# Patient Record
Sex: MALE | Race: WHITE | Employment: PART TIME | URBAN - METROPOLITAN AREA
[De-identification: names, ages, dates, MRNs, and addresses within clinical notes are randomized per-mention and may not be internally consistent; named-entity substitution may affect disease eponyms.]

---

## 2017-02-08 RX ORDER — QUINAPRIL 40 MG/1
TABLET ORAL
Qty: 30 TABLET | Refills: 5 | Status: SHIPPED | OUTPATIENT
Start: 2017-02-08 | End: 2017-08-08

## 2017-02-08 RX ORDER — HYDROCHLOROTHIAZIDE 25 MG/1
TABLET ORAL
Qty: 30 TABLET | Refills: 5 | Status: SHIPPED | OUTPATIENT
Start: 2017-02-08 | End: 2017-10-13

## 2017-02-08 RX ORDER — METOPROLOL TARTRATE 100 MG/1
TABLET ORAL
Qty: 60 TABLET | Refills: 5 | Status: SHIPPED | OUTPATIENT
Start: 2017-02-08 | End: 2017-08-28

## 2017-03-04 ENCOUNTER — APPOINTMENT (OUTPATIENT)
Dept: CT IMAGING | Facility: HOSPITAL | Age: 67
End: 2017-03-04
Attending: EMERGENCY MEDICINE
Payer: COMMERCIAL

## 2017-03-04 ENCOUNTER — TELEPHONE (OUTPATIENT)
Dept: INTERNAL MEDICINE CLINIC | Facility: CLINIC | Age: 67
End: 2017-03-04

## 2017-03-04 ENCOUNTER — HOSPITAL ENCOUNTER (EMERGENCY)
Facility: HOSPITAL | Age: 67
Discharge: HOME | End: 2017-03-04
Attending: EMERGENCY MEDICINE
Payer: COMMERCIAL

## 2017-03-04 VITALS
HEIGHT: 70 IN | TEMPERATURE: 97 F | HEART RATE: 66 BPM | RESPIRATION RATE: 18 BRPM | DIASTOLIC BLOOD PRESSURE: 79 MMHG | WEIGHT: 200 LBS | BODY MASS INDEX: 28.63 KG/M2 | OXYGEN SATURATION: 97 % | SYSTOLIC BLOOD PRESSURE: 129 MMHG

## 2017-03-04 DIAGNOSIS — S09.90XA HEAD INJURY, INITIAL ENCOUNTER: Primary | ICD-10-CM

## 2017-03-04 PROCEDURE — 99284 EMERGENCY DEPT VISIT MOD MDM: CPT

## 2017-03-04 PROCEDURE — 93010 ELECTROCARDIOGRAM REPORT: CPT | Performed by: EMERGENCY MEDICINE

## 2017-03-04 PROCEDURE — 70450 CT HEAD/BRAIN W/O DYE: CPT

## 2017-03-04 PROCEDURE — 93005 ELECTROCARDIOGRAM TRACING: CPT

## 2017-03-04 NOTE — TELEPHONE ENCOUNTER
Reason for Call/Chief Complaint: fall in apartment 2/25/17 tripped over bag hit head on wall now has H/A   Onset: 1 week ago  Nursing Assessment/Associated Symptoms: pt tripped over bag on floor, lost balance and hit head on wall while falling.   Denies LOC

## 2017-03-04 NOTE — TELEPHONE ENCOUNTER
Pt stts that he fell last Saturday 02/25 and hit his head. He is calling to schedule an appt with Dr. Marielle Ng due to having a headache.  Call transferred to RN

## 2017-03-05 NOTE — ED INITIAL ASSESSMENT (HPI)
Mechanical trip and fall one week ago; -LOC at time of injury. C/o increased frontal headache worse today. Hx of concussion 6 months ago.

## 2017-03-05 NOTE — ED NOTES
Pt states he fell last Saturday. Pt reports tripping over a gymbag and hitting his right forehead and the back of his head after trying to stop his fall by grabbing a chair. Denies LOC. Takes daily aspirin but has been off aspirin this past week.  Pt presxiao

## 2017-03-05 NOTE — ED PROVIDER NOTES
Patient Seen in: [facility] AND [facility] Emergency Department    History   Patient presents with:  Headache (neurologic)  Head Neck Injury (neurologic, musculoskeletal): pt hit head last saturday    Stated Complaint:     HPI    73-year-old male presents for co TABLET BY MOUTH TWO TIMES A DAY   QUINAPRIL HCL 40 MG Oral Tab,  TAKE ONE TABLET BY MOUTH EVERY DAY   HYDROCHLOROTHIAZIDE 25 MG Oral Tab,  TAKE ONE TABLET BY MOUTH EVERY DAY   AmLODIPine Besylate 10 MG Oral Tab,  Take 1 tablet (10 mg total) by mouth once d (36.2 °C) (Oral)  Resp 18  Ht 177.8 cm (5' 10\")  Wt 90.719 kg  BMI 28.70 kg/m2  SpO2 97%        Physical Exam   Constitutional: He is oriented to person, place, and time. He appears well-developed and well-nourished.    HENT:   Head: Normocephalic and atra foot: Normal.        Left foot: Normal.   Neurological: He is alert and oriented to person, place, and time. He has normal strength. No cranial nerve deficit or sensory deficit. Coordination and gait normal. GCS eye subscore is 4. GCS verbal subscore is 5. sinus. ORBITS: Limited views are unremarkable. OTHER: Negative. Dictated by (CST): Danna Garrido MD on 3/04/2017 at 19:50     Approved by (CST): Danna Arteaga MD on 3/04/2017 at 19:53          3/4/2017  CONCLUSION:  1.  No evidence o

## 2017-03-06 NOTE — TELEPHONE ENCOUNTER
Pt seen in ED 3/4/17, Dx with mild concussion. Treatment plan discussed with ED discharge physician.   Pt unable to schedule F/U appt at this time as leaving for out of town 3/7/17

## 2017-04-17 ENCOUNTER — OFFICE VISIT (OUTPATIENT)
Dept: INTERNAL MEDICINE CLINIC | Facility: CLINIC | Age: 67
End: 2017-04-17

## 2017-04-17 VITALS
HEIGHT: 68.5 IN | HEART RATE: 54 BPM | RESPIRATION RATE: 20 BRPM | SYSTOLIC BLOOD PRESSURE: 124 MMHG | WEIGHT: 198 LBS | TEMPERATURE: 98 F | BODY MASS INDEX: 29.66 KG/M2 | DIASTOLIC BLOOD PRESSURE: 66 MMHG

## 2017-04-17 DIAGNOSIS — N52.9 ERECTILE DYSFUNCTION, UNSPECIFIED ERECTILE DYSFUNCTION TYPE: ICD-10-CM

## 2017-04-17 DIAGNOSIS — Z00.00 ENCOUNTER FOR ANNUAL HEALTH EXAMINATION: Primary | ICD-10-CM

## 2017-04-17 DIAGNOSIS — I10 ESSENTIAL HYPERTENSION WITH GOAL BLOOD PRESSURE LESS THAN 140/90: ICD-10-CM

## 2017-04-17 DIAGNOSIS — C61 PROSTATE CANCER (HCC): ICD-10-CM

## 2017-04-17 DIAGNOSIS — I25.10 ATHEROSCLEROSIS OF NATIVE CORONARY ARTERY OF NATIVE HEART WITHOUT ANGINA PECTORIS: ICD-10-CM

## 2017-04-17 DIAGNOSIS — Z23 NEED FOR VACCINATION: ICD-10-CM

## 2017-04-17 DIAGNOSIS — E78.5 HYPERLIPIDEMIA, UNSPECIFIED HYPERLIPIDEMIA TYPE: ICD-10-CM

## 2017-04-17 DIAGNOSIS — M10.9 GOUT, UNSPECIFIED CAUSE, UNSPECIFIED CHRONICITY, UNSPECIFIED SITE: ICD-10-CM

## 2017-04-17 DIAGNOSIS — R73.09 ELEVATED GLUCOSE: ICD-10-CM

## 2017-04-17 PROCEDURE — G0438 PPPS, INITIAL VISIT: HCPCS | Performed by: INTERNAL MEDICINE

## 2017-04-17 PROCEDURE — 90736 HZV VACCINE LIVE SUBQ: CPT | Performed by: INTERNAL MEDICINE

## 2017-04-17 PROCEDURE — 90471 IMMUNIZATION ADMIN: CPT | Performed by: INTERNAL MEDICINE

## 2017-04-17 PROCEDURE — 90670 PCV13 VACCINE IM: CPT | Performed by: INTERNAL MEDICINE

## 2017-04-17 RX ORDER — AMLODIPINE BESYLATE 10 MG/1
10 TABLET ORAL
Qty: 90 TABLET | Refills: 3 | Status: SHIPPED | OUTPATIENT
Start: 2017-04-17 | End: 2018-04-19

## 2017-04-17 NOTE — PROGRESS NOTES
HPI:   Lexis Garcia is a 74year old male who presents for a Medicare Initial Annual Wellness visit (Once after 12 month Medicare anniversery) . Patient here for general physical exam.  He has Medicare as secondary insurance.   This will be done 2015     Treated with hormonal injection Trelstar x 1 in May 2015.  Then external beam radiation in Sept 2015; and brachytherapy in Dec 2015          Past Surgical History    KNEE SURGERY  2008    Comment plate and screws    ANGIOPLASTY (CORONARY)  2004 MOUTH ONCE DAILY   Pantoprazole Sodium 40 MG Oral Tab EC Take 40 mg by mouth every morning before breakfast.     tamsulosin HCl 0.4 MG Oral Cap    aspirin 81 MG Oral Tab Take 81 mg by mouth daily.    COMBIGAN 0.2-0.5 % Ophthalmic Solution 1 drop by Each eye (Required for AWV/SWV)    Hearing Screening    Screening Method:  Questionnaire      I have a problem hearing over the telephone:  No I have trouble following   the conversations when two or more people are talking at the same time:    No   I have trouble trachea midline, no adenopathy, thyroid: not enlarged, symmetric, no tenderness/mass/nodules, no carotid bruit or JVD   Back:   Symmetric, no curvature, ROM normal, no CVA tenderness   Lungs:   Clear to auscultation bilaterally, respirations unlabored   Ch ASSAY,         THYROID STIM HORMONE, VITAMIN B12        Blood pressure currently well controlled.   Continue current medications.    (I25.10) Atherosclerosis of native coronary artery of native heart without angina pectoris  Plan: No current cardiac symptom maintain a good energy level?: Appropriate Exercise    How would you describe your daily physical activity?: Moderate    How would you describe your current health state?: Good    How do you maintain positive mental well-being?: Social Interaction    If yo in Charting, test patient and document. Then, refresh your progress note to see your input here.   Cognitive Assessment     What day of the week is this?: Correct    What month is it?: Correct    What year is it?: Correct    Recall \"Ball\": Correct    R SPECIFIC DISEASE MONITORING Internal Lab or Procedure External Lab or Procedure   Annual Monitoring of Persistent     Medications (ACE/ARB, digoxin diuretics, anticonvulsants.)    Potassium  Annually POTASSIUM (P) (mmol/L)   Date Value   03/03/2016 4. 1

## 2017-05-10 ENCOUNTER — LAB ENCOUNTER (OUTPATIENT)
Dept: LAB | Age: 67
End: 2017-05-10
Attending: INTERNAL MEDICINE
Payer: COMMERCIAL

## 2017-05-10 DIAGNOSIS — Z00.00 ENCOUNTER FOR ANNUAL HEALTH EXAMINATION: ICD-10-CM

## 2017-05-10 DIAGNOSIS — I10 ESSENTIAL HYPERTENSION WITH GOAL BLOOD PRESSURE LESS THAN 140/90: ICD-10-CM

## 2017-05-10 DIAGNOSIS — C61 PROSTATE CANCER (HCC): ICD-10-CM

## 2017-05-10 DIAGNOSIS — R73.09 ELEVATED GLUCOSE: ICD-10-CM

## 2017-05-10 DIAGNOSIS — M10.9 GOUT, UNSPECIFIED CAUSE, UNSPECIFIED CHRONICITY, UNSPECIFIED SITE: ICD-10-CM

## 2017-05-10 PROCEDURE — 80053 COMPREHEN METABOLIC PANEL: CPT

## 2017-05-10 PROCEDURE — 82607 VITAMIN B-12: CPT

## 2017-05-10 PROCEDURE — 84153 ASSAY OF PSA TOTAL: CPT

## 2017-05-10 PROCEDURE — 85025 COMPLETE CBC W/AUTO DIFF WBC: CPT

## 2017-05-10 PROCEDURE — 84550 ASSAY OF BLOOD/URIC ACID: CPT

## 2017-05-10 PROCEDURE — 84443 ASSAY THYROID STIM HORMONE: CPT

## 2017-05-10 PROCEDURE — 80061 LIPID PANEL: CPT

## 2017-05-10 PROCEDURE — 36415 COLL VENOUS BLD VENIPUNCTURE: CPT

## 2017-05-10 PROCEDURE — 83036 HEMOGLOBIN GLYCOSYLATED A1C: CPT

## 2017-07-14 ENCOUNTER — APPOINTMENT (OUTPATIENT)
Dept: OTHER | Facility: HOSPITAL | Age: 67
End: 2017-07-14
Attending: ORTHOPAEDIC SURGERY

## 2017-08-11 RX ORDER — QUINAPRIL 40 MG/1
TABLET ORAL
Qty: 30 TABLET | Refills: 2 | Status: SHIPPED | OUTPATIENT
Start: 2017-08-11 | End: 2017-11-02

## 2017-08-11 NOTE — TELEPHONE ENCOUNTER
Hypertensive Medications  Protocol Criteria:  · Appointment scheduled in the past 6 months or in the next 3 months  · BMP or CMP in the past 12 months  · Creatinine result < 2  Recent Outpatient Visits            3 months ago Encounter for annual health ex

## 2017-08-30 RX ORDER — METOPROLOL TARTRATE 100 MG/1
TABLET ORAL
Qty: 60 TABLET | Refills: 2 | Status: SHIPPED | OUTPATIENT
Start: 2017-08-30 | End: 2017-12-16

## 2017-08-30 NOTE — TELEPHONE ENCOUNTER
Hypertensive Medications:Refilled per protocol    Protocol Criteria:  · Appointment scheduled in the past 6 months or in the next 3 months  · BMP or CMP in the past 12 months  · Creatinine result < 2  Recent Outpatient Visits            4 months ago [employer]

## 2017-10-02 ENCOUNTER — TELEPHONE (OUTPATIENT)
Dept: INTERNAL MEDICINE CLINIC | Facility: CLINIC | Age: 67
End: 2017-10-02

## 2017-10-02 RX ORDER — ATORVASTATIN CALCIUM 40 MG/1
TABLET, FILM COATED ORAL
Qty: 30 TABLET | Refills: 0 | Status: SHIPPED | OUTPATIENT
Start: 2017-10-02 | End: 2017-11-02

## 2017-10-02 NOTE — TELEPHONE ENCOUNTER
Per pt, he needs refill on his ATORVASTATIN CALCIUM .       Current Outpatient Prescriptions:                          ATORVASTATIN CALCIUM 40 MG Oral Tab TAKE ONE TABLET BY MOUTH ONCE DAILY Disp: 30 tablet Rfl: 4

## 2017-10-04 RX ORDER — ATORVASTATIN CALCIUM 40 MG/1
TABLET, FILM COATED ORAL
Qty: 30 TABLET | Refills: 2 | OUTPATIENT
Start: 2017-10-04

## 2017-10-13 ENCOUNTER — PATIENT OUTREACH (OUTPATIENT)
Dept: INTERNAL MEDICINE CLINIC | Facility: CLINIC | Age: 67
End: 2017-10-13

## 2017-10-16 RX ORDER — HYDROCHLOROTHIAZIDE 25 MG/1
TABLET ORAL
Qty: 90 TABLET | Refills: 0 | Status: SHIPPED | OUTPATIENT
Start: 2017-10-16 | End: 2018-03-03

## 2017-10-16 NOTE — TELEPHONE ENCOUNTER
Hypertensive Medications; REFILLED PER PROTOCOL.       Protocol Criteria:  · Appointment scheduled in the past 6 months or in the next 3 months  · BMP or CMP in the past 12 months  · Creatinine result < 2  Recent Outpatient Visits            6 months ago En

## 2017-10-17 ENCOUNTER — PATIENT MESSAGE (OUTPATIENT)
Dept: INTERNAL MEDICINE CLINIC | Facility: CLINIC | Age: 67
End: 2017-10-17

## 2017-10-18 NOTE — TELEPHONE ENCOUNTER
From: Lexis Garcia  To: Ishmael Gunn MD  Sent: 10/17/2017 2:30 PM CDT  Subject: Visit Follow-up Question    I had to cancel my October appointment due to my revised work schedule.  I have rescheduled for December 14th but if a time opens up on a T

## 2017-11-02 NOTE — TELEPHONE ENCOUNTER
Please advise on refill request.    Hypertensive Medications  Protocol Criteria:  · Appointment scheduled in the past 6 months or in the next 3 months  · BMP or CMP in the past 12 months  · Creatinine result < 2  Recent Outpatient Visits            6 month Daniel Obrien Sofia Epps, MD    Office Visit    1 year ago Concussion, without loss of consciousness, initial encounter    Daniel Obrien Sofia Epps, MD    Office Visit    1 year ago Concussion, withou

## 2017-11-03 RX ORDER — ATORVASTATIN CALCIUM 40 MG/1
TABLET, FILM COATED ORAL
Qty: 90 TABLET | Refills: 1 | Status: SHIPPED | OUTPATIENT
Start: 2017-11-03 | End: 2018-06-19

## 2017-11-03 RX ORDER — QUINAPRIL 40 MG/1
TABLET ORAL
Qty: 90 TABLET | Refills: 1 | Status: SHIPPED | OUTPATIENT
Start: 2017-11-03 | End: 2018-05-03

## 2017-12-14 ENCOUNTER — OFFICE VISIT (OUTPATIENT)
Dept: INTERNAL MEDICINE CLINIC | Facility: CLINIC | Age: 67
End: 2017-12-14

## 2017-12-14 VITALS
HEART RATE: 61 BPM | BODY MASS INDEX: 32.09 KG/M2 | HEIGHT: 68.5 IN | SYSTOLIC BLOOD PRESSURE: 120 MMHG | RESPIRATION RATE: 18 BRPM | WEIGHT: 214.19 LBS | DIASTOLIC BLOOD PRESSURE: 75 MMHG

## 2017-12-14 DIAGNOSIS — I25.10 ATHEROSCLEROSIS OF NATIVE CORONARY ARTERY OF NATIVE HEART WITHOUT ANGINA PECTORIS: ICD-10-CM

## 2017-12-14 DIAGNOSIS — Z85.46 HISTORY OF PROSTATE CANCER: ICD-10-CM

## 2017-12-14 DIAGNOSIS — N52.9 ERECTILE DYSFUNCTION, UNSPECIFIED ERECTILE DYSFUNCTION TYPE: ICD-10-CM

## 2017-12-14 DIAGNOSIS — I10 ESSENTIAL HYPERTENSION WITH GOAL BLOOD PRESSURE LESS THAN 140/90: Primary | ICD-10-CM

## 2017-12-14 DIAGNOSIS — E78.5 HYPERLIPIDEMIA, UNSPECIFIED HYPERLIPIDEMIA TYPE: ICD-10-CM

## 2017-12-14 DIAGNOSIS — M10.9 GOUT, UNSPECIFIED CAUSE, UNSPECIFIED CHRONICITY, UNSPECIFIED SITE: ICD-10-CM

## 2017-12-14 PROCEDURE — 99214 OFFICE O/P EST MOD 30 MIN: CPT | Performed by: INTERNAL MEDICINE

## 2017-12-14 PROCEDURE — G0463 HOSPITAL OUTPT CLINIC VISIT: HCPCS | Performed by: INTERNAL MEDICINE

## 2017-12-14 NOTE — PROGRESS NOTES
HPI:    Patient ID: Mikie Rod is a 69year old male.     HPI  Patient returns to the office today to discuss chronic medical issues which include Patient Active Problem List:     Coronary atherosclerosis     Gout     Essential hypertension     Hype knees      Past Surgical History:  2004: ANGIOPLASTY (CORONARY)      Comment: and stent of LCx  2011: CARD ECHO STRESS ECHO/REST AND STRESS(CPT=9335*      Comment: per NG: normal stress echo, (CAD)  No date: HEAD SURGERY PROC UNLISTED      Comment: per NG: Take 1 tablet (10 mg total) by mouth once daily. Disp: 90 tablet Rfl: 3   aspirin 81 MG Oral Tab Take 81 mg by mouth daily. Disp:  Rfl:    COMBIGAN 0.2-0.5 % Ophthalmic Solution 1 drop by Each eye route 2 (two) times daily.    Disp:  Rfl:      Allergies:No prostate cancer. Discussed various options. I would consider high-dose Viagra or Cialis. Patient will think about it.   He may either call us or talk to his urology doctors.    (Z85.46) History of prostate cancer  Plan: No evidence of recurrence at this

## 2017-12-16 RX ORDER — METOPROLOL TARTRATE 100 MG/1
TABLET ORAL
Qty: 180 TABLET | Refills: 0 | Status: SHIPPED | OUTPATIENT
Start: 2017-12-16 | End: 2018-03-30

## 2017-12-29 ENCOUNTER — HOSPITAL ENCOUNTER (OUTPATIENT)
Dept: GENERAL RADIOLOGY | Facility: HOSPITAL | Age: 67
Discharge: HOME | End: 2017-12-29
Attending: ORTHOPAEDIC SURGERY | Admitting: ORTHOPAEDIC SURGERY
Payer: MEDICARE

## 2017-12-29 ENCOUNTER — OFFICE VISIT (OUTPATIENT)
Dept: ORTHOPEDICS CLINIC | Facility: CLINIC | Age: 67
End: 2017-12-29

## 2017-12-29 ENCOUNTER — HOSPITAL ENCOUNTER (OUTPATIENT)
Dept: ULTRASOUND IMAGING | Facility: HOSPITAL | Age: 67
Discharge: HOME | End: 2017-12-29
Attending: ORTHOPAEDIC SURGERY
Payer: MEDICARE

## 2017-12-29 DIAGNOSIS — M79.661 RIGHT CALF PAIN: ICD-10-CM

## 2017-12-29 DIAGNOSIS — M17.11 PRIMARY OSTEOARTHRITIS OF RIGHT KNEE: Primary | ICD-10-CM

## 2017-12-29 DIAGNOSIS — M25.561 ACUTE PAIN OF RIGHT KNEE: ICD-10-CM

## 2017-12-29 PROCEDURE — 99203 OFFICE O/P NEW LOW 30 MIN: CPT | Performed by: ORTHOPAEDIC SURGERY

## 2017-12-29 PROCEDURE — G0463 HOSPITAL OUTPT CLINIC VISIT: HCPCS | Performed by: ORTHOPAEDIC SURGERY

## 2017-12-29 PROCEDURE — 73565 X-RAY EXAM OF KNEES: CPT | Performed by: ORTHOPAEDIC SURGERY

## 2017-12-29 PROCEDURE — 93971 EXTREMITY STUDY: CPT | Performed by: ORTHOPAEDIC SURGERY

## 2017-12-29 PROCEDURE — 73560 X-RAY EXAM OF KNEE 1 OR 2: CPT | Performed by: ORTHOPAEDIC SURGERY

## 2017-12-29 NOTE — PROGRESS NOTES
12/29/2017  Juan Diego Aguilar  9/21/1950  69year old   male  Jerson Knox MD    HPI:   Patient presents with:  Knee Pain: Right - onset 12/23/17 when he twiked his knee and he was unable to put any weight on it - states he is a little better - [facility] [place]) Oct 2014   • CAD (coronary artery disease)     x2; chemical stress test normal, done in [facility] Oct 2014   • Prostate cancer [facility]) May 2015    Treated with hormonal injection Trelstar x 1 in May 2015.  Then external beam radiation in Sept 2015; a from 5-100°. The patient has a soft calf and negative Homans sign. The patient has a varus malalignment of the right knee.     IMAGING AND TESTING:  Plain films of the right knee were obtained today reveal significant medial, lateral, patellofemoral lupe

## 2018-01-02 ENCOUNTER — OFFICE VISIT (OUTPATIENT)
Dept: ORTHOPEDICS CLINIC | Facility: CLINIC | Age: 68
End: 2018-01-02

## 2018-01-02 VITALS — RESPIRATION RATE: 16 BRPM | SYSTOLIC BLOOD PRESSURE: 148 MMHG | HEART RATE: 72 BPM | DIASTOLIC BLOOD PRESSURE: 78 MMHG

## 2018-01-02 DIAGNOSIS — M17.11 PRIMARY OSTEOARTHRITIS OF RIGHT KNEE: Primary | ICD-10-CM

## 2018-01-02 PROCEDURE — 99213 OFFICE O/P EST LOW 20 MIN: CPT | Performed by: ORTHOPAEDIC SURGERY

## 2018-01-02 PROCEDURE — G0463 HOSPITAL OUTPT CLINIC VISIT: HCPCS | Performed by: ORTHOPAEDIC SURGERY

## 2018-01-03 NOTE — PROGRESS NOTES
1/2/2018  Nicolettezaida Penndenis  9/21/1950  69year old   male  Rashmi Ramirez MD    HPI:   Patient presents with:  Knee Pain: Right- pt states there has been some improvement since LOV on 12/29, but pt would like a cortisone injection    This is a pleas increases, he would be a candidate for cortisone injection. All of their questions were answered and they are in agreement with the treatment plan.

## 2018-01-10 ENCOUNTER — OFFICE VISIT (OUTPATIENT)
Dept: PHYSICAL THERAPY | Facility: HOSPITAL | Age: 68
End: 2018-01-10
Attending: ORTHOPAEDIC SURGERY
Payer: MEDICARE

## 2018-01-10 PROCEDURE — 97110 THERAPEUTIC EXERCISES: CPT

## 2018-01-10 PROCEDURE — 97162 PT EVAL MOD COMPLEX 30 MIN: CPT

## 2018-01-10 NOTE — PROGRESS NOTES
LOWER EXTREMITY EVALUATION:   Referring Physician: Dr. Tavon Rivers  Diagnosis: Primary osteoarthritis of right knee (M17.11)      Date of Onset: 12/20/17 Date of Service: 1/10/2018     PATIENT SUMMARY   The patient is a 70 y/o male who presented with chronic anatomical position  Gait: antalgic pattern with R lateral lean; R hip ER with mild valgus   Palpation: TTP R lateral joint line   Sensation: WNL    AROM: Knee:  R 0-115 deg; L 0- 117 deg     Accessory motion: moderate loss (2/6) sup/inf R patellar mobilit Walking and Moving Around CJ: 20-39% impaired, limited, or restricted    The patient was advised of these findings, precautions, and treatment options and has agreed to actively participate in planning and for this course of care.     Thank you for your ref

## 2018-01-17 ENCOUNTER — OFFICE VISIT (OUTPATIENT)
Dept: PHYSICAL THERAPY | Facility: HOSPITAL | Age: 68
End: 2018-01-17
Attending: ORTHOPAEDIC SURGERY
Payer: MEDICARE

## 2018-01-17 PROCEDURE — 97110 THERAPEUTIC EXERCISES: CPT

## 2018-01-17 NOTE — PROGRESS NOTES
Diagnosis: Primary osteoarthritis of right knee (M17.11)   Authorized # of Visits:  2/10 (Medicare)        Next MD visit: none scheduled  Fall Risk: standard         Precautions: n/a           Medication Changes since last visit?: No  Subjective: PAS 2/10. min

## 2018-01-19 ENCOUNTER — OFFICE VISIT (OUTPATIENT)
Dept: PHYSICAL THERAPY | Facility: HOSPITAL | Age: 68
End: 2018-01-19
Attending: ORTHOPAEDIC SURGERY
Payer: MEDICARE

## 2018-01-19 PROCEDURE — 97110 THERAPEUTIC EXERCISES: CPT

## 2018-01-19 NOTE — PROGRESS NOTES
Diagnosis: Primary osteoarthritis of right knee (M17.11)   Authorized # of Visits:  3/10 (Medicare)        Next MD visit: none scheduled  Fall Risk: standard         Precautions: n/a           Medication Changes since last visit?: No  Subjective:    PAS 0- body mechanics      Skilled Services: PREs, HEP progression     Charges: there ex 3       Total Timed Treatment: 40 min  Total Treatment Time:  42 min

## 2018-01-24 ENCOUNTER — OFFICE VISIT (OUTPATIENT)
Dept: PHYSICAL THERAPY | Facility: HOSPITAL | Age: 68
End: 2018-01-24
Attending: ORTHOPAEDIC SURGERY
Payer: MEDICARE

## 2018-01-24 PROCEDURE — 97110 THERAPEUTIC EXERCISES: CPT

## 2018-01-24 NOTE — PROGRESS NOTES
Diagnosis: Primary osteoarthritis of right knee (M17.11)   Authorized # of Visits:  4/10 (Medicare)        Next MD visit: none scheduled  Fall Risk: standard         Precautions: n/a           Medication Changes since last visit?: No  Subjective:    Overal Charges: there ex 3       Total Timed Treatment: 40 min  Total Treatment Time:  42 min

## 2018-01-26 ENCOUNTER — OFFICE VISIT (OUTPATIENT)
Dept: PHYSICAL THERAPY | Facility: HOSPITAL | Age: 68
End: 2018-01-26
Attending: ORTHOPAEDIC SURGERY
Payer: MEDICARE

## 2018-01-26 PROCEDURE — 97110 THERAPEUTIC EXERCISES: CPT

## 2018-01-26 NOTE — PROGRESS NOTES
Diagnosis: Primary osteoarthritis of right knee (M17.11)   Authorized # of Visits:  5/10 (Medicare)        Next MD visit: none scheduled  Fall Risk: standard         Precautions: n/a           Medication Changes since last visit?: No  Subjective:    Overal Skilled Services: PREs, HEP progression     Charges: there ex 3       Total Timed Treatment: 40 min  Total Treatment Time:  42 min

## 2018-01-31 ENCOUNTER — TELEPHONE (OUTPATIENT)
Dept: INTERNAL MEDICINE CLINIC | Facility: CLINIC | Age: 68
End: 2018-01-31

## 2018-01-31 ENCOUNTER — OFFICE VISIT (OUTPATIENT)
Dept: PHYSICAL THERAPY | Facility: HOSPITAL | Age: 68
End: 2018-01-31
Attending: ORTHOPAEDIC SURGERY
Payer: MEDICARE

## 2018-01-31 PROCEDURE — 97110 THERAPEUTIC EXERCISES: CPT

## 2018-01-31 NOTE — TELEPHONE ENCOUNTER
Please contact the patient. There are a couple of options. We can try Flomax/tamsulosin. The chart shows that he was on this at some point in the past.  Was it effective? Was it well tolerated? The other option would be Avodart/Dutasteride.   Please le

## 2018-01-31 NOTE — TELEPHONE ENCOUNTER
Pt states at his last OV with Dr Dipika Abreu on 12/14/17, they discussed his poor sleep due to frequent nighttime urination and questioning if it was the need to urinate that was waking him up or if he was simply waking up and then urinating.  Pt states current

## 2018-01-31 NOTE — PROGRESS NOTES
Diagnosis: Primary osteoarthritis of right knee (M17.11)   Authorized # of Visits:  6/10 (Medicare)        Next MD visit: none scheduled  Fall Risk: standard         Precautions: n/a           Medication Changes since last visit?: No  Subjective:    Overal limitation      Plan: reduce R knee pain; improve R knee ROM/mobility, quad/hamstring strength, single leg stability, gait and body mechanics      Skilled Services: PREs, HEP progression     Charges: there ex 3       Total Timed Treatment: 40 min  Total Tr

## 2018-02-01 RX ORDER — DUTASTERIDE 0.5 MG/1
0.5 CAPSULE, LIQUID FILLED ORAL DAILY
Qty: 30 CAPSULE | Refills: 5 | Status: SHIPPED | OUTPATIENT
Start: 2018-02-01 | End: 2019-06-04

## 2018-02-01 NOTE — TELEPHONE ENCOUNTER
Pt stts was on Flomax in the past but didn't notice a difference after a couple of weeks. . Would like to try Avodart please if it doesn't interfere with all his other meds.

## 2018-02-01 NOTE — TELEPHONE ENCOUNTER
Please call patient. Prescription for Avodart sent to the pharmacy. He can start that and see if it helps over the coming weeks. It should not interact with his other medications.

## 2018-02-02 ENCOUNTER — TELEPHONE (OUTPATIENT)
Dept: ORTHOPEDICS CLINIC | Facility: CLINIC | Age: 68
End: 2018-02-02

## 2018-02-02 ENCOUNTER — OFFICE VISIT (OUTPATIENT)
Dept: PHYSICAL THERAPY | Facility: HOSPITAL | Age: 68
End: 2018-02-02
Attending: ORTHOPAEDIC SURGERY
Payer: MEDICARE

## 2018-02-02 DIAGNOSIS — M17.11 PRIMARY OSTEOARTHRITIS OF RIGHT KNEE: Primary | ICD-10-CM

## 2018-02-02 PROCEDURE — 97110 THERAPEUTIC EXERCISES: CPT

## 2018-02-02 NOTE — PROGRESS NOTES
Diagnosis: Primary osteoarthritis of right knee (M17.11)   Authorized # of Visits:  7/10 (Medicare)        Next MD visit: none scheduled  Fall Risk: standard         Precautions: n/a           Medication Changes since last visit?: No  Subjective:    Pt rep demonstrate improvement in gait mechanics (i.e decrease lateral lean) necessary to walk hi dogs > 20 minutes without limitation      Plan: reduce R knee pain; improve R knee ROM/mobility, quad/hamstring strength, single leg stability, gait and body mechani

## 2018-02-02 NOTE — TELEPHONE ENCOUNTER
pt called, he is requesting  4 more PT visits. Please advise. Next appt is 2/14/18 with PT. OK to leave message on VM.

## 2018-02-04 NOTE — TELEPHONE ENCOUNTER
Left message on personal VM stating rx as requested on 2/1/18 has been switched and approved by [facility] to local pharm. To take as prescribed, see if it helps over the next few weeks and no interaction with other rx's. To call back if questions/concerns.

## 2018-02-05 NOTE — TELEPHONE ENCOUNTER
Pt called back and stated that he has started the medication but is concern as the pharmacist inform him it might lower his b/p. Pt was inform that [facility] is aware of the medication he is taking.  Pt was advised to monitor his b/p if he feels its gettin

## 2018-02-07 ENCOUNTER — OFFICE VISIT (OUTPATIENT)
Dept: PHYSICAL THERAPY | Facility: HOSPITAL | Age: 68
End: 2018-02-07
Attending: ORTHOPAEDIC SURGERY
Payer: MEDICARE

## 2018-02-07 PROCEDURE — 97110 THERAPEUTIC EXERCISES: CPT

## 2018-02-07 NOTE — PROGRESS NOTES
Diagnosis: Primary osteoarthritis of right knee (M17.11)   Authorized # of Visits:  8/10 (Medicare)        Next MD visit: none scheduled  Fall Risk: standard         Precautions: n/a           Medication Changes since last visit?: No  Subjective:    Pt rep patient will demonstrate improvement in gait mechanics (i.e decrease lateral lean) necessary to walk hi dogs > 20 minutes without limitation      Plan: reduce R knee pain; improve R knee ROM/mobility, quad/hamstring strength, single leg stability, gait and

## 2018-02-21 ENCOUNTER — APPOINTMENT (OUTPATIENT)
Dept: PHYSICAL THERAPY | Facility: HOSPITAL | Age: 68
End: 2018-02-21
Attending: ORTHOPAEDIC SURGERY
Payer: MEDICARE

## 2018-02-28 ENCOUNTER — OFFICE VISIT (OUTPATIENT)
Dept: PHYSICAL THERAPY | Facility: HOSPITAL | Age: 68
End: 2018-02-28
Attending: ORTHOPAEDIC SURGERY
Payer: MEDICARE

## 2018-02-28 PROCEDURE — 97110 THERAPEUTIC EXERCISES: CPT

## 2018-02-28 NOTE — PROGRESS NOTES
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

## 2018-03-02 ENCOUNTER — OFFICE VISIT (OUTPATIENT)
Dept: PHYSICAL THERAPY | Facility: HOSPITAL | Age: 68
End: 2018-03-02
Attending: ORTHOPAEDIC SURGERY
Payer: MEDICARE

## 2018-03-02 PROCEDURE — 97110 THERAPEUTIC EXERCISES: CPT

## 2018-03-02 NOTE — PROGRESS NOTES
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

## 2018-03-05 RX ORDER — HYDROCHLOROTHIAZIDE 25 MG/1
TABLET ORAL
Qty: 90 TABLET | Refills: 1 | Status: SHIPPED | OUTPATIENT
Start: 2018-03-05 | End: 2018-10-01

## 2018-03-07 ENCOUNTER — OFFICE VISIT (OUTPATIENT)
Dept: PHYSICAL THERAPY | Facility: HOSPITAL | Age: 68
End: 2018-03-07
Attending: ORTHOPAEDIC SURGERY
Payer: MEDICARE

## 2018-03-07 PROCEDURE — 97110 THERAPEUTIC EXERCISES: CPT

## 2018-03-07 NOTE — PROGRESS NOTES
Diagnosis: Primary osteoarthritis of right knee (M17.11)   Authorized # of Visits: 11 (Medicare)        Next MD visit: none scheduled  Fall Risk: standard         Precautions: n/a           Medication Changes since last visit?: No  Subjective:    Pt report will demonstrate 2/3 to 1 grade increase in R knee extension strength necessary to descend stairs without UE support with normal pattern PAS <2/10    3) The patient will demonstrate 5-8 deg increase in bilateral flexion ROM necessary to kneel at work while

## 2018-03-14 ENCOUNTER — OFFICE VISIT (OUTPATIENT)
Dept: PHYSICAL THERAPY | Facility: HOSPITAL | Age: 68
End: 2018-03-14
Attending: ORTHOPAEDIC SURGERY
Payer: MEDICARE

## 2018-03-14 PROCEDURE — 97110 THERAPEUTIC EXERCISES: CPT

## 2018-03-14 NOTE — PROGRESS NOTES
Diagnosis: Primary osteoarthritis of right knee (M17.11)   Authorized # of Visits: 12 (Medicare)        Next MD visit: none scheduled  Fall Risk: standard         Precautions: n/a           Medication Changes since last visit?: No  Subjective:    Pt report Supine R knee ext with therapist OP 10 x 5 cts     Prone R quad stretch with belt 5 x 20 cts (HEP)    Gastroc stretch off edge of stair 4 x 20 cts  Lunge 2nd stair 10 x 3 cts   Side step GTB 4 x 30 ft  Standing hip abd/flex GTB 10x  [employer]omat balance beam

## 2018-03-31 RX ORDER — METOPROLOL TARTRATE 100 MG/1
TABLET ORAL
Qty: 180 TABLET | Refills: 0 | Status: SHIPPED | OUTPATIENT
Start: 2018-03-31 | End: 2018-06-30

## 2018-03-31 NOTE — TELEPHONE ENCOUNTER
Hypertensive Medications  Protocol Criteria:  · Appointment scheduled in the past 6 months or in the next 3 months  · BMP or CMP in the past 12 months  · Creatinine result < 2  Recent Outpatient Visits            2 weeks ago     [address]

## 2018-04-04 ENCOUNTER — TELEPHONE (OUTPATIENT)
Dept: ORTHOPEDICS CLINIC | Facility: CLINIC | Age: 68
End: 2018-04-04

## 2018-04-04 NOTE — TELEPHONE ENCOUNTER
Called back Mr Sarthak Jenkins.  Notiifed of needing to see rheumatologist  Notified of the appointment for April 13    Verbalizes understanding

## 2018-04-04 NOTE — TELEPHONE ENCOUNTER
Pain in the right knee. Knee does not support weight , He needs a cane. . Very sore if sits for a long time. Will lay down and ice. Gout in knees in the past.   Had a steroid in the past. Oral steroid. Suggested he make an appointment.  Wants to get in so

## 2018-04-04 NOTE — TELEPHONE ENCOUNTER
Pt states he was told by [facility] he has exasperated knee, pt states there was improvement after PT, but states his problem came back, asking to get steroid prescribed. Pls advise thank you.

## 2018-04-21 RX ORDER — AMLODIPINE BESYLATE 10 MG/1
TABLET ORAL
Qty: 30 TABLET | Refills: 2 | Status: SHIPPED | OUTPATIENT
Start: 2018-04-21 | End: 2018-07-13

## 2018-04-21 NOTE — TELEPHONE ENCOUNTER
rx refilled as per written protocol.     Hypertensive Medications  Protocol Criteria:  · Appointment scheduled in the past 6 months or in the next 3 months  · BMP or CMP in the past 12 months  · Creatinine result < 2  Recent Outpatient Visits            1 m

## 2018-04-26 ENCOUNTER — OFFICE VISIT (OUTPATIENT)
Dept: INTERNAL MEDICINE CLINIC | Facility: CLINIC | Age: 68
End: 2018-04-26

## 2018-04-26 VITALS
HEIGHT: 68 IN | RESPIRATION RATE: 20 BRPM | WEIGHT: 213.63 LBS | DIASTOLIC BLOOD PRESSURE: 74 MMHG | BODY MASS INDEX: 32.38 KG/M2 | SYSTOLIC BLOOD PRESSURE: 132 MMHG | TEMPERATURE: 99 F | HEART RATE: 74 BPM

## 2018-04-26 DIAGNOSIS — Z23 NEED FOR VACCINATION: ICD-10-CM

## 2018-04-26 DIAGNOSIS — M10.9 GOUT, UNSPECIFIED CAUSE, UNSPECIFIED CHRONICITY, UNSPECIFIED SITE: ICD-10-CM

## 2018-04-26 DIAGNOSIS — Z00.00 ENCOUNTER FOR ANNUAL HEALTH EXAMINATION: Primary | ICD-10-CM

## 2018-04-26 DIAGNOSIS — I25.10 ATHEROSCLEROSIS OF NATIVE CORONARY ARTERY OF NATIVE HEART WITHOUT ANGINA PECTORIS: ICD-10-CM

## 2018-04-26 DIAGNOSIS — I10 ESSENTIAL HYPERTENSION WITH GOAL BLOOD PRESSURE LESS THAN 140/90: ICD-10-CM

## 2018-04-26 DIAGNOSIS — Z85.46 HISTORY OF PROSTATE CANCER: ICD-10-CM

## 2018-04-26 DIAGNOSIS — E78.5 HYPERLIPIDEMIA, UNSPECIFIED HYPERLIPIDEMIA TYPE: ICD-10-CM

## 2018-04-26 DIAGNOSIS — R73.09 ELEVATED GLUCOSE: ICD-10-CM

## 2018-04-26 PROCEDURE — 90732 PPSV23 VACC 2 YRS+ SUBQ/IM: CPT | Performed by: INTERNAL MEDICINE

## 2018-04-26 PROCEDURE — G0009 ADMIN PNEUMOCOCCAL VACCINE: HCPCS | Performed by: INTERNAL MEDICINE

## 2018-04-26 PROCEDURE — G0439 PPPS, SUBSEQ VISIT: HCPCS | Performed by: INTERNAL MEDICINE

## 2018-04-26 RX ORDER — HYDROCHLOROTHIAZIDE 25 MG/1
25 TABLET ORAL
COMMUNITY
End: 2018-04-26

## 2018-04-26 RX ORDER — VITAMIN B COMPLEX
TABLET ORAL
COMMUNITY
End: 2019-06-04

## 2018-04-26 RX ORDER — FAMOTIDINE 20 MG
1000 TABLET ORAL WEEKLY
Status: ON HOLD | COMMUNITY
End: 2021-01-01

## 2018-04-26 RX ORDER — ASPIRIN 81 MG/1
81 TABLET ORAL
COMMUNITY
End: 2018-04-26

## 2018-04-26 RX ORDER — ATORVASTATIN CALCIUM 40 MG/1
40 TABLET, FILM COATED ORAL
COMMUNITY
End: 2018-04-26

## 2018-04-26 RX ORDER — AMLODIPINE BESYLATE 10 MG/1
10 TABLET ORAL
COMMUNITY
End: 2018-04-26

## 2018-04-26 RX ORDER — METOPROLOL TARTRATE 100 MG/1
100 TABLET ORAL
COMMUNITY
End: 2018-04-26

## 2018-04-26 RX ORDER — QUINAPRIL 40 MG/1
40 TABLET ORAL
COMMUNITY
End: 2018-04-26

## 2018-04-26 NOTE — PROGRESS NOTES
HPI:   Ac Cohen is a 69year old male who presents for a Medicare Subsequent Annual Wellness visit (Pt already had Initial Annual Wellness). Patient is here for annual wellness follow-up on chronic medical issues as listed below.   Last seen patient and Family/surrogate (if present), and forms available to patient in AVS       He does NOT have a Power of  for [employer] on file in [address] [place] [address] Rd.    Advance care planning including the explanation and discussion of advance directives standard for Oral Tab Take by mouth.    AMLODIPINE BESYLATE 10 MG Oral Tab TAKE ONE TABLET BY MOUTH ONE TIME DAILY    METOPROLOL TARTRATE 100 MG Oral Tab TAKE 1 TABLET BY MOUTH TWO TIMES A DAY   HYDROCHLOROTHIAZIDE 25 MG Oral Tab TAKE ONE TABLET BY MOUTH ONE TIME DAILY No   I have trouble understanding things on the TV:  No I have to strain to understand conversations:  No   I have to worry about missing the telephone ring or doorbell:  No I have trouble hearing conversations in a noisy background such as a crowded room There is no tenderness. Hernia confirmed negative in the right inguinal area and confirmed negative in the left inguinal area. Genitourinary: Rectum normal, prostate normal, testes normal and penis normal. Circumcised.    Musculoskeletal: He exhibits no t Blood pressure is reasonably well controlled. Continue same medications.   Check fasting blood work in about a month or so.    (E78.5) Hyperlipidemia, unspecified hyperlipidemia type  Plan: Check lipid profile and adjust medication if needed.    (M10.9) Go (mg/dL)   Date Value   05/10/2017 114 (H)   ----------  GLUCOSE (P) (mg/dL)   Date Value   03/03/2016 143 (H)   ----------       Cardiovascular Disease Screening     LDL Annually LDL Cholesterol (mg/dL)   Date Value   05/10/2017 59   03/03/2016 72        E found This may be covered with your pharmacy  prescription benefits      SPECIFIC DISEASE MONITORING Internal Lab or Procedure External Lab or Procedure      Annual Monitoring of Persistent     Medications (ACE/ARB, digoxin diuretics, anticonvulsants.)

## 2018-04-26 NOTE — PATIENT INSTRUCTIONS
London Watson's SCREENING SCHEDULE   Tests on this list are recommended by your physician but may not be covered, or covered at this frequency, by your insurer. Please check with your insurance carrier before scheduling to verify coverage.     PREVEN criteria:   • Men who are 73-68 years old and have smoked more than 100 cigarettes in their lifetime   • Anyone with a family history    Colorectal Cancer Screening Covered up to Age 76     Colonoscopy Screen   Covered every 10 years- more often if abnorma Homosexual men   Illicit injectable drug abusers     Tetanus Toxoid- Only covered with a cut with metal- TD and TDaP Not covered by Medicare Part B) No orders found for this or any previous visit.  This may be covered with your prescription benefits, but

## 2018-05-03 DIAGNOSIS — I10 HYPERTENSION, UNSPECIFIED TYPE: Primary | ICD-10-CM

## 2018-05-03 RX ORDER — QUINAPRIL 40 MG/1
TABLET ORAL
Qty: 90 TABLET | Refills: 0 | Status: SHIPPED | OUTPATIENT
Start: 2018-05-03 | End: 2018-07-31

## 2018-05-03 NOTE — TELEPHONE ENCOUNTER
Hypertensive Medications  Protocol Criteria:  · Appointment scheduled in the past 6 months or in the next 3 months  · BMP or CMP in the past 12 months  · Creatinine result < 2  Recent Outpatient Visits            1 week ago Encounter for annual health exam

## 2018-05-03 NOTE — TELEPHONE ENCOUNTER
Pending Prescriptions Disp Refills    QUINAPRIL HCL 40 MG Oral Tab [Pharmacy Med Name: Quinapril HCl Oral Tablet 40 MG] 90 tablet 0     Sig: TAKE ONE TABLET BY MOUTH ONE TIME DAILY            Refill approved per protocol.

## 2018-06-05 ENCOUNTER — LAB ENCOUNTER (OUTPATIENT)
Dept: LAB | Facility: HOSPITAL | Age: 68
End: 2018-06-05
Attending: INTERNAL MEDICINE
Payer: MEDICARE

## 2018-06-05 DIAGNOSIS — I10 ESSENTIAL HYPERTENSION WITH GOAL BLOOD PRESSURE LESS THAN 140/90: ICD-10-CM

## 2018-06-05 DIAGNOSIS — M10.9 GOUT, UNSPECIFIED CAUSE, UNSPECIFIED CHRONICITY, UNSPECIFIED SITE: ICD-10-CM

## 2018-06-05 DIAGNOSIS — Z85.46 HISTORY OF PROSTATE CANCER: ICD-10-CM

## 2018-06-05 PROCEDURE — 84443 ASSAY THYROID STIM HORMONE: CPT

## 2018-06-05 PROCEDURE — 84153 ASSAY OF PSA TOTAL: CPT

## 2018-06-05 PROCEDURE — 84550 ASSAY OF BLOOD/URIC ACID: CPT

## 2018-06-05 PROCEDURE — 80053 COMPREHEN METABOLIC PANEL: CPT

## 2018-06-05 PROCEDURE — 36415 COLL VENOUS BLD VENIPUNCTURE: CPT

## 2018-06-05 PROCEDURE — 85025 COMPLETE CBC W/AUTO DIFF WBC: CPT

## 2018-06-05 PROCEDURE — 80061 LIPID PANEL: CPT

## 2018-06-19 RX ORDER — ATORVASTATIN CALCIUM 40 MG/1
TABLET, FILM COATED ORAL
Qty: 90 TABLET | Refills: 0 | Status: SHIPPED | OUTPATIENT
Start: 2018-06-19 | End: 2018-09-26

## 2018-06-19 NOTE — TELEPHONE ENCOUNTER
Cholesterol Medications  Protocol Criteria:  · Appointment scheduled in the past 12 months or in the next 3 months  · ALT & LDL on file in the past 12 months  · ALT result < 80  · LDL result <130   Recent Outpatient Visits            1 month ago Encounter

## 2018-06-30 RX ORDER — METOPROLOL TARTRATE 100 MG/1
TABLET ORAL
Qty: 180 TABLET | Refills: 0 | Status: SHIPPED | OUTPATIENT
Start: 2018-06-30 | End: 2018-10-01

## 2018-07-13 RX ORDER — AMLODIPINE BESYLATE 10 MG/1
TABLET ORAL
Qty: 90 TABLET | Refills: 0 | Status: SHIPPED | OUTPATIENT
Start: 2018-07-13 | End: 2018-10-01

## 2018-07-31 DIAGNOSIS — I10 HYPERTENSION, UNSPECIFIED TYPE: ICD-10-CM

## 2018-08-01 RX ORDER — QUINAPRIL 40 MG/1
TABLET ORAL
Qty: 90 TABLET | Refills: 0 | Status: SHIPPED | OUTPATIENT
Start: 2018-08-01 | End: 2018-10-18

## 2018-08-23 ENCOUNTER — PATIENT MESSAGE (OUTPATIENT)
Dept: INTERNAL MEDICINE CLINIC | Facility: CLINIC | Age: 68
End: 2018-08-23

## 2018-08-23 NOTE — TELEPHONE ENCOUNTER
From: Mahendra Kern  To: Dk Pineda MD  Sent: 8/23/2018 12:59 PM CDT  Subject: Non-Urgent Medical Question    is it ok to take a magnesium supplement/magnesium oxide -400 mg daily?     LADONNA Metzger

## 2018-09-27 RX ORDER — ATORVASTATIN CALCIUM 40 MG/1
TABLET, FILM COATED ORAL
Qty: 90 TABLET | Refills: 0 | Status: SHIPPED | OUTPATIENT
Start: 2018-09-27 | End: 2018-12-22

## 2018-10-03 RX ORDER — METOPROLOL TARTRATE 100 MG/1
TABLET ORAL
Qty: 180 TABLET | Refills: 0 | Status: SHIPPED | OUTPATIENT
Start: 2018-10-03 | End: 2018-12-28

## 2018-10-03 RX ORDER — AMLODIPINE BESYLATE 10 MG/1
TABLET ORAL
Qty: 90 TABLET | Refills: 0 | Status: SHIPPED | OUTPATIENT
Start: 2018-10-03 | End: 2018-12-28

## 2018-10-03 RX ORDER — HYDROCHLOROTHIAZIDE 25 MG/1
TABLET ORAL
Qty: 90 TABLET | Refills: 0 | Status: SHIPPED | OUTPATIENT
Start: 2018-10-03 | End: 2018-12-28

## 2018-10-03 NOTE — TELEPHONE ENCOUNTER
Hypertensive Medications  Protocol Criteria:  · Appointment scheduled in the past 6 months or in the next 3 months  · BMP or CMP in the past 12 months  · Creatinine result < 2  Recent Outpatient Visits            5 months ago Encounter for annual health ex

## 2018-10-16 ENCOUNTER — HOSPITAL ENCOUNTER (OUTPATIENT)
Dept: NUCLEAR MEDICINE | Facility: HOSPITAL | Age: 68
Discharge: HOME | End: 2018-10-16
Attending: INTERNAL MEDICINE
Payer: MEDICARE

## 2018-10-16 ENCOUNTER — HOSPITAL ENCOUNTER (OUTPATIENT)
Dept: CV DIAGNOSTICS | Facility: HOSPITAL | Age: 68
Discharge: HOME | End: 2018-10-16
Attending: INTERNAL MEDICINE
Payer: MEDICARE

## 2018-10-16 DIAGNOSIS — I25.10 ATHEROSCLEROSIS OF NATIVE CORONARY ARTERY OF NATIVE HEART WITHOUT ANGINA PECTORIS: ICD-10-CM

## 2018-10-16 PROCEDURE — 78452 HT MUSCLE IMAGE SPECT MULT: CPT | Performed by: INTERNAL MEDICINE

## 2018-10-16 PROCEDURE — 93016 CV STRESS TEST SUPVJ ONLY: CPT | Performed by: INTERNAL MEDICINE

## 2018-10-16 PROCEDURE — 93017 CV STRESS TEST TRACING ONLY: CPT | Performed by: INTERNAL MEDICINE

## 2018-10-16 PROCEDURE — 93018 CV STRESS TEST I&R ONLY: CPT | Performed by: INTERNAL MEDICINE

## 2018-10-16 PROCEDURE — A4216 STERILE WATER/SALINE, 10 ML: HCPCS

## 2018-10-16 RX ORDER — 0.9 % SODIUM CHLORIDE 0.9 %
VIAL (ML) INJECTION
Status: COMPLETED
Start: 2018-10-16 | End: 2018-10-16

## 2018-10-16 RX ADMIN — 0.9 % SODIUM CHLORIDE: 0.9 % VIAL (ML) INJECTION at 13:48:00

## 2018-10-18 ENCOUNTER — OFFICE VISIT (OUTPATIENT)
Dept: INTERNAL MEDICINE CLINIC | Facility: CLINIC | Age: 68
End: 2018-10-18
Payer: MEDICARE

## 2018-10-18 VITALS
HEIGHT: 68 IN | SYSTOLIC BLOOD PRESSURE: 134 MMHG | TEMPERATURE: 98 F | BODY MASS INDEX: 32.89 KG/M2 | HEART RATE: 56 BPM | RESPIRATION RATE: 20 BRPM | DIASTOLIC BLOOD PRESSURE: 72 MMHG | WEIGHT: 217 LBS

## 2018-10-18 DIAGNOSIS — M10.9 GOUT, UNSPECIFIED CAUSE, UNSPECIFIED CHRONICITY, UNSPECIFIED SITE: ICD-10-CM

## 2018-10-18 DIAGNOSIS — I25.10 ATHEROSCLEROSIS OF NATIVE CORONARY ARTERY OF NATIVE HEART WITHOUT ANGINA PECTORIS: ICD-10-CM

## 2018-10-18 DIAGNOSIS — Z85.46 HISTORY OF PROSTATE CANCER: ICD-10-CM

## 2018-10-18 DIAGNOSIS — I10 HYPERTENSION, UNSPECIFIED TYPE: Primary | ICD-10-CM

## 2018-10-18 DIAGNOSIS — R73.09 ELEVATED GLUCOSE: ICD-10-CM

## 2018-10-18 DIAGNOSIS — E78.5 HYPERLIPIDEMIA, UNSPECIFIED HYPERLIPIDEMIA TYPE: ICD-10-CM

## 2018-10-18 PROCEDURE — 99214 OFFICE O/P EST MOD 30 MIN: CPT | Performed by: INTERNAL MEDICINE

## 2018-10-18 PROCEDURE — G0463 HOSPITAL OUTPT CLINIC VISIT: HCPCS | Performed by: INTERNAL MEDICINE

## 2018-10-18 RX ORDER — QUINAPRIL 40 MG/1
40 TABLET ORAL
Qty: 90 TABLET | Refills: 1 | Status: SHIPPED | OUTPATIENT
Start: 2018-10-18 | End: 2019-03-05

## 2018-10-18 NOTE — PROGRESS NOTES
HPI:    Patient ID: Sukhdev Adam is a 77year old male. HPI  Patient is here for follow-up on chronic medical issues as listed below. Last seen here in April for annual wellness visit. She had blood work done in June.   Normal except for a blood Smokeless tobacco: Never Used    Alcohol use: Yes      Alcohol/week: 0.0 oz      Comment: wine, 2-3 glasses weekly    Drug use: No           Review of Systems   Constitutional: Negative for chills, fatigue and fever. HENT: Negative for hearing loss.     E well-developed and well-nourished. HENT:   Nose: Nose normal.   Mouth/Throat: Oropharynx is clear and moist.   Eyes: Pupils are equal, round, and reactive to light.    Cardiovascular: Normal rate, regular rhythm, normal heart sounds and intact distal puls

## 2018-12-23 RX ORDER — ATORVASTATIN CALCIUM 40 MG/1
TABLET, FILM COATED ORAL
Qty: 90 TABLET | Refills: 0 | Status: SHIPPED | OUTPATIENT
Start: 2018-12-23 | End: 2019-03-26

## 2018-12-24 ENCOUNTER — OFFICE VISIT (OUTPATIENT)
Dept: FAMILY MEDICINE CLINIC | Facility: CLINIC | Age: 68
End: 2018-12-24
Payer: MEDICARE

## 2018-12-24 ENCOUNTER — NURSE TRIAGE (OUTPATIENT)
Dept: OTHER | Age: 68
End: 2018-12-24

## 2018-12-24 VITALS
WEIGHT: 213 LBS | BODY MASS INDEX: 32.28 KG/M2 | HEIGHT: 68 IN | TEMPERATURE: 98 F | HEART RATE: 71 BPM | SYSTOLIC BLOOD PRESSURE: 121 MMHG | DIASTOLIC BLOOD PRESSURE: 74 MMHG

## 2018-12-24 DIAGNOSIS — L20.9 ATOPIC DERMATITIS, UNSPECIFIED TYPE: Primary | ICD-10-CM

## 2018-12-24 PROCEDURE — G0463 HOSPITAL OUTPT CLINIC VISIT: HCPCS | Performed by: PHYSICIAN ASSISTANT

## 2018-12-24 PROCEDURE — 99213 OFFICE O/P EST LOW 20 MIN: CPT | Performed by: PHYSICIAN ASSISTANT

## 2018-12-24 RX ORDER — METHYLPREDNISOLONE 4 MG/1
TABLET ORAL
Qty: 1 KIT | Refills: 0 | Status: SHIPPED | OUTPATIENT
Start: 2018-12-24 | End: 2019-03-12 | Stop reason: ALTCHOICE

## 2018-12-24 NOTE — TELEPHONE ENCOUNTER
Action Requested: Summary for Provider     []  Critical Lab, Recommendations Needed  [] Need Additional Advice  []   FYI    []   Need Orders  [] Need Medications Sent to Pharmacy  []  Other     SUMMARY: Appt with Kezia Lewis today 12/24/18 at 11:45 am Dusty

## 2018-12-24 NOTE — PROGRESS NOTES
HPI:   Rash   This is a new problem. The current episode started in the past 7 days. The problem is unchanged. The affected locations include the abdomen, back and chest. The rash is characterized by redness and itchiness.  He was exposed to a new detergent Diagnosis Date   • Ankle fracture, right 2008   • Atherosclerosis of coronary artery     Negative nuclear chemical stress test of the [facility], [place]) Oct 2014   • CAD (coronary artery disease)     x2; chemical stress test normal, done in [facility]  Service: Not Asked        Blood Transfusions: Not Asked        Caffeine Concern: Yes          coffee, 2 cups (no more than 2)        Occupational Exposure: Not Asked        Hobby Hazards: Not Asked        Sleep Concern: Not Asked        Stre Problem List Items Addressed This Visit        Infectious/Inflammatory    Atopic dermatitis - Primary     Start Medro DosePak.          Relevant Medications    methylPREDNISolone (MEDROL) 4 MG Oral Tablet Therapy Pack          Discussed plan of care with pt

## 2018-12-28 RX ORDER — AMLODIPINE BESYLATE 10 MG/1
TABLET ORAL
Qty: 90 TABLET | Refills: 0 | Status: SHIPPED | OUTPATIENT
Start: 2018-12-28 | End: 2019-03-26

## 2018-12-28 RX ORDER — HYDROCHLOROTHIAZIDE 25 MG/1
TABLET ORAL
Qty: 90 TABLET | Refills: 0 | Status: SHIPPED | OUTPATIENT
Start: 2018-12-28 | End: 2019-03-26

## 2018-12-28 RX ORDER — METOPROLOL TARTRATE 100 MG/1
TABLET ORAL
Qty: 180 TABLET | Refills: 0 | Status: SHIPPED | OUTPATIENT
Start: 2018-12-28 | End: 2019-03-26

## 2019-01-07 ENCOUNTER — TELEPHONE (OUTPATIENT)
Dept: OTHER | Age: 69
End: 2019-01-07

## 2019-01-07 RX ORDER — METHYLPREDNISOLONE 4 MG/1
TABLET ORAL
Qty: 21 TABLET | Refills: 0 | OUTPATIENT
Start: 2019-01-07

## 2019-01-07 NOTE — TELEPHONE ENCOUNTER
LMTCB  Transfer to triage    The patient is requesting MethylPREDNISolone Oral Tablet Therapy Pack 4 MG. We need to triage the patient.   Please see refill encounter on 1/4/19

## 2019-01-07 NOTE — TELEPHONE ENCOUNTER
Pt stated he was seen 12/24/18 for rash, given rx methylprednisolone pk- which resolved, was told could be allergies r/t detergent but was not the Shingles as he thought   Stated Pink rash came back on Friday shoulders,under his arms, along his sides, itch

## 2019-01-07 NOTE — TELEPHONE ENCOUNTER
RN pls call pt and verify the need of rx refill, pls triage or offer ov if needed, thanks. No future appointments. No Protocol on this med.      Requested Prescriptions     Pending Prescriptions Disp Refills   • methylPREDNISolone 4 MG Oral Table

## 2019-02-07 RX ORDER — METHYLPREDNISOLONE 4 MG/1
TABLET ORAL
Qty: 21 TABLET | Refills: 0 | OUTPATIENT
Start: 2019-02-07

## 2019-02-07 NOTE — TELEPHONE ENCOUNTER
Pt has a rash that is intermittent and itchy. Ointment was prescribed to handle itch but not effective. Pt then seen for dermatitis in office and given steroid. Pt wanted a refill . Advised F/U OV , pt respectfully declined.   Chooses to see dermatologis

## 2019-02-13 ENCOUNTER — OFFICE VISIT (OUTPATIENT)
Dept: DERMATOLOGY CLINIC | Facility: CLINIC | Age: 69
End: 2019-02-13
Payer: MEDICARE

## 2019-02-13 ENCOUNTER — LAB ENCOUNTER (OUTPATIENT)
Dept: LAB | Age: 69
End: 2019-02-13
Attending: DERMATOLOGY
Payer: MEDICARE

## 2019-02-13 VITALS — BODY MASS INDEX: 31.1 KG/M2 | HEIGHT: 69 IN | WEIGHT: 210 LBS

## 2019-02-13 DIAGNOSIS — L50.9 URTICARIA: Primary | ICD-10-CM

## 2019-02-13 DIAGNOSIS — L50.9 URTICARIA: ICD-10-CM

## 2019-02-13 DIAGNOSIS — L50.1 CHRONIC IDIOPATHIC URTICARIA: ICD-10-CM

## 2019-02-13 LAB
ALBUMIN SERPL-MCNC: 3.6 G/DL (ref 3.4–5)
ALBUMIN/GLOB SERPL: 1.1 {RATIO} (ref 1–2)
ALP LIVER SERPL-CCNC: 104 U/L (ref 45–117)
ALT SERPL-CCNC: 24 U/L (ref 16–61)
ANION GAP SERPL CALC-SCNC: 5 MMOL/L (ref 0–18)
AST SERPL-CCNC: 12 U/L (ref 15–37)
BASOPHILS # BLD AUTO: 0 X10(3) UL (ref 0–0.2)
BASOPHILS NFR BLD AUTO: 0 %
BILIRUB SERPL-MCNC: 0.4 MG/DL (ref 0.1–2)
BUN BLD-MCNC: 19 MG/DL (ref 7–18)
BUN/CREAT SERPL: 23.8 (ref 10–20)
CALCIUM BLD-MCNC: 9.4 MG/DL (ref 8.5–10.1)
CHLORIDE SERPL-SCNC: 107 MMOL/L (ref 98–107)
CO2 SERPL-SCNC: 30 MMOL/L (ref 21–32)
CREAT BLD-MCNC: 0.8 MG/DL (ref 0.7–1.3)
DEPRECATED RDW RBC AUTO: 46.9 FL (ref 35.1–46.3)
EOSINOPHIL # BLD AUTO: 0.11 X10(3) UL (ref 0–0.7)
EOSINOPHIL NFR BLD AUTO: 1.5 %
ERYTHROCYTE [DISTWIDTH] IN BLOOD BY AUTOMATED COUNT: 14.3 % (ref 11–15)
GLOBULIN PLAS-MCNC: 3.4 G/DL (ref 2.8–4.4)
GLUCOSE BLD-MCNC: 248 MG/DL (ref 70–99)
HCT VFR BLD AUTO: 37.9 % (ref 39–53)
HGB BLD-MCNC: 12.5 G/DL (ref 13–17.5)
IMM GRANULOCYTES # BLD AUTO: 0.02 X10(3) UL (ref 0–1)
IMM GRANULOCYTES NFR BLD: 0.3 %
LYMPHOCYTES # BLD AUTO: 1 X10(3) UL (ref 1–4)
LYMPHOCYTES NFR BLD AUTO: 13.3 %
M PROTEIN MFR SERPL ELPH: 7 G/DL (ref 6.4–8.2)
MCH RBC QN AUTO: 29.8 PG (ref 26–34)
MCHC RBC AUTO-ENTMCNC: 33 G/DL (ref 31–37)
MCV RBC AUTO: 90.5 FL (ref 80–100)
MONOCYTES # BLD AUTO: 0.35 X10(3) UL (ref 0.1–1)
MONOCYTES NFR BLD AUTO: 4.6 %
NEUTROPHILS # BLD AUTO: 6.05 X10 (3) UL (ref 1.5–7.7)
NEUTROPHILS # BLD AUTO: 6.05 X10(3) UL (ref 1.5–7.7)
NEUTROPHILS NFR BLD AUTO: 80.3 %
OSMOLALITY SERPL CALC.SUM OF ELEC: 305 MOSM/KG (ref 275–295)
PLATELET # BLD AUTO: 170 10(3)UL (ref 150–450)
POTASSIUM SERPL-SCNC: 3.7 MMOL/L (ref 3.5–5.1)
RBC # BLD AUTO: 4.19 X10(6)UL (ref 3.8–5.8)
SODIUM SERPL-SCNC: 142 MMOL/L (ref 136–145)
WBC # BLD AUTO: 7.5 X10(3) UL (ref 4–11)

## 2019-02-13 PROCEDURE — 85025 COMPLETE CBC W/AUTO DIFF WBC: CPT

## 2019-02-13 PROCEDURE — 80053 COMPREHEN METABOLIC PANEL: CPT

## 2019-02-13 PROCEDURE — G0463 HOSPITAL OUTPT CLINIC VISIT: HCPCS | Performed by: DERMATOLOGY

## 2019-02-13 PROCEDURE — 36415 COLL VENOUS BLD VENIPUNCTURE: CPT

## 2019-02-13 PROCEDURE — 99202 OFFICE O/P NEW SF 15 MIN: CPT | Performed by: DERMATOLOGY

## 2019-02-13 RX ORDER — MONTELUKAST SODIUM 10 MG/1
10 TABLET ORAL NIGHTLY
Qty: 30 TABLET | Refills: 11 | Status: SHIPPED | OUTPATIENT
Start: 2019-02-13 | End: 2019-06-04

## 2019-02-13 RX ORDER — CETIRIZINE HYDROCHLORIDE 10 MG/1
10 TABLET ORAL DAILY
Qty: 30 TABLET | Refills: 5 | Status: SHIPPED | OUTPATIENT
Start: 2019-02-13 | End: 2019-12-02

## 2019-02-13 RX ORDER — PREDNISONE 10 MG/1
TABLET ORAL
Qty: 40 TABLET | Refills: 0 | Status: SHIPPED | OUTPATIENT
Start: 2019-02-13 | End: 2019-03-12 | Stop reason: ALTCHOICE

## 2019-02-15 NOTE — PROGRESS NOTES
Pt informed of lab results and reccs with verbal understanding. Pt states he is doing well and is 95% clear with the use of TAC 0.1% cream, cetrizine, and singulair. ARAVIND.

## 2019-02-24 NOTE — PROGRESS NOTES
Elie Mondragon is a 77year old male. Patient presents with:  Rash: all over body since 12/25, red raised patches to turnk and arms. Itching terribly, comes and goes, now spreading. OTC hct, benadryl and triamcinolone cream minimally effective. (MEDROL) 4 MG Oral Tablet Therapy Pack As directed.  Disp: 1 kit Rfl: 0      Past Medical History:   Diagnosis Date   • Ankle fracture, right 2008   • Atherosclerosis of coronary artery     Negative nuclear chemical stress test of the heart (Melburn Castor Rfl: 1   Vitamin D, Cholecalciferol, 1000 units Oral Cap Take by mouth. Disp:  Rfl:    B Complex Vitamins (VITAMIN-B COMPLEX) Oral Tab Take by mouth. Disp:  Rfl:    Dutasteride 0.5 MG Oral Cap Take 1 capsule (0.5 mg total) by mouth daily.  Disp: 30 capsule Smoker      Smokeless tobacco: Never Used    Substance and Sexual Activity      Alcohol use:  Yes        Alcohol/week: 0.0 oz        Comment: wine, 2-3 glasses weekly      Drug use: No      Sexual activity: Not Currently        Partners: Female    Lifestyle note.    Patient has had to switch to vitamins changed to Centrum Silver from his usual.  Stopped several weeks ago without any improvement. Had changed soaps has changed back no problems. Switch to milder products with no relief.   Had started omega-3 di moisturizers, creams soaps cleansers detergent etc. reviewed with patient. Potential allergen, irritants reviewed as well. Pathophysiology reviewed. Consider Contact/ allergy in differential.  Consider  testing.   High likelihood of not finding any speci

## 2019-03-05 ENCOUNTER — NURSE TRIAGE (OUTPATIENT)
Dept: OTHER | Age: 69
End: 2019-03-05

## 2019-03-05 NOTE — TELEPHONE ENCOUNTER
Action Requested: Summary for Provider     []  Critical Lab, Recommendations Needed  [] Need Additional Advice  [x]   FYI    []   Need Orders  [] Need Medications Sent to Pharmacy  []  Other     SUMMARY: Patient calling from [place] and stated, \"I went to Assessment (questions to ask the caller)  Do you consider this a medical emergency?: No  Can you access 911?: Yes  Do you have any pain?: No  Do you have a fever?: No  Additional Assessments  Associated Symptoms: no associated symptoms(had swelling to lips

## 2019-03-06 NOTE — TELEPHONE ENCOUNTER
Called patient regarding discontinuance of the quinapril HCl 40 mg daily.  I also discontinued it from his current medication list. Patient told me this morning that he would be flying home tonight from [place] so I did leave a detailed message on his phone

## 2019-03-06 NOTE — TELEPHONE ENCOUNTER
From the history that is been provided, I also would be concerned about an allergic reaction to the quinapril.   Even though he is been on it for a while, some people develop the sort of reaction  with the swelling in the face and lips at any time while stefania

## 2019-03-07 NOTE — TELEPHONE ENCOUNTER
Patient stated that swelling of the lips and jaw and hives have all cleared up. Not having any symptoms currently. Patient not taking the quinapril. Blood pressure today at 3:45pm was 136/73.  Patient will continue monitoring blood pressure, watch the salt

## 2019-03-07 NOTE — TELEPHONE ENCOUNTER
Patient was left a message to call back. Transfer to [phone]  F/u call made to patient.   (has appt 3/12/19)

## 2019-03-12 ENCOUNTER — OFFICE VISIT (OUTPATIENT)
Dept: INTERNAL MEDICINE CLINIC | Facility: CLINIC | Age: 69
End: 2019-03-12
Payer: MEDICARE

## 2019-03-12 VITALS
DIASTOLIC BLOOD PRESSURE: 70 MMHG | HEART RATE: 72 BPM | SYSTOLIC BLOOD PRESSURE: 130 MMHG | HEIGHT: 68 IN | TEMPERATURE: 99 F | BODY MASS INDEX: 32.53 KG/M2 | WEIGHT: 214.63 LBS | RESPIRATION RATE: 18 BRPM

## 2019-03-12 DIAGNOSIS — I10 HYPERTENSION, UNSPECIFIED TYPE: ICD-10-CM

## 2019-03-12 DIAGNOSIS — R73.09 ELEVATED GLUCOSE: ICD-10-CM

## 2019-03-12 DIAGNOSIS — T78.3XXD ANGIOEDEMA, SUBSEQUENT ENCOUNTER: Primary | ICD-10-CM

## 2019-03-12 PROCEDURE — G0463 HOSPITAL OUTPT CLINIC VISIT: HCPCS | Performed by: INTERNAL MEDICINE

## 2019-03-12 PROCEDURE — 99214 OFFICE O/P EST MOD 30 MIN: CPT | Performed by: INTERNAL MEDICINE

## 2019-03-12 NOTE — PROGRESS NOTES
HPI:    Patient ID: Yehuda Pearson is a 77year old male. HPI  Patient is here for follow-up on recent allergic reaction. In the recent past he was having issues with hives. Been going on for the last few months.   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 He had some hives before that time as well. I advised him to stop the Singulair at this time. If he does okay with no hives after a couple of weeks, he can also stop the daily Zyrtec.     2. Hypertension, unspecified type  He is now

## 2019-03-12 NOTE — PATIENT INSTRUCTIONS
Stop the Singulair/montelukast now. Watch for any recurrent hives. If doing well after 1-2 weeks, then stop the Zyrtec/Certrizine.

## 2019-03-20 ENCOUNTER — PATIENT MESSAGE (OUTPATIENT)
Dept: INTERNAL MEDICINE CLINIC | Facility: CLINIC | Age: 69
End: 2019-03-20

## 2019-03-20 ENCOUNTER — TELEPHONE (OUTPATIENT)
Dept: INTERNAL MEDICINE CLINIC | Facility: CLINIC | Age: 69
End: 2019-03-20

## 2019-03-20 NOTE — TELEPHONE ENCOUNTER
Pt called in said he has questions about medication he is taking for hives      Pt said he is taking cetirizine 10 MG and he is still getting hives and has been getting hives 5 out of last 6 days in spite of taking medication, however he was given predniso

## 2019-03-21 NOTE — TELEPHONE ENCOUNTER
I would be very hesitant to okay any sort of long-term use of prednisone. He can have a lot of very serious adverse effects when taken over a long period of time. It should be fine if he is just taking it for a few weeks or so.   In regard to the blood pr

## 2019-03-21 NOTE — TELEPHONE ENCOUNTER
Patient returned call for follow up.  Reports ongoing issue with on and off symptoms of hives that appear to upper body/arms, has been seen in ED for this, and followed up with the Dermatologist also, LOV 3/21/19 with Dr Letty Mathis, reports no hives at [facility] but [facility]

## 2019-03-22 NOTE — TELEPHONE ENCOUNTER
Pt returned call. Reviewed doctor's recommendations as noted below. Pt states he is taking Prednisone as prescribed by Dr. Jonh Schuler 2/13/19, states symptoms have cleared up after he started taking this med.  Pt is on day three of the medication but is not stefania

## 2019-03-26 RX ORDER — HYDROCHLOROTHIAZIDE 25 MG/1
TABLET ORAL
Qty: 90 TABLET | Refills: 0 | Status: SHIPPED | OUTPATIENT
Start: 2019-03-26 | End: 2019-06-25

## 2019-03-26 RX ORDER — METOPROLOL TARTRATE 100 MG/1
TABLET ORAL
Qty: 180 TABLET | Refills: 0 | Status: SHIPPED | OUTPATIENT
Start: 2019-03-26 | End: 2019-06-25

## 2019-03-26 RX ORDER — AMLODIPINE BESYLATE 10 MG/1
TABLET ORAL
Qty: 90 TABLET | Refills: 0 | Status: SHIPPED | OUTPATIENT
Start: 2019-03-26 | End: 2019-06-25

## 2019-03-26 RX ORDER — ATORVASTATIN CALCIUM 40 MG/1
TABLET, FILM COATED ORAL
Qty: 90 TABLET | Refills: 0 | Status: SHIPPED | OUTPATIENT
Start: 2019-03-26 | End: 2019-06-25

## 2019-05-28 ENCOUNTER — APPOINTMENT (OUTPATIENT)
Dept: LAB | Age: 69
End: 2019-05-28
Attending: INTERNAL MEDICINE
Payer: MEDICARE

## 2019-05-28 DIAGNOSIS — R73.09 ELEVATED GLUCOSE: ICD-10-CM

## 2019-05-28 PROCEDURE — 82947 ASSAY GLUCOSE BLOOD QUANT: CPT

## 2019-05-28 PROCEDURE — 36415 COLL VENOUS BLD VENIPUNCTURE: CPT

## 2019-05-28 PROCEDURE — 83036 HEMOGLOBIN GLYCOSYLATED A1C: CPT

## 2019-06-04 ENCOUNTER — OFFICE VISIT (OUTPATIENT)
Dept: INTERNAL MEDICINE CLINIC | Facility: CLINIC | Age: 69
End: 2019-06-04
Payer: MEDICARE

## 2019-06-04 VITALS
SYSTOLIC BLOOD PRESSURE: 128 MMHG | RESPIRATION RATE: 20 BRPM | HEIGHT: 68 IN | HEART RATE: 70 BPM | BODY MASS INDEX: 32.71 KG/M2 | WEIGHT: 215.81 LBS | DIASTOLIC BLOOD PRESSURE: 82 MMHG | TEMPERATURE: 98 F

## 2019-06-04 DIAGNOSIS — E78.5 HYPERLIPIDEMIA, UNSPECIFIED HYPERLIPIDEMIA TYPE: ICD-10-CM

## 2019-06-04 DIAGNOSIS — Z00.00 ENCOUNTER FOR ANNUAL HEALTH EXAMINATION: Primary | ICD-10-CM

## 2019-06-04 DIAGNOSIS — L50.9 HIVES: ICD-10-CM

## 2019-06-04 DIAGNOSIS — E11.9 TYPE 2 DIABETES MELLITUS WITHOUT COMPLICATION, WITHOUT LONG-TERM CURRENT USE OF INSULIN (HCC): ICD-10-CM

## 2019-06-04 DIAGNOSIS — M10.9 GOUT, UNSPECIFIED CAUSE, UNSPECIFIED CHRONICITY, UNSPECIFIED SITE: ICD-10-CM

## 2019-06-04 DIAGNOSIS — I10 HYPERTENSION, UNSPECIFIED TYPE: ICD-10-CM

## 2019-06-04 DIAGNOSIS — I25.10 ATHEROSCLEROSIS OF NATIVE CORONARY ARTERY OF NATIVE HEART WITHOUT ANGINA PECTORIS: ICD-10-CM

## 2019-06-04 DIAGNOSIS — Z85.46 HISTORY OF PROSTATE CANCER: ICD-10-CM

## 2019-06-04 PROCEDURE — G0439 PPPS, SUBSEQ VISIT: HCPCS | Performed by: INTERNAL MEDICINE

## 2019-06-04 RX ORDER — DORZOLAMIDE HCL 20 MG/ML
SOLUTION/ DROPS OPHTHALMIC
COMMUNITY
Start: 2019-05-21 | End: 2019-06-04

## 2019-06-04 RX ORDER — DORZOLAMIDE HCL 20 MG/ML
1 SOLUTION/ DROPS OPHTHALMIC 2 TIMES DAILY
COMMUNITY
Start: 2019-05-21

## 2019-06-04 NOTE — PATIENT INSTRUCTIONS
London Watson's SCREENING SCHEDULE   Tests on this list are recommended by your physician but may not be covered, or covered at this frequency, by your insurer. Please check with your insurance carrier before scheduling to verify coverage.     PREVEN Screening Covered up to Age 76     Colonoscopy Screen   Covered every 10 years- more often if abnormal Colonoscopy due on 06/27/2023 Update Health Maintenance if applicable    Flex Sigmoidoscopy Screen  Covered every 5 years No results found for this or an Medicare Part B) No orders found for this or any previous visit.  This may be covered with your prescription benefits, but Medicare does not cover unless Medically needed    Zoster (Not covered by Medicare Part B) No orders found for this or any previous vi

## 2019-06-04 NOTE — PROGRESS NOTES
HPI:   Pankaj Alaniz is a 77year old male who presents for a Medicare Subsequent Annual Wellness visit (Pt already had Initial Annual Wellness). Patient is here for Medicare annual wellness visit and follow-up on chronic medical issues.   Also wi weeks)?: Not at all  Feeling down, depressed, or hopeless (over the last two weeks)?: Not at all  PHQ-2 SCORE: 0     Advanced Directive:   He does NOT have a Living Will on file in [address] [place] [address] Rd.    The patient has this document but we do not have it in Epic, and pa allergic to quinapril. CURRENT MEDICATIONS:     Outpatient Medications Marked as Taking for the 6/4/19 encounter (Office Visit) with Vallarie Bernheim, MD:  Dorzolamide HCl 2 % Ophthalmic Solution 1 drop. CETIRIZINE HCL OR Take by mouth.    [facility] palpitations. Gastrointestinal: Negative for nausea, vomiting, abdominal pain, diarrhea and constipation. Genitourinary: Positive for sexual dysfunction. Negative for dysuria and hematuria. Musculoskeletal: Positive for joint pain.    Skin: Positive f avoid social activities because I cannot hear well and fear I will reply improperly:  No   Family members and friends have told me they think I may have hearing loss:   No               Visual Acuity  Right Eye Visual Acuity: Corrected Right Eye Chart Acuit 04/17/2017   Deferred Date(s) Deferred   • >=3 YRS TRI  MULTIDOSE VIAL (57618) FLU CLINIC 12/14/2017        ASSESSMENT AND OTHER RELEVANT CHRONIC CONDITIONS:   Hamilton Allen is a 77year old male who presents for a Medicare Assessment.      PLAN SUMMAR follow-ups on file.      Uri Jara MD, 6/4/2019     General Health     In the past six months, have you lost more than 10 pounds without trying?: 2 - No  Has your appetite been poor?: No  How does the patient maintain a good energy level?: Daily Walks Pneumococcal 23 (Pneumovax)  Covered Once after 65 04/26/2018 Please get once after your 65th birthday    Hepatitis B for Moderate/High Risk No vaccine history found Medium/high risk factors:   End-stage renal disease   Hemophiliacs who received Factor VII Detail Level: Detailed Detail Level: Generalized Detail Level: Zone

## 2019-06-10 ENCOUNTER — NURSE TRIAGE (OUTPATIENT)
Dept: INTERNAL MEDICINE CLINIC | Facility: CLINIC | Age: 69
End: 2019-06-10

## 2019-06-10 ENCOUNTER — OFFICE VISIT (OUTPATIENT)
Dept: INTERNAL MEDICINE CLINIC | Facility: CLINIC | Age: 69
End: 2019-06-10
Payer: MEDICARE

## 2019-06-10 VITALS
HEART RATE: 71 BPM | BODY MASS INDEX: 32.58 KG/M2 | SYSTOLIC BLOOD PRESSURE: 131 MMHG | DIASTOLIC BLOOD PRESSURE: 81 MMHG | HEIGHT: 68 IN | TEMPERATURE: 98 F | WEIGHT: 215 LBS

## 2019-06-10 DIAGNOSIS — T78.3XXS ANGIOEDEMA, SEQUELA: ICD-10-CM

## 2019-06-10 DIAGNOSIS — M25.521 RIGHT ELBOW PAIN: Primary | ICD-10-CM

## 2019-06-10 PROCEDURE — 99213 OFFICE O/P EST LOW 20 MIN: CPT | Performed by: INTERNAL MEDICINE

## 2019-06-10 PROCEDURE — G0463 HOSPITAL OUTPT CLINIC VISIT: HCPCS | Performed by: INTERNAL MEDICINE

## 2019-06-10 RX ORDER — ACETAMINOPHEN 500 MG
500 TABLET ORAL EVERY 6 HOURS PRN
COMMUNITY
End: 2019-12-02

## 2019-06-10 NOTE — PROGRESS NOTES
Lexis Garcia is a 77year old male.   Patient presents with:  Arm Pain: had a injury on Friday, picked up a heavy weight, is now having pain and redness      HPI:   Pt comes as an urgent visit   C/c arm pain - right   C/o arm pain x 4 days -- was lift Rfl:    HYDROCHLOROTHIAZIDE 25 MG Oral Tab TAKE ONE TABLET BY MOUTH ONE TIME DAILY Disp: 90 tablet Rfl: 0   ATORVASTATIN 40 MG Oral Tab TAKE ONE TABLET BY MOUTH ONE TIME DAILY Disp: 90 tablet Rfl: 0   METOPROLOL TARTRATE 100 MG Oral Tab TAKE ONE TABLET BY unusual skin lesions or rashes, just the swelling   RESPIRATORY: denies shortness of breath, cough, wheezing  CARDIOVASCULAR: denies chest pain on exertion, palpitations, + swelling in feet a?  At the end of the day   GI: denies abdominal pain, + heartburn-

## 2019-06-10 NOTE — TELEPHONE ENCOUNTER
Action Requested: Summary for Provider     []  Critical Lab, Recommendations Needed  [] Need Additional Advice  []   FYI    []   Need Orders  [] Need Medications Sent to Pharmacy  []  Other     SUMMARY: .Per protocol advised OV today and pt scheduled for 2

## 2019-06-10 NOTE — PATIENT INSTRUCTIONS
Angioedema  Angioedema (WM-gcb-zr-eh-GIANFRANCO-muh) is a sudden appearance of swollen patches (edema) on the skin or mucous membranes. It most often involves the face, lips, mouth, tongue, back of throat, or vocal cords.  It may also occur in other places, such The most common cause of angioedema is a reaction to a class of medicines called ACE inhibitors. These are used to treat high blood pressure. ACE inhibitors include captopril, enalapril, and lisinopril.  Angiodema can happen even after you have been taking · Make sure you do not scratch areas of the body that had a reaction. This will help prevent infection.   · Stay away from air pollution, tobacco, and wood smoke. Also stay away from cold temperatures. These things can make allergy symptoms worse.   · Try t © 8339-7712 [employer] [address]. [address] [facility], [address]. All rights reserved. This information is not intended as a substitute for professional medical care. Always follow your healthcare professional's instructions.         KEERTHI Call your healthcare provider right away if any of these occur:  · Fever of 100.4°F (38°C) or higher, or as directed by your healthcare provider  · Chills  · Increased pain or swelling in the injured body part  · Injured body part becomes cold, blue, numb,

## 2019-06-12 ENCOUNTER — TELEPHONE (OUTPATIENT)
Dept: INTERNAL MEDICINE CLINIC | Facility: CLINIC | Age: 69
End: 2019-06-12

## 2019-06-12 RX ORDER — METHYLPREDNISOLONE 4 MG/1
TABLET ORAL
Qty: 1 KIT | Refills: 0 | Status: SHIPPED | OUTPATIENT
Start: 2019-06-12 | End: 2019-09-05

## 2019-06-12 NOTE — TELEPHONE ENCOUNTER
Patient aware that Medrol dose pack sent to pharmacy by Dr. Eve Lou patient to call office back if condition is worsening even after taking medication   Patient agreed.

## 2019-06-12 NOTE — TELEPHONE ENCOUNTER
Patient calling stating he seen Dr. Aysha Pablo on 06/10/19 for right elbow pain.    Patient was advised to elevate arm and ice right elbow by Dr. Aysha Pablo.   Patient also advised to take Ibuprofen per Dr. Aysha Pablo.   Patient states he has followed all instructio

## 2019-06-14 ENCOUNTER — TELEPHONE (OUTPATIENT)
Dept: INTERNAL MEDICINE CLINIC | Facility: CLINIC | Age: 69
End: 2019-06-14

## 2019-06-14 NOTE — TELEPHONE ENCOUNTER
**FYI** Pt wanted to advise Dr. Bond Bussing the meds below is working fine for the hand and his elbow is getting better and you if call you can leave a voicemail.     •  methylPREDNISolone (MEDROL) 4 MG Oral Tablet Therapy Pack, As directed., Disp: 1 kit, Rfl: 0

## 2019-06-26 RX ORDER — HYDROCHLOROTHIAZIDE 25 MG/1
TABLET ORAL
Qty: 90 TABLET | Refills: 1 | Status: SHIPPED | OUTPATIENT
Start: 2019-06-26 | End: 2019-12-19

## 2019-06-26 RX ORDER — METOPROLOL TARTRATE 100 MG/1
TABLET ORAL
Qty: 180 TABLET | Refills: 1 | Status: SHIPPED | OUTPATIENT
Start: 2019-06-26 | End: 2019-12-19

## 2019-06-26 RX ORDER — AMLODIPINE BESYLATE 10 MG/1
TABLET ORAL
Qty: 90 TABLET | Refills: 1 | Status: SHIPPED | OUTPATIENT
Start: 2019-06-26 | End: 2019-12-19

## 2019-06-26 NOTE — TELEPHONE ENCOUNTER
Refill passed per [facility] protocol.   Hypertensive Medications  Protocol Criteria:  · Appointment scheduled in the past 6 months or in the next 3 months  · BMP or CMP in the past 12 months  · Creatinine result < 2  Recent Outpatient Visits

## 2019-06-27 RX ORDER — ATORVASTATIN CALCIUM 40 MG/1
TABLET, FILM COATED ORAL
Qty: 90 TABLET | Refills: 1 | Status: SHIPPED | OUTPATIENT
Start: 2019-06-27 | End: 2019-12-19

## 2019-07-08 NOTE — TELEPHONE ENCOUNTER
New order entered for CT without contrast.    Please review; protocol failed. Requested Prescriptions     Pending Prescriptions Disp Refills   • ATORVASTATIN 40 MG Oral Tab [Pharmacy Med Name: Atorvastatin Calcium 40 Mg Tab Nort] 90 tablet 0     Sig: TAKE ONE TABLET BY MOUTH ONE TIME DAILY.      Sig

## 2019-07-16 ENCOUNTER — APPOINTMENT (OUTPATIENT)
Dept: ENDOCRINOLOGY | Facility: HOSPITAL | Age: 69
End: 2019-07-16
Attending: INTERNAL MEDICINE
Payer: MEDICARE

## 2019-07-30 ENCOUNTER — HOSPITAL ENCOUNTER (OUTPATIENT)
Dept: ENDOCRINOLOGY | Facility: HOSPITAL | Age: 69
Discharge: HOME | End: 2019-07-30
Attending: INTERNAL MEDICINE
Payer: MEDICARE

## 2019-07-30 VITALS — WEIGHT: 218.81 LBS | BODY MASS INDEX: 33 KG/M2

## 2019-07-30 NOTE — PROGRESS NOTES
Germain Velazquez  : 1950 attended individual initial assessment for Diabetes Education:    Date: 2019   Start time: 915 End time: 1015    HgbA1C (%)   Date Value   2019 7.6 (H)        Assessment:   Patient presents with referral from covered. Patient verbalized understanding and has no further questions at this time.     Tres Tello, RN

## 2019-09-05 ENCOUNTER — HOSPITAL ENCOUNTER (OUTPATIENT)
Dept: ENDOCRINOLOGY | Facility: HOSPITAL | Age: 69
Discharge: HOME | End: 2019-09-05
Attending: INTERNAL MEDICINE
Payer: MEDICARE

## 2019-09-05 VITALS — BODY MASS INDEX: 33 KG/M2 | WEIGHT: 219.63 LBS

## 2019-09-05 DIAGNOSIS — E11.9 TYPE 2 DIABETES MELLITUS WITHOUT COMPLICATION, WITHOUT LONG-TERM CURRENT USE OF INSULIN (HCC): Primary | ICD-10-CM

## 2019-09-05 NOTE — PROGRESS NOTES
Lacretia Market  [id number] attended Step 2 Group Diabetes Education Class:    Date: 9/5/2019  Start time: 1:00 pm End time: 3:00 pm    Wt: 219 lb 9.6 oz Weight change since start of program: + 0.8 lbs    Diabetes Overview, pathophysiology, pre-diabet understanding and has no further questions at this time.     Moiz Dunlap RN

## 2019-09-06 RX ORDER — METHYLPREDNISOLONE 4 MG/1
TABLET ORAL
Qty: 21 TABLET | Refills: 0 | Status: SHIPPED | OUTPATIENT
Start: 2019-09-06 | End: 2019-12-02 | Stop reason: ALTCHOICE

## 2019-09-06 NOTE — TELEPHONE ENCOUNTER
RN pls call pt and verify the need of rx refill, pls triage or offer ov if needed, thanks. Review pended refill request as it does not fall under a protocol.   Requested Prescriptions     Pending Prescriptions Disp Refills   • METHYLPREDNISOLONE 4 M

## 2019-09-11 ENCOUNTER — TELEPHONE (OUTPATIENT)
Dept: INTERNAL MEDICINE CLINIC | Facility: CLINIC | Age: 69
End: 2019-09-11

## 2019-09-11 DIAGNOSIS — E11.9 TYPE 2 DIABETES MELLITUS WITHOUT COMPLICATION, WITHOUT LONG-TERM CURRENT USE OF INSULIN (HCC): Primary | ICD-10-CM

## 2019-09-11 DIAGNOSIS — Z12.5 SCREENING FOR PROSTATE CANCER: ICD-10-CM

## 2019-09-11 NOTE — TELEPHONE ENCOUNTER
Pt was scheduled today 9/11 for a glucose level test but unfortunately, he ate breakfast this morning. He rescheduled for 10/16 and would like a glucose test order so he can get the test done before his scheduled appt.       Also, pt needs an order for gluc

## 2019-09-12 ENCOUNTER — TELEPHONE (OUTPATIENT)
Dept: INTERNAL MEDICINE CLINIC | Facility: CLINIC | Age: 69
End: 2019-09-12

## 2019-09-12 ENCOUNTER — HOSPITAL ENCOUNTER (OUTPATIENT)
Dept: ENDOCRINOLOGY | Facility: HOSPITAL | Age: 69
Discharge: HOME | End: 2019-09-12
Attending: INTERNAL MEDICINE
Payer: MEDICARE

## 2019-09-12 VITALS — BODY MASS INDEX: 33 KG/M2 | WEIGHT: 215.31 LBS

## 2019-09-12 DIAGNOSIS — E11.9 TYPE 2 DIABETES MELLITUS WITHOUT COMPLICATION, WITHOUT LONG-TERM CURRENT USE OF INSULIN (HCC): Primary | ICD-10-CM

## 2019-09-12 RX ORDER — LANCETS 30 GAUGE
EACH MISCELLANEOUS
Qty: 100 EACH | Refills: 3 | Status: SHIPPED | OUTPATIENT
Start: 2019-09-12 | End: 2020-01-01

## 2019-09-12 RX ORDER — GLUCOSAMINE HCL/CHONDROITIN SU 500-400 MG
CAPSULE ORAL
Qty: 100 STRIP | Refills: 3 | Status: SHIPPED | OUTPATIENT
Start: 2019-09-12 | End: 2021-01-01

## 2019-09-12 NOTE — TELEPHONE ENCOUNTER
Per pharm Rx for Glucose machine, test strips, and lancets needs to be re-written due to Medicare Part B Guidelines. Please include the ICD 10 Code, Insulin statement and actual name brand for machine needs to be on script.  One Touch Ultra is usually cover

## 2019-09-12 NOTE — TELEPHONE ENCOUNTER
Patient is due for complete set of blood tests. That order has been generated. Diagnosis of type 2 diabetes is relatively recent.   He should be checking his sugars once a day at most.  Okay to use what ever type of glucose monitoring system is covered un

## 2019-09-12 NOTE — PROGRESS NOTES
Raul Saw  [id number] attended Step 3 Group Diabetes Education Class:    Date: 9/12/2019  Start time: 1P End time: 3P    Wt: 215 lb 4.8 oz Weight change since start of program: -3.5#    No monitoring sugars.    Healthy Eating  Reviewed Basic Diet

## 2019-09-12 NOTE — TELEPHONE ENCOUNTER
Pt returning call and informed him of Dr Davis Antis message below. He verbalized understanding. [facility] has appt with Dr Kristina Cano  Next month (Oct 16).    He confirmed [facility] as preferred pharmacy pharmacy

## 2019-09-13 NOTE — TELEPHONE ENCOUNTER
Okay to generate order for requested diabetes supplies. Please refer to progress note from June 4, 2019 for diagnosis and recent history. Okay to give glucose test machine, strips, and lancets. Please take the ICD 10 code from the June 4 visit.   Patient

## 2019-09-18 ENCOUNTER — TELEPHONE (OUTPATIENT)
Dept: INTERNAL MEDICINE CLINIC | Facility: CLINIC | Age: 69
End: 2019-09-18

## 2019-09-18 NOTE — TELEPHONE ENCOUNTER
Dr Sneha Camargo has completed a retinal exam( two every year). He told me that he has sent this information to Rogelio Fraser on September 17,2019. Please confirm that this is sufficient information for your records.    Thank you   Lm Dietz

## 2019-09-18 NOTE — TELEPHONE ENCOUNTER
Diabetes Care Dilated Eye Exam      Comments:   this information has been sent from the office of Dr Laron Kolb to Dr. Sivakumar Grier on 9/17/2019     pls advise if pt needs referral.

## 2019-09-18 NOTE — TELEPHONE ENCOUNTER
Gary Muller called in from [place] called she stated she needs the visit notes from diabetes. She stated medication was sent over yesterday.   She stated it is needed for the insurance    Fax # [phone]

## 2019-09-19 ENCOUNTER — HOSPITAL ENCOUNTER (OUTPATIENT)
Dept: ENDOCRINOLOGY | Facility: HOSPITAL | Age: 69
Discharge: HOME | End: 2019-09-19
Attending: INTERNAL MEDICINE
Payer: MEDICARE

## 2019-09-19 VITALS — BODY MASS INDEX: 33 KG/M2 | WEIGHT: 217.38 LBS

## 2019-09-19 DIAGNOSIS — E11.9 TYPE 2 DIABETES MELLITUS WITHOUT COMPLICATION, WITHOUT LONG-TERM CURRENT USE OF INSULIN (HCC): Primary | ICD-10-CM

## 2019-09-19 NOTE — TELEPHONE ENCOUNTER
0408 Mike Roberto spoke to Aniket, informed me of written request from patients insurance for physician progress noted in order for medication coverage, informed pharmacist to fax request to PCP office [phone].

## 2019-09-20 NOTE — TELEPHONE ENCOUNTER
Please contact the patient. We do have access to the eye doctors note from his last visit. I have updated the patient's chart. It may take a day or so for the MyChart portion of the chart to be updated to reflect this.

## 2019-09-20 NOTE — TELEPHONE ENCOUNTER
Physician progress notes and signed order for diabetic supplies faxed to [facility] [phone], as requested.

## 2019-10-12 ENCOUNTER — LAB ENCOUNTER (OUTPATIENT)
Dept: LAB | Age: 69
End: 2019-10-12
Attending: INTERNAL MEDICINE
Payer: MEDICARE

## 2019-10-12 DIAGNOSIS — Z12.5 SCREENING FOR PROSTATE CANCER: ICD-10-CM

## 2019-10-12 DIAGNOSIS — E11.9 TYPE 2 DIABETES MELLITUS WITHOUT COMPLICATION, WITHOUT LONG-TERM CURRENT USE OF INSULIN (HCC): ICD-10-CM

## 2019-10-12 PROCEDURE — 80053 COMPREHEN METABOLIC PANEL: CPT

## 2019-10-12 PROCEDURE — 83036 HEMOGLOBIN GLYCOSYLATED A1C: CPT

## 2019-10-12 PROCEDURE — 84443 ASSAY THYROID STIM HORMONE: CPT

## 2019-10-12 PROCEDURE — 80061 LIPID PANEL: CPT

## 2019-10-12 PROCEDURE — 36415 COLL VENOUS BLD VENIPUNCTURE: CPT

## 2019-10-12 PROCEDURE — 82043 UR ALBUMIN QUANTITATIVE: CPT

## 2019-10-12 PROCEDURE — 82570 ASSAY OF URINE CREATININE: CPT

## 2019-10-12 PROCEDURE — 85025 COMPLETE CBC W/AUTO DIFF WBC: CPT

## 2019-10-16 ENCOUNTER — OFFICE VISIT (OUTPATIENT)
Dept: INTERNAL MEDICINE CLINIC | Facility: CLINIC | Age: 69
End: 2019-10-16
Payer: MEDICARE

## 2019-10-16 VITALS
WEIGHT: 213.38 LBS | RESPIRATION RATE: 20 BRPM | HEART RATE: 70 BPM | BODY MASS INDEX: 32.34 KG/M2 | DIASTOLIC BLOOD PRESSURE: 78 MMHG | HEIGHT: 68 IN | SYSTOLIC BLOOD PRESSURE: 150 MMHG | TEMPERATURE: 99 F

## 2019-10-16 DIAGNOSIS — Z85.46 HISTORY OF PROSTATE CANCER: Primary | ICD-10-CM

## 2019-10-16 DIAGNOSIS — E11.9 TYPE 2 DIABETES MELLITUS WITHOUT COMPLICATION, WITHOUT LONG-TERM CURRENT USE OF INSULIN (HCC): ICD-10-CM

## 2019-10-16 DIAGNOSIS — E78.5 HYPERLIPIDEMIA, UNSPECIFIED HYPERLIPIDEMIA TYPE: ICD-10-CM

## 2019-10-16 DIAGNOSIS — I10 HYPERTENSION, UNSPECIFIED TYPE: ICD-10-CM

## 2019-10-16 PROCEDURE — G0463 HOSPITAL OUTPT CLINIC VISIT: HCPCS | Performed by: INTERNAL MEDICINE

## 2019-10-16 PROCEDURE — 99213 OFFICE O/P EST LOW 20 MIN: CPT | Performed by: INTERNAL MEDICINE

## 2019-10-16 NOTE — PROGRESS NOTES
HPI:    Patient ID: Yehuda Pearson is a 68year old male. HPI  Patient here for follow-up on diabetes and recent blood work. Last seen here in June. New diagnosis of diabetes at that time. He has been to the diabetes education center.   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 , Disp: 90 tablet, Rfl: 1  METOPROLOL TARTRATE 100 MG Oral Tab, TAKE ONE TABLET BY MOUTH TWICE DAILY, Disp: 180 tablet, Rfl: 1  AMLODIPINE BESYLATE 10 MG Oral Tab, TAKE ONE TABLET BY MOUTH ONE TIME DAILY, Disp: possibility of starting metformin. He is very hesitant to do so. For now we will not start any medication. Work on diet and exercise. Follow-up in 3 months with blood work in appointment.  - GLUCOSE, SERUM; Future  - HEMOGLOBIN A1C; Future    3.  Hypert

## 2019-10-31 ENCOUNTER — HOSPITAL ENCOUNTER (OUTPATIENT)
Dept: ENDOCRINOLOGY | Facility: HOSPITAL | Age: 69
Discharge: HOME | End: 2019-10-31
Attending: INTERNAL MEDICINE
Payer: MEDICARE

## 2019-10-31 VITALS — WEIGHT: 221.5 LBS | BODY MASS INDEX: 34 KG/M2

## 2019-10-31 DIAGNOSIS — E11.9 TYPE 2 DIABETES MELLITUS WITHOUT COMPLICATION, WITHOUT LONG-TERM CURRENT USE OF INSULIN (HCC): Primary | ICD-10-CM

## 2019-10-31 NOTE — PROGRESS NOTES
Mccray Concha  [id number] attended Step 5 Group Diabetes Education Class:    Date: 10/31/2019  Start time: 1300  End time: 1500    Wt: 221 lb 8 oz          Weight change since start of program: 2.7# up    POC HbA1c: 7.3%% today  2 hr pp breakfast- 2
Universal Safety Interventions

## 2019-11-07 NOTE — ADDENDUM NOTE
Encounter addended by: Michelle Hubbard RD on: 11/7/2019 3:40 PM   Actions taken: OB Results Console updated

## 2019-11-17 NOTE — TELEPHONE ENCOUNTER
Review pended refill request as it does not fall under a protocol.     Last Rx: 9/6/19  LOV: 1 month ago

## 2019-11-18 ENCOUNTER — TELEPHONE (OUTPATIENT)
Dept: INTERNAL MEDICINE CLINIC | Facility: CLINIC | Age: 69
End: 2019-11-18

## 2019-11-18 ENCOUNTER — NURSE TRIAGE (OUTPATIENT)
Dept: INTERNAL MEDICINE CLINIC | Facility: CLINIC | Age: 69
End: 2019-11-18

## 2019-11-18 RX ORDER — METHYLPREDNISOLONE 4 MG/1
TABLET ORAL
Qty: 21 TABLET | Refills: 0 | OUTPATIENT
Start: 2019-11-18

## 2019-11-18 NOTE — TELEPHONE ENCOUNTER
Reason for Disposition  • MODERATE-SEVERE hives persist (i.e., hives interfere with normal activities or work) and taking antihistamine (e.g., Benadryl, Claritin) > 24 hours    Protocols used: HIVES-A-OH

## 2019-11-18 NOTE — TELEPHONE ENCOUNTER
lmtcb please ask pt why he is requesting this medication methylPREDNISolone 4 MG Oral Tablet Therapy  Came in through [facility]. See refill request from yesterday.   Ponce Hartman MD   to [facility] Rn Triage         2:11 PM   Note      Call pt and find ou

## 2019-11-18 NOTE — TELEPHONE ENCOUNTER
Left message to call back. Please transfer to triage. 1st attempt. I have also sent patient a Petsyt message to call us back.

## 2019-11-18 NOTE — TELEPHONE ENCOUNTER
Action Requested: Summary for Provider     []  Critical Lab, Recommendations Needed  [x] Need Additional Advice  []   FYI    []   Need Orders  [x] Need Medications Sent to Pharmacy  []  Other     SUMMARY: Dr Edy Reynolds, requesting rx for prednisone  c/o recen

## 2019-11-19 RX ORDER — PREDNISONE 20 MG/1
40 TABLET ORAL DAILY
Qty: 10 TABLET | Refills: 0 | Status: SHIPPED | OUTPATIENT
Start: 2019-11-19 | End: 2019-11-24

## 2019-11-19 NOTE — TELEPHONE ENCOUNTER
Call transferred by CSS: Apologized to pt for the phone call today as noted matter below was already discussed during yesterday's Triage encounter.

## 2019-11-19 NOTE — TELEPHONE ENCOUNTER
Spoke with patient (name and  verified), reviewed information, patient verbalized understanding and agrees with plan.   Advised patient to hold ibuprofen, aleve, advil and aspirin while taking prednisone, take prednisone with food

## 2019-12-02 ENCOUNTER — OFFICE VISIT (OUTPATIENT)
Dept: ALLERGY | Facility: CLINIC | Age: 69
End: 2019-12-02
Payer: MEDICARE

## 2019-12-02 VITALS
RESPIRATION RATE: 16 BRPM | HEIGHT: 69 IN | HEART RATE: 69 BPM | WEIGHT: 220 LBS | TEMPERATURE: 99 F | BODY MASS INDEX: 32.58 KG/M2 | DIASTOLIC BLOOD PRESSURE: 77 MMHG | SYSTOLIC BLOOD PRESSURE: 153 MMHG | OXYGEN SATURATION: 96 %

## 2019-12-02 DIAGNOSIS — L50.1 CHRONIC IDIOPATHIC URTICARIA: Primary | ICD-10-CM

## 2019-12-02 PROCEDURE — G0463 HOSPITAL OUTPT CLINIC VISIT: HCPCS | Performed by: ALLERGY & IMMUNOLOGY

## 2019-12-02 PROCEDURE — 99204 OFFICE O/P NEW MOD 45 MIN: CPT | Performed by: ALLERGY & IMMUNOLOGY

## 2019-12-02 RX ORDER — LEVOCETIRIZINE DIHYDROCHLORIDE 5 MG/1
5 TABLET, FILM COATED ORAL NIGHTLY
Qty: 30 TABLET | Refills: 0 | Status: SHIPPED | OUTPATIENT
Start: 2019-12-02 | End: 2020-01-01

## 2019-12-02 RX ORDER — PREDNISONE 10 MG/1
TABLET ORAL
Qty: 15 TABLET | Refills: 0 | Status: SHIPPED | OUTPATIENT
Start: 2019-12-02 | End: 2020-01-22

## 2019-12-02 NOTE — PATIENT INSTRUCTIONS
Recs:   Hx of suggest urticaria   Handouts on urticaria/angioedema  provided and reviewed including the potential of being idiopathic in nature.     Trial of xyzal 5 mg once a night at bedtime, Xyzal maybe titrated up to 2-4x/day if needed   Consider xolair

## 2019-12-02 NOTE — PROGRESS NOTES
Rodney Alvarez is a 68year old male. HPI:   Patient presents with:  Hives: Episodid hives for about 1 year. Patient is a 73-year-old male who presents for allergy evaluation upon referral of his PCP, Dr. Edgardo Jones with a chief complaint of hives. 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     Trial of xyzal 5 mg once a night at bedtime, Xyzal maybe titrated up to 2-4x/day if needed   Consider xolair if refractory    Prednisone course 30 mg x 5 days for pt to have at home in event of flare   Reviewed w

## 2019-12-19 RX ORDER — ATORVASTATIN CALCIUM 40 MG/1
TABLET, FILM COATED ORAL
Qty: 90 TABLET | Refills: 1 | Status: SHIPPED | OUTPATIENT
Start: 2019-12-19 | End: 2020-06-23

## 2019-12-19 RX ORDER — HYDROCHLOROTHIAZIDE 25 MG/1
TABLET ORAL
Qty: 90 TABLET | Refills: 1 | Status: ON HOLD | OUTPATIENT
Start: 2019-12-19 | End: 2020-01-01

## 2019-12-19 RX ORDER — AMLODIPINE BESYLATE 10 MG/1
TABLET ORAL
Qty: 90 TABLET | Refills: 1 | Status: SHIPPED | OUTPATIENT
Start: 2019-12-19 | End: 2020-06-23

## 2019-12-19 RX ORDER — METOPROLOL TARTRATE 100 MG/1
TABLET ORAL
Qty: 180 TABLET | Refills: 1 | Status: SHIPPED | OUTPATIENT
Start: 2019-12-19 | End: 2020-06-23

## 2019-12-19 NOTE — TELEPHONE ENCOUNTER
Hypertensive Medications  Protocol Criteria:  · Appointment scheduled in the past 6 months or in the next 3 months  · BMP or CMP in the past 12 months  · Creatinine result < 2  Recent Outpatient Visits            2 weeks ago Chronic idiopathic urticaria

## 2019-12-19 NOTE — TELEPHONE ENCOUNTER
Cholesterol Medications  Refill passed per [facility] protocol.     Protocol Criteria:  · Appointment scheduled in the past 12 months or in the next 3 months  · ALT & LDL on file in the past 12 months  · ALT result < 80  · LDL result <130   Recent Outp

## 2020-01-01 ENCOUNTER — APPOINTMENT (OUTPATIENT)
Dept: GENERAL RADIOLOGY | Facility: HOSPITAL | Age: 70
DRG: 234 | End: 2020-01-01
Attending: CLINICAL NURSE SPECIALIST
Payer: MEDICARE

## 2020-01-01 ENCOUNTER — CARDPULM VISIT (OUTPATIENT)
Dept: CARDIAC REHAB | Facility: HOSPITAL | Age: 70
End: 2020-01-01
Attending: INTERNAL MEDICINE
Payer: MEDICARE

## 2020-01-01 ENCOUNTER — APPOINTMENT (OUTPATIENT)
Dept: ULTRASOUND IMAGING | Facility: HOSPITAL | Age: 70
DRG: 234 | End: 2020-01-01
Attending: CLINICAL NURSE SPECIALIST
Payer: MEDICARE

## 2020-01-01 ENCOUNTER — LAB ENCOUNTER (OUTPATIENT)
Dept: LAB | Age: 70
End: 2020-01-01
Attending: UROLOGY
Payer: MEDICARE

## 2020-01-01 ENCOUNTER — OFFICE VISIT (OUTPATIENT)
Dept: INTERNAL MEDICINE CLINIC | Facility: CLINIC | Age: 70
End: 2020-01-01
Payer: MEDICARE

## 2020-01-01 ENCOUNTER — TELEPHONE (OUTPATIENT)
Dept: INTERNAL MEDICINE CLINIC | Facility: CLINIC | Age: 70
End: 2020-01-01

## 2020-01-01 ENCOUNTER — APPOINTMENT (OUTPATIENT)
Dept: CV DIAGNOSTICS | Facility: HOSPITAL | Age: 70
DRG: 234 | End: 2020-01-01
Attending: CLINICAL NURSE SPECIALIST
Payer: MEDICARE

## 2020-01-01 ENCOUNTER — OFFICE VISIT (OUTPATIENT)
Dept: CARDIOLOGY CLINIC | Facility: CLINIC | Age: 70
End: 2020-01-01
Payer: MEDICARE

## 2020-01-01 ENCOUNTER — APPOINTMENT (OUTPATIENT)
Dept: INTERVENTIONAL RADIOLOGY/VASCULAR | Facility: HOSPITAL | Age: 70
DRG: 234 | End: 2020-01-01
Attending: INTERNAL MEDICINE
Payer: MEDICARE

## 2020-01-01 ENCOUNTER — APPOINTMENT (OUTPATIENT)
Dept: GENERAL RADIOLOGY | Facility: HOSPITAL | Age: 70
DRG: 234 | End: 2020-01-01
Payer: MEDICARE

## 2020-01-01 ENCOUNTER — TELEPHONE (OUTPATIENT)
Dept: CARDIOLOGY CLINIC | Facility: CLINIC | Age: 70
End: 2020-01-01

## 2020-01-01 ENCOUNTER — OFFICE VISIT (OUTPATIENT)
Dept: SURGERY | Facility: CLINIC | Age: 70
End: 2020-01-01
Payer: MEDICARE

## 2020-01-01 ENCOUNTER — ANESTHESIA EVENT (OUTPATIENT)
Dept: SURGERY | Facility: HOSPITAL | Age: 70
DRG: 234 | End: 2020-01-01
Payer: MEDICARE

## 2020-01-01 ENCOUNTER — LAB ENCOUNTER (OUTPATIENT)
Dept: LAB | Age: 70
End: 2020-01-01
Attending: INTERNAL MEDICINE
Payer: MEDICARE

## 2020-01-01 ENCOUNTER — HOSPITAL ENCOUNTER (OUTPATIENT)
Dept: GENERAL RADIOLOGY | Facility: HOSPITAL | Age: 70
Discharge: HOME | End: 2020-01-01
Attending: INTERNAL MEDICINE
Payer: MEDICARE

## 2020-01-01 ENCOUNTER — PATIENT MESSAGE (OUTPATIENT)
Dept: CARDIOLOGY CLINIC | Facility: CLINIC | Age: 70
End: 2020-01-01

## 2020-01-01 ENCOUNTER — PATIENT OUTREACH (OUTPATIENT)
Dept: CASE MANAGEMENT | Age: 70
End: 2020-01-01

## 2020-01-01 ENCOUNTER — APPOINTMENT (OUTPATIENT)
Dept: LAB | Age: 70
End: 2020-01-01
Attending: UROLOGY
Payer: MEDICARE

## 2020-01-01 ENCOUNTER — APPOINTMENT (OUTPATIENT)
Dept: GENERAL RADIOLOGY | Facility: HOSPITAL | Age: 70
DRG: 234 | End: 2020-01-01
Attending: PHYSICIAN ASSISTANT
Payer: MEDICARE

## 2020-01-01 ENCOUNTER — NURSE TRIAGE (OUTPATIENT)
Dept: INTERNAL MEDICINE CLINIC | Facility: CLINIC | Age: 70
End: 2020-01-01

## 2020-01-01 ENCOUNTER — TELEPHONE (OUTPATIENT)
Dept: CASE MANAGEMENT | Facility: HOSPITAL | Age: 70
End: 2020-01-01

## 2020-01-01 ENCOUNTER — MED REC SCAN ONLY (OUTPATIENT)
Dept: INTERNAL MEDICINE CLINIC | Facility: CLINIC | Age: 70
End: 2020-01-01

## 2020-01-01 ENCOUNTER — TELEPHONE (OUTPATIENT)
Dept: CARDIAC SURGERY | Facility: HOSPITAL | Age: 70
End: 2020-01-01

## 2020-01-01 ENCOUNTER — HOSPITAL ENCOUNTER (INPATIENT)
Facility: HOSPITAL | Age: 70
LOS: 5 days | Discharge: HOME HEALTH | DRG: 234 | End: 2020-01-01
Attending: EMERGENCY MEDICINE | Admitting: HOSPITALIST
Payer: MEDICARE

## 2020-01-01 ENCOUNTER — LAB ENCOUNTER (OUTPATIENT)
Dept: LAB | Facility: HOSPITAL | Age: 70
End: 2020-01-01
Attending: INTERNAL MEDICINE
Payer: MEDICARE

## 2020-01-01 ENCOUNTER — PATIENT MESSAGE (OUTPATIENT)
Dept: SURGERY | Facility: CLINIC | Age: 70
End: 2020-01-01

## 2020-01-01 ENCOUNTER — ANESTHESIA (OUTPATIENT)
Dept: SURGERY | Facility: HOSPITAL | Age: 70
DRG: 234 | End: 2020-01-01
Payer: MEDICARE

## 2020-01-01 VITALS
WEIGHT: 209 LBS | HEART RATE: 82 BPM | BODY MASS INDEX: 30.96 KG/M2 | DIASTOLIC BLOOD PRESSURE: 74 MMHG | HEIGHT: 69 IN | SYSTOLIC BLOOD PRESSURE: 148 MMHG

## 2020-01-01 VITALS
HEIGHT: 69 IN | HEART RATE: 60 BPM | SYSTOLIC BLOOD PRESSURE: 140 MMHG | WEIGHT: 211 LBS | BODY MASS INDEX: 31.25 KG/M2 | DIASTOLIC BLOOD PRESSURE: 90 MMHG | RESPIRATION RATE: 18 BRPM

## 2020-01-01 VITALS
DIASTOLIC BLOOD PRESSURE: 74 MMHG | BODY MASS INDEX: 30.81 KG/M2 | HEIGHT: 69 IN | SYSTOLIC BLOOD PRESSURE: 134 MMHG | WEIGHT: 208 LBS | HEART RATE: 65 BPM

## 2020-01-01 VITALS
TEMPERATURE: 98 F | DIASTOLIC BLOOD PRESSURE: 103 MMHG | HEIGHT: 69 IN | SYSTOLIC BLOOD PRESSURE: 159 MMHG | OXYGEN SATURATION: 95 % | HEART RATE: 75 BPM | WEIGHT: 209.31 LBS | BODY MASS INDEX: 31 KG/M2 | RESPIRATION RATE: 21 BRPM

## 2020-01-01 VITALS
WEIGHT: 212 LBS | SYSTOLIC BLOOD PRESSURE: 150 MMHG | HEIGHT: 69 IN | HEART RATE: 67 BPM | DIASTOLIC BLOOD PRESSURE: 78 MMHG | BODY MASS INDEX: 31.4 KG/M2

## 2020-01-01 VITALS — DIASTOLIC BLOOD PRESSURE: 82 MMHG | WEIGHT: 211 LBS | BODY MASS INDEX: 31 KG/M2 | SYSTOLIC BLOOD PRESSURE: 144 MMHG

## 2020-01-01 VITALS
WEIGHT: 210 LBS | RESPIRATION RATE: 20 BRPM | HEART RATE: 74 BPM | DIASTOLIC BLOOD PRESSURE: 80 MMHG | HEIGHT: 69 IN | BODY MASS INDEX: 31.1 KG/M2 | SYSTOLIC BLOOD PRESSURE: 138 MMHG

## 2020-01-01 DIAGNOSIS — I25.10 CORONARY ARTERY DISEASE INVOLVING NATIVE CORONARY ARTERY OF NATIVE HEART WITHOUT ANGINA PECTORIS: Primary | ICD-10-CM

## 2020-01-01 DIAGNOSIS — I10 ESSENTIAL HYPERTENSION: ICD-10-CM

## 2020-01-01 DIAGNOSIS — Z23 NEED FOR VACCINATION: ICD-10-CM

## 2020-01-01 DIAGNOSIS — Z79.818 ANDROGEN DEPRIVATION THERAPY: Primary | ICD-10-CM

## 2020-01-01 DIAGNOSIS — E78.49 OTHER HYPERLIPIDEMIA: ICD-10-CM

## 2020-01-01 DIAGNOSIS — Z79.818 ANDROGEN DEPRIVATION THERAPY: ICD-10-CM

## 2020-01-01 DIAGNOSIS — R07.89 CHEST WALL DISCOMFORT: ICD-10-CM

## 2020-01-01 DIAGNOSIS — R07.9 CHEST PAIN, UNSPECIFIED TYPE: Primary | ICD-10-CM

## 2020-01-01 DIAGNOSIS — I10 ESSENTIAL HYPERTENSION: Primary | ICD-10-CM

## 2020-01-01 DIAGNOSIS — R97.21 RISING PSA FOLLOWING TREATMENT FOR MALIGNANT NEOPLASM OF PROSTATE: ICD-10-CM

## 2020-01-01 DIAGNOSIS — I25.10 ATHEROSCLEROSIS OF NATIVE CORONARY ARTERY OF NATIVE HEART WITHOUT ANGINA PECTORIS: Primary | ICD-10-CM

## 2020-01-01 DIAGNOSIS — I25.10 CORONARY ARTERY DISEASE INVOLVING NATIVE CORONARY ARTERY OF NATIVE HEART WITHOUT ANGINA PECTORIS: ICD-10-CM

## 2020-01-01 DIAGNOSIS — R07.89 CHEST WALL DISCOMFORT: Primary | ICD-10-CM

## 2020-01-01 DIAGNOSIS — C61 MALIGNANT NEOPLASM OF PROSTATE (HCC): ICD-10-CM

## 2020-01-01 DIAGNOSIS — R39.9 LOWER URINARY TRACT SYMPTOMS (LUTS): ICD-10-CM

## 2020-01-01 DIAGNOSIS — E11.9 TYPE 2 DIABETES MELLITUS WITHOUT COMPLICATION, UNSPECIFIED WHETHER LONG TERM INSULIN USE (HCC): ICD-10-CM

## 2020-01-01 DIAGNOSIS — Z02.9 ENCOUNTERS FOR ADMINISTRATIVE PURPOSE: ICD-10-CM

## 2020-01-01 DIAGNOSIS — I25.119 ATHEROSCLEROSIS OF NATIVE CORONARY ARTERY OF NATIVE HEART WITH ANGINA PECTORIS (HCC): ICD-10-CM

## 2020-01-01 DIAGNOSIS — E78.5 HYPERLIPIDEMIA, UNSPECIFIED HYPERLIPIDEMIA TYPE: ICD-10-CM

## 2020-01-01 LAB
ALBUMIN SERPL-MCNC: 3.8 G/DL (ref 3.4–5)
ALP LIVER SERPL-CCNC: 92 U/L (ref 45–117)
ALT SERPL-CCNC: 32 U/L (ref 16–61)
ANION GAP SERPL CALC-SCNC: 1 MMOL/L (ref 0–18)
ANION GAP SERPL CALC-SCNC: 3 MMOL/L (ref 0–18)
ANION GAP SERPL CALC-SCNC: 5 MMOL/L (ref 0–18)
ANION GAP SERPL CALC-SCNC: 6 MMOL/L (ref 0–18)
ANION GAP SERPL CALC-SCNC: 7 MMOL/L (ref 0–18)
ANION GAP SERPL CALC-SCNC: 7 MMOL/L (ref 0–18)
ANION GAP SERPL CALC-SCNC: 9 MMOL/L (ref 0–18)
ANION GAP SERPL CALC-SCNC: 9 MMOL/L (ref 0–18)
ANTIBODY SCREEN: NEGATIVE
APTT PPP: 25.1 SECONDS (ref 23.2–35.3)
APTT PPP: 27.4 SECONDS (ref 23.2–35.3)
AST SERPL-CCNC: 19 U/L (ref 15–37)
BASE EXCESS BLD CALC-SCNC: -1.5 MMOL/L (ref ?–2)
BASE EXCESS BLD CALC-SCNC: -2.7 MMOL/L (ref ?–2)
BASOPHILS # BLD AUTO: 0 X10(3) UL (ref 0–0.2)
BASOPHILS # BLD AUTO: 0.01 X10(3) UL (ref 0–0.2)
BASOPHILS # BLD AUTO: 0.01 X10(3) UL (ref 0–0.2)
BASOPHILS NFR BLD AUTO: 0 %
BASOPHILS NFR BLD AUTO: 0.1 %
BASOPHILS NFR BLD AUTO: 0.1 %
BILIRUB DIRECT SERPL-MCNC: 0.1 MG/DL (ref 0–0.2)
BILIRUB SERPL-MCNC: 0.6 MG/DL (ref 0.1–2)
BILIRUB UR QL: NEGATIVE
BLOOD TYPE BARCODE: 5100
BUN BLD-MCNC: 11 MG/DL (ref 7–18)
BUN BLD-MCNC: 13 MG/DL (ref 7–18)
BUN BLD-MCNC: 14 MG/DL (ref 7–18)
BUN BLD-MCNC: 14 MG/DL (ref 7–18)
BUN BLD-MCNC: 16 MG/DL (ref 7–18)
BUN BLD-MCNC: 17 MG/DL (ref 7–18)
BUN/CREAT SERPL: 16.9 (ref 10–20)
BUN/CREAT SERPL: 17.7 (ref 10–20)
BUN/CREAT SERPL: 20.5 (ref 10–20)
BUN/CREAT SERPL: 22.4 (ref 10–20)
BUN/CREAT SERPL: 22.5 (ref 10–20)
BUN/CREAT SERPL: 22.6 (ref 10–20)
BUN/CREAT SERPL: 23.9 (ref 10–20)
BUN/CREAT SERPL: 31.5 (ref 10–20)
CA-I BLD-SCNC: 1.31 MMOL/L (ref 0.95–1.32)
CALCIUM BLD-MCNC: 10.1 MG/DL (ref 8.5–10.1)
CALCIUM BLD-MCNC: 8.5 MG/DL (ref 8.5–10.1)
CALCIUM BLD-MCNC: 8.7 MG/DL (ref 8.5–10.1)
CALCIUM BLD-MCNC: 8.7 MG/DL (ref 8.5–10.1)
CALCIUM BLD-MCNC: 8.8 MG/DL (ref 8.5–10.1)
CALCIUM BLD-MCNC: 9.4 MG/DL (ref 8.5–10.1)
CALCIUM BLD-MCNC: 9.7 MG/DL (ref 8.5–10.1)
CALCIUM BLD-MCNC: 9.9 MG/DL (ref 8.5–10.1)
CHLORIDE SERPL-SCNC: 104 MMOL/L (ref 98–112)
CHLORIDE SERPL-SCNC: 105 MMOL/L (ref 98–112)
CHLORIDE SERPL-SCNC: 107 MMOL/L (ref 98–112)
CHLORIDE SERPL-SCNC: 108 MMOL/L (ref 98–112)
CHLORIDE SERPL-SCNC: 109 MMOL/L (ref 98–112)
CHLORIDE SERPL-SCNC: 109 MMOL/L (ref 98–112)
CHLORIDE SERPL-SCNC: 113 MMOL/L (ref 98–112)
CHLORIDE SERPL-SCNC: 115 MMOL/L (ref 98–112)
CHOLEST SMN-MCNC: 171 MG/DL (ref ?–200)
CLARITY UR: CLEAR
CO2 SERPL-SCNC: 24 MMOL/L (ref 21–32)
CO2 SERPL-SCNC: 24 MMOL/L (ref 21–32)
CO2 SERPL-SCNC: 27 MMOL/L (ref 21–32)
CO2 SERPL-SCNC: 28 MMOL/L (ref 21–32)
CO2 SERPL-SCNC: 29 MMOL/L (ref 21–32)
CO2 SERPL-SCNC: 29 MMOL/L (ref 21–32)
CO2 SERPL-SCNC: 32 MMOL/L (ref 21–32)
CO2 SERPL-SCNC: 33 MMOL/L (ref 21–32)
COHGB MFR BLD: 2.2 % (ref 0–1.5)
COLOR UR: COLORLESS
CREAT BLD-MCNC: 0.54 MG/DL (ref 0.7–1.3)
CREAT BLD-MCNC: 0.58 MG/DL (ref 0.7–1.3)
CREAT BLD-MCNC: 0.62 MG/DL (ref 0.7–1.3)
CREAT BLD-MCNC: 0.65 MG/DL (ref 0.7–1.3)
CREAT BLD-MCNC: 0.67 MG/DL (ref 0.7–1.3)
CREAT BLD-MCNC: 0.71 MG/DL (ref 0.7–1.3)
CREAT BLD-MCNC: 0.78 MG/DL (ref 0.7–1.3)
CREAT BLD-MCNC: 0.79 MG/DL (ref 0.7–1.3)
DEPRECATED RDW RBC AUTO: 43.9 FL (ref 35.1–46.3)
DEPRECATED RDW RBC AUTO: 44.3 FL (ref 35.1–46.3)
DEPRECATED RDW RBC AUTO: 45.1 FL (ref 35.1–46.3)
DEPRECATED RDW RBC AUTO: 45.5 FL (ref 35.1–46.3)
DEPRECATED RDW RBC AUTO: 47.7 FL (ref 35.1–46.3)
DEPRECATED RDW RBC AUTO: 49.5 FL (ref 35.1–46.3)
DEPRECATED RDW RBC AUTO: 51.1 FL (ref 35.1–46.3)
EOSINOPHIL # BLD AUTO: 0 X10(3) UL (ref 0–0.7)
EOSINOPHIL # BLD AUTO: 0.22 X10(3) UL (ref 0–0.7)
EOSINOPHIL # BLD AUTO: 0.27 X10(3) UL (ref 0–0.7)
EOSINOPHIL NFR BLD AUTO: 0 %
EOSINOPHIL NFR BLD AUTO: 3.1 %
EOSINOPHIL NFR BLD AUTO: 4 %
ERYTHROCYTE [DISTWIDTH] IN BLOOD BY AUTOMATED COUNT: 14.4 % (ref 11–15)
ERYTHROCYTE [DISTWIDTH] IN BLOOD BY AUTOMATED COUNT: 14.4 % (ref 11–15)
ERYTHROCYTE [DISTWIDTH] IN BLOOD BY AUTOMATED COUNT: 14.5 % (ref 11–15)
ERYTHROCYTE [DISTWIDTH] IN BLOOD BY AUTOMATED COUNT: 14.6 % (ref 11–15)
ERYTHROCYTE [DISTWIDTH] IN BLOOD BY AUTOMATED COUNT: 14.8 % (ref 11–15)
ERYTHROCYTE [DISTWIDTH] IN BLOOD BY AUTOMATED COUNT: 15.2 % (ref 11–15)
ERYTHROCYTE [DISTWIDTH] IN BLOOD BY AUTOMATED COUNT: 15.8 % (ref 11–15)
EST. AVERAGE GLUCOSE BLD GHB EST-MCNC: 148 MG/DL (ref 68–126)
FIBRINOGEN PPP-MCNC: 254 MG/DL (ref 176–491)
GLUCOSE BLD-MCNC: 109 MG/DL (ref 70–99)
GLUCOSE BLD-MCNC: 111 MG/DL (ref 70–99)
GLUCOSE BLD-MCNC: 114 MG/DL (ref 70–99)
GLUCOSE BLD-MCNC: 122 MG/DL (ref 70–99)
GLUCOSE BLD-MCNC: 124 MG/DL (ref 70–99)
GLUCOSE BLD-MCNC: 124 MG/DL (ref 70–99)
GLUCOSE BLD-MCNC: 127 MG/DL (ref 70–99)
GLUCOSE BLD-MCNC: 132 MG/DL (ref 70–99)
GLUCOSE BLD-MCNC: 137 MG/DL (ref 70–99)
GLUCOSE BLD-MCNC: 144 MG/DL (ref 70–99)
GLUCOSE BLD-MCNC: 202 MG/DL (ref 70–99)
GLUCOSE BLD-MCNC: 211 MG/DL (ref 70–99)
GLUCOSE BLDC GLUCOMTR-MCNC: 103 MG/DL (ref 70–99)
GLUCOSE BLDC GLUCOMTR-MCNC: 109 MG/DL (ref 70–99)
GLUCOSE BLDC GLUCOMTR-MCNC: 110 MG/DL (ref 70–99)
GLUCOSE BLDC GLUCOMTR-MCNC: 112 MG/DL (ref 70–99)
GLUCOSE BLDC GLUCOMTR-MCNC: 114 MG/DL (ref 70–99)
GLUCOSE BLDC GLUCOMTR-MCNC: 114 MG/DL (ref 70–99)
GLUCOSE BLDC GLUCOMTR-MCNC: 115 MG/DL (ref 70–99)
GLUCOSE BLDC GLUCOMTR-MCNC: 116 MG/DL (ref 70–99)
GLUCOSE BLDC GLUCOMTR-MCNC: 116 MG/DL (ref 70–99)
GLUCOSE BLDC GLUCOMTR-MCNC: 118 MG/DL (ref 70–99)
GLUCOSE BLDC GLUCOMTR-MCNC: 119 MG/DL (ref 70–99)
GLUCOSE BLDC GLUCOMTR-MCNC: 124 MG/DL (ref 70–99)
GLUCOSE BLDC GLUCOMTR-MCNC: 126 MG/DL (ref 70–99)
GLUCOSE BLDC GLUCOMTR-MCNC: 126 MG/DL (ref 70–99)
GLUCOSE BLDC GLUCOMTR-MCNC: 127 MG/DL (ref 70–99)
GLUCOSE BLDC GLUCOMTR-MCNC: 127 MG/DL (ref 70–99)
GLUCOSE BLDC GLUCOMTR-MCNC: 128 MG/DL (ref 70–99)
GLUCOSE BLDC GLUCOMTR-MCNC: 130 MG/DL (ref 70–99)
GLUCOSE BLDC GLUCOMTR-MCNC: 134 MG/DL (ref 70–99)
GLUCOSE BLDC GLUCOMTR-MCNC: 134 MG/DL (ref 70–99)
GLUCOSE BLDC GLUCOMTR-MCNC: 136 MG/DL (ref 70–99)
GLUCOSE BLDC GLUCOMTR-MCNC: 138 MG/DL (ref 70–99)
GLUCOSE BLDC GLUCOMTR-MCNC: 139 MG/DL (ref 70–99)
GLUCOSE BLDC GLUCOMTR-MCNC: 147 MG/DL (ref 70–99)
GLUCOSE BLDC GLUCOMTR-MCNC: 147 MG/DL (ref 70–99)
GLUCOSE BLDC GLUCOMTR-MCNC: 152 MG/DL (ref 70–99)
GLUCOSE BLDC GLUCOMTR-MCNC: 154 MG/DL (ref 70–99)
GLUCOSE BLDC GLUCOMTR-MCNC: 155 MG/DL (ref 70–99)
GLUCOSE BLDC GLUCOMTR-MCNC: 158 MG/DL (ref 70–99)
GLUCOSE BLDC GLUCOMTR-MCNC: 160 MG/DL (ref 70–99)
GLUCOSE BLDC GLUCOMTR-MCNC: 161 MG/DL (ref 70–99)
GLUCOSE BLDC GLUCOMTR-MCNC: 162 MG/DL (ref 70–99)
GLUCOSE BLDC GLUCOMTR-MCNC: 164 MG/DL (ref 70–99)
GLUCOSE BLDC GLUCOMTR-MCNC: 166 MG/DL (ref 70–99)
GLUCOSE BLDC GLUCOMTR-MCNC: 179 MG/DL (ref 70–99)
GLUCOSE BLDC GLUCOMTR-MCNC: 180 MG/DL (ref 70–99)
GLUCOSE BLDC GLUCOMTR-MCNC: 203 MG/DL (ref 70–99)
GLUCOSE BLDC GLUCOMTR-MCNC: 233 MG/DL (ref 70–99)
GLUCOSE UR-MCNC: NEGATIVE MG/DL
HAV IGM SER QL: 2.2 MG/DL (ref 1.6–2.6)
HAV IGM SER QL: 2.9 MG/DL (ref 1.6–2.6)
HBA1C MFR BLD HPLC: 6.8 % (ref ?–5.7)
HCO3 BLDA-SCNC: 22.7 MEQ/L (ref 21–27)
HCO3 BLDA-SCNC: 23.6 MEQ/L (ref 21–27)
HCT VFR BLD AUTO: 28.2 % (ref 39–53)
HCT VFR BLD AUTO: 31 % (ref 39–53)
HCT VFR BLD AUTO: 31 % (ref 39–53)
HCT VFR BLD AUTO: 31.8 % (ref 39–53)
HCT VFR BLD AUTO: 35.3 % (ref 39–53)
HCT VFR BLD AUTO: 37.6 % (ref 39–53)
HCT VFR BLD AUTO: 38.2 % (ref 39–53)
HDLC SERPL-MCNC: 39 MG/DL (ref 40–59)
HGB BLD-MCNC: 10.3 G/DL (ref 13–17.5)
HGB BLD-MCNC: 10.8 G/DL (ref 13–17.5)
HGB BLD-MCNC: 11.1 G/DL (ref 13–17.5)
HGB BLD-MCNC: 11.8 G/DL (ref 13.5–17.5)
HGB BLD-MCNC: 11.9 G/DL (ref 13–17.5)
HGB BLD-MCNC: 12.9 G/DL (ref 13–17.5)
HGB BLD-MCNC: 13.6 G/DL (ref 13–17.5)
HGB BLD-MCNC: 9.3 G/DL (ref 13–17.5)
HGB BLD-MCNC: 9.6 G/DL (ref 13–17.5)
HGB UR QL STRIP.AUTO: NEGATIVE
IMM GRANULOCYTES # BLD AUTO: 0.02 X10(3) UL (ref 0–1)
IMM GRANULOCYTES # BLD AUTO: 0.03 X10(3) UL (ref 0–1)
IMM GRANULOCYTES # BLD AUTO: 0.06 X10(3) UL (ref 0–1)
IMM GRANULOCYTES NFR BLD: 0.3 %
IMM GRANULOCYTES NFR BLD: 0.4 %
IMM GRANULOCYTES NFR BLD: 0.6 %
INR BLD: 1.08 (ref 0.9–1.2)
INR BLD: 1.35 (ref 0.9–1.2)
ISTAT ACTIVATED CLOTTING TIME: 131 SECONDS (ref 125–137)
ISTAT ACTIVATED CLOTTING TIME: 323 SECONDS (ref 125–137)
ISTAT ACTIVATED CLOTTING TIME: 450 SECONDS (ref 125–137)
ISTAT ACTIVATED CLOTTING TIME: 610 SECONDS (ref 125–137)
ISTAT ACTIVATED CLOTTING TIME: <125 SECONDS (ref 125–137)
ISTAT BLOOD GAS BASE EXCESS: -1 MMOL/L (ref ?–30)
ISTAT BLOOD GAS BASE EXCESS: -1 MMOL/L (ref ?–30)
ISTAT BLOOD GAS BASE EXCESS: -2 MMOL/L (ref ?–30)
ISTAT BLOOD GAS BASE EXCESS: 3 MMOL/L (ref ?–30)
ISTAT BLOOD GAS HCO3: 24.1 MEQ/L (ref 22–26)
ISTAT BLOOD GAS HCO3: 24.1 MEQ/L (ref 22–26)
ISTAT BLOOD GAS HCO3: 24.5 MEQ/L (ref 22–26)
ISTAT BLOOD GAS HCO3: 26.6 MEQ/L (ref 22–26)
ISTAT BLOOD GAS O2 SATURATION: 100 % (ref 92–100)
ISTAT BLOOD GAS O2 SATURATION: 99 % (ref 92–100)
ISTAT BLOOD GAS PCO2: 38.7 MMHG (ref 35–45)
ISTAT BLOOD GAS PCO2: 40.8 MMHG (ref 35–45)
ISTAT BLOOD GAS PCO2: 45.1 MMHG (ref 35–45)
ISTAT BLOOD GAS PCO2: 48.8 MMHG (ref 35–45)
ISTAT BLOOD GAS PH: 7.3 (ref 7.35–7.45)
ISTAT BLOOD GAS PH: 7.34 (ref 7.35–7.45)
ISTAT BLOOD GAS PH: 7.38 (ref 7.35–7.45)
ISTAT BLOOD GAS PH: 7.45 (ref 7.35–7.45)
ISTAT BLOOD GAS PO2: 181 MMHG (ref 80–105)
ISTAT BLOOD GAS PO2: 203 MMHG (ref 80–105)
ISTAT BLOOD GAS PO2: 302 MMHG (ref 80–105)
ISTAT BLOOD GAS PO2: 343 MMHG (ref 80–105)
ISTAT BLOOD GAS TCO2: 25 MMOL/L (ref 22–32)
ISTAT BLOOD GAS TCO2: 26 MMOL/L (ref 22–32)
ISTAT BLOOD GAS TCO2: 26 MMOL/L (ref 22–32)
ISTAT BLOOD GAS TCO2: 28 MMOL/L (ref 22–32)
ISTAT HEMATOCRIT: 23 %
ISTAT HEMATOCRIT: 25 %
ISTAT HEMATOCRIT: 26 %
ISTAT HEMATOCRIT: 33 %
ISTAT IONIZED CALCIUM: 1.2 MMOL/L (ref 1.12–1.32)
ISTAT IONIZED CALCIUM: 1.23 MMOL/L (ref 1.12–1.32)
ISTAT IONIZED CALCIUM: 1.26 MMOL/L (ref 1.12–1.32)
ISTAT IONIZED CALCIUM: 1.37 MMOL/L (ref 1.12–1.32)
ISTAT POTASSIUM: 3.3 MMOL/L (ref 3.6–5.1)
ISTAT POTASSIUM: 3.6 MMOL/L (ref 3.6–5.1)
ISTAT POTASSIUM: 3.7 MMOL/L (ref 3.6–5.1)
ISTAT POTASSIUM: 4.5 MMOL/L (ref 3.6–5.1)
ISTAT SODIUM: 138 MMOL/L (ref 136–145)
ISTAT SODIUM: 140 MMOL/L (ref 136–145)
ISTAT SODIUM: 141 MMOL/L (ref 136–145)
ISTAT SODIUM: 142 MMOL/L (ref 136–145)
KETONES UR-MCNC: NEGATIVE MG/DL
LACTIC ACID (BLOOD GAS): 2.7 MMOL/L (ref 0.5–2.2)
LDLC SERPL DIRECT ASSAY-MCNC: 64 MG/DL (ref ?–100)
LEUKOCYTE ESTERASE UR QL STRIP.AUTO: NEGATIVE
LYMPHOCYTES # BLD AUTO: 0.66 X10(3) UL (ref 1–4)
LYMPHOCYTES # BLD AUTO: 1.02 X10(3) UL (ref 1–4)
LYMPHOCYTES # BLD AUTO: 1.18 X10(3) UL (ref 1–4)
LYMPHOCYTES NFR BLD AUTO: 14.2 %
LYMPHOCYTES NFR BLD AUTO: 17.6 %
LYMPHOCYTES NFR BLD AUTO: 6.5 %
M PROTEIN MFR SERPL ELPH: 7.4 G/DL (ref 6.4–8.2)
MCH RBC QN AUTO: 29.4 PG (ref 26–34)
MCH RBC QN AUTO: 29.5 PG (ref 26–34)
MCH RBC QN AUTO: 30 PG (ref 26–34)
MCH RBC QN AUTO: 30.1 PG (ref 26–34)
MCH RBC QN AUTO: 30.1 PG (ref 26–34)
MCH RBC QN AUTO: 30.2 PG (ref 26–34)
MCH RBC QN AUTO: 30.2 PG (ref 26–34)
MCHC RBC AUTO-ENTMCNC: 33 G/DL (ref 31–37)
MCHC RBC AUTO-ENTMCNC: 33.2 G/DL (ref 31–37)
MCHC RBC AUTO-ENTMCNC: 33.7 G/DL (ref 31–37)
MCHC RBC AUTO-ENTMCNC: 34.3 G/DL (ref 31–37)
MCHC RBC AUTO-ENTMCNC: 34.8 G/DL (ref 31–37)
MCHC RBC AUTO-ENTMCNC: 34.9 G/DL (ref 31–37)
MCHC RBC AUTO-ENTMCNC: 35.6 G/DL (ref 31–37)
MCV RBC AUTO: 84.9 FL (ref 80–100)
MCV RBC AUTO: 85.6 FL (ref 80–100)
MCV RBC AUTO: 86.4 FL (ref 80–100)
MCV RBC AUTO: 86.4 FL (ref 80–100)
MCV RBC AUTO: 89.1 FL (ref 80–100)
MCV RBC AUTO: 89.5 FL (ref 80–100)
MCV RBC AUTO: 90.4 FL (ref 80–100)
METHGB MFR BLD: 1 % SAT (ref 0.4–1.5)
MONOCYTES # BLD AUTO: 0.22 X10(3) UL (ref 0.1–1)
MONOCYTES # BLD AUTO: 0.39 X10(3) UL (ref 0.1–1)
MONOCYTES # BLD AUTO: 0.41 X10(3) UL (ref 0.1–1)
MONOCYTES NFR BLD AUTO: 2.2 %
MONOCYTES NFR BLD AUTO: 5.7 %
MONOCYTES NFR BLD AUTO: 5.8 %
MRSA DNA SPEC QL NAA+PROBE: NEGATIVE
NEUTROPHILS # BLD AUTO: 4.81 X10 (3) UL (ref 1.5–7.7)
NEUTROPHILS # BLD AUTO: 4.81 X10(3) UL (ref 1.5–7.7)
NEUTROPHILS # BLD AUTO: 5.53 X10 (3) UL (ref 1.5–7.7)
NEUTROPHILS # BLD AUTO: 5.53 X10(3) UL (ref 1.5–7.7)
NEUTROPHILS # BLD AUTO: 9.26 X10 (3) UL (ref 1.5–7.7)
NEUTROPHILS # BLD AUTO: 9.26 X10(3) UL (ref 1.5–7.7)
NEUTROPHILS NFR BLD AUTO: 72.1 %
NEUTROPHILS NFR BLD AUTO: 76.7 %
NEUTROPHILS NFR BLD AUTO: 90.6 %
NITRITE UR QL STRIP.AUTO: NEGATIVE
NONHDLC SERPL-MCNC: 132 MG/DL (ref ?–130)
O2 CT BLD-SCNC: 15 VOL% (ref 15–23)
O2 CT BLD-SCNC: 15.9 VOL% (ref 15–23)
O2/TOTAL GAS SETTING VFR VENT: 40 %
O2/TOTAL GAS SETTING VFR VENT: 50 %
OSMOLALITY SERPL CALC.SUM OF ELEC: 295 MOSM/KG (ref 275–295)
OSMOLALITY SERPL CALC.SUM OF ELEC: 296 MOSM/KG (ref 275–295)
OSMOLALITY SERPL CALC.SUM OF ELEC: 297 MOSM/KG (ref 275–295)
OSMOLALITY SERPL CALC.SUM OF ELEC: 298 MOSM/KG (ref 275–295)
OSMOLALITY SERPL CALC.SUM OF ELEC: 299 MOSM/KG (ref 275–295)
OSMOLALITY SERPL CALC.SUM OF ELEC: 299 MOSM/KG (ref 275–295)
OSMOLALITY SERPL CALC.SUM OF ELEC: 303 MOSM/KG (ref 275–295)
OSMOLALITY SERPL CALC.SUM OF ELEC: 305 MOSM/KG (ref 275–295)
PCO2 BLDA: 36 MM HG (ref 35–45)
PCO2 BLDA: 41 MM HG (ref 35–45)
PEEP SETTING VENT: 5 CM H2O
PEEP SETTING VENT: 5 CM H2O
PH BLDA: 7.37 [PH] (ref 7.35–7.45)
PH BLDA: 7.39 [PH] (ref 7.35–7.45)
PH UR: 6 [PH] (ref 5–8)
PLATELET # BLD AUTO: 101 10(3)UL (ref 150–450)
PLATELET # BLD AUTO: 120 10(3)UL (ref 150–450)
PLATELET # BLD AUTO: 131 10(3)UL (ref 150–450)
PLATELET # BLD AUTO: 137 10(3)UL (ref 150–450)
PLATELET # BLD AUTO: 151 10(3)UL (ref 150–450)
PLATELET # BLD AUTO: 177 10(3)UL (ref 150–450)
PLATELET # BLD AUTO: 181 10(3)UL (ref 150–450)
PO2 BLDA: 62 MM HG (ref 80–100)
PO2 BLDA: 66 MM HG (ref 80–100)
POTASSIUM (BLOOD GAS): 3.7 MMOL/L (ref 3.3–5.1)
POTASSIUM SERPL-SCNC: 3.3 MMOL/L (ref 3.5–5.1)
POTASSIUM SERPL-SCNC: 3.7 MMOL/L (ref 3.5–5.1)
POTASSIUM SERPL-SCNC: 3.8 MMOL/L (ref 3.5–5.1)
POTASSIUM SERPL-SCNC: 3.8 MMOL/L (ref 3.5–5.1)
POTASSIUM SERPL-SCNC: 3.9 MMOL/L (ref 3.5–5.1)
POTASSIUM SERPL-SCNC: 4 MMOL/L (ref 3.5–5.1)
POTASSIUM SERPL-SCNC: 4.4 MMOL/L (ref 3.5–5.1)
POTASSIUM SERPL-SCNC: 5.1 MMOL/L (ref 3.5–5.1)
PRESSURE SUPPORT SETTING VENT: 10 CM H2O
PRESSURE SUPPORT SETTING VENT: 10 CM H2O
PROT UR-MCNC: NEGATIVE MG/DL
PROTHROMBIN TIME: 13.8 SECONDS (ref 11.8–14.5)
PROTHROMBIN TIME: 16.4 SECONDS (ref 11.8–14.5)
PUNCTURE CHARGE: NO
PUNCTURE CHARGE: NO
RBC # BLD AUTO: 3.15 X10(6)UL (ref 3.8–5.8)
RBC # BLD AUTO: 3.43 X10(6)UL (ref 3.8–5.8)
RBC # BLD AUTO: 3.59 X10(6)UL (ref 3.8–5.8)
RBC # BLD AUTO: 3.68 X10(6)UL (ref 3.8–5.8)
RBC # BLD AUTO: 3.96 X10(6)UL (ref 3.8–5.8)
RBC # BLD AUTO: 4.39 X10(6)UL (ref 3.8–5.8)
RBC # BLD AUTO: 4.5 X10(6)UL (ref 3.8–5.8)
RESP RATE: 10 BPM
RH BLOOD TYPE: POSITIVE
SAO2 % BLDA: 93 % (ref 94–100)
SAO2 % BLDA: 95.7 % (ref 94–100)
SARS-COV-2 RNA RESP QL NAA+PROBE: NOT DETECTED
SARS-COV-2 RNA RESP QL NAA+PROBE: NOT DETECTED
SODIUM (BLOOD GAS): 141 MMOL/L (ref 135–145)
SODIUM SERPL-SCNC: 141 MMOL/L (ref 136–145)
SODIUM SERPL-SCNC: 141 MMOL/L (ref 136–145)
SODIUM SERPL-SCNC: 142 MMOL/L (ref 136–145)
SODIUM SERPL-SCNC: 143 MMOL/L (ref 136–145)
SODIUM SERPL-SCNC: 146 MMOL/L (ref 136–145)
SODIUM SERPL-SCNC: 146 MMOL/L (ref 136–145)
SP GR UR STRIP: 1 (ref 1–1.03)
SPECIMEN VOL 24H UR: 850 ML
TRIGL SERPL-MCNC: 616 MG/DL (ref 30–149)
TROPONIN I SERPL-MCNC: <0.045 NG/ML (ref ?–0.04)
TSI SER-ACNC: 2.53 MIU/ML (ref 0.36–3.74)
UROBILINOGEN UR STRIP-ACNC: <2
WBC # BLD AUTO: 10.2 X10(3) UL (ref 4–11)
WBC # BLD AUTO: 6.6 X10(3) UL (ref 4–11)
WBC # BLD AUTO: 6.7 X10(3) UL (ref 4–11)
WBC # BLD AUTO: 7.2 X10(3) UL (ref 4–11)
WBC # BLD AUTO: 7.4 X10(3) UL (ref 4–11)
WBC # BLD AUTO: 8.2 X10(3) UL (ref 4–11)
WBC # BLD AUTO: 9.9 X10(3) UL (ref 4–11)

## 2020-01-01 PROCEDURE — 93798 PHYS/QHP OP CAR RHAB W/ECG: CPT

## 2020-01-01 PROCEDURE — 90662 IIV NO PRSV INCREASED AG IM: CPT | Performed by: INTERNAL MEDICINE

## 2020-01-01 PROCEDURE — 1111F DSCHRG MED/CURRENT MED MERGE: CPT

## 2020-01-01 PROCEDURE — 93306 TTE W/DOPPLER COMPLETE: CPT | Performed by: CLINICAL NURSE SPECIALIST

## 2020-01-01 PROCEDURE — 99213 OFFICE O/P EST LOW 20 MIN: CPT | Performed by: INTERNAL MEDICINE

## 2020-01-01 PROCEDURE — G0463 HOSPITAL OUTPT CLINIC VISIT: HCPCS | Performed by: INTERNAL MEDICINE

## 2020-01-01 PROCEDURE — 99232 SBSQ HOSP IP/OBS MODERATE 35: CPT | Performed by: HOSPITALIST

## 2020-01-01 PROCEDURE — G0463 HOSPITAL OUTPT CLINIC VISIT: HCPCS | Performed by: NURSE PRACTITIONER

## 2020-01-01 PROCEDURE — 99233 SBSQ HOSP IP/OBS HIGH 50: CPT | Performed by: INTERNAL MEDICINE

## 2020-01-01 PROCEDURE — 84403 ASSAY OF TOTAL TESTOSTERONE: CPT

## 2020-01-01 PROCEDURE — 99213 OFFICE O/P EST LOW 20 MIN: CPT | Performed by: UROLOGY

## 2020-01-01 PROCEDURE — 02100Z9 BYPASS CORONARY ARTERY, ONE ARTERY FROM LEFT INTERNAL MAMMARY, OPEN APPROACH: ICD-10-PCS | Performed by: THORACIC SURGERY (CARDIOTHORACIC VASCULAR SURGERY)

## 2020-01-01 PROCEDURE — 71046 X-RAY EXAM CHEST 2 VIEWS: CPT | Performed by: PHYSICIAN ASSISTANT

## 2020-01-01 PROCEDURE — 80061 LIPID PANEL: CPT

## 2020-01-01 PROCEDURE — G0008 ADMIN INFLUENZA VIRUS VAC: HCPCS | Performed by: INTERNAL MEDICINE

## 2020-01-01 PROCEDURE — 71046 X-RAY EXAM CHEST 2 VIEWS: CPT | Performed by: INTERNAL MEDICINE

## 2020-01-01 PROCEDURE — 99232 SBSQ HOSP IP/OBS MODERATE 35: CPT | Performed by: INTERNAL MEDICINE

## 2020-01-01 PROCEDURE — 021109W BYPASS CORONARY ARTERY, TWO ARTERIES FROM AORTA WITH AUTOLOGOUS VENOUS TISSUE, OPEN APPROACH: ICD-10-PCS | Performed by: THORACIC SURGERY (CARDIOTHORACIC VASCULAR SURGERY)

## 2020-01-01 PROCEDURE — 36415 COLL VENOUS BLD VENIPUNCTURE: CPT

## 2020-01-01 PROCEDURE — 80048 BASIC METABOLIC PNL TOTAL CA: CPT

## 2020-01-01 PROCEDURE — 99214 OFFICE O/P EST MOD 30 MIN: CPT | Performed by: INTERNAL MEDICINE

## 2020-01-01 PROCEDURE — 06BQ4ZZ EXCISION OF LEFT SAPHENOUS VEIN, PERCUTANEOUS ENDOSCOPIC APPROACH: ICD-10-PCS | Performed by: THORACIC SURGERY (CARDIOTHORACIC VASCULAR SURGERY)

## 2020-01-01 PROCEDURE — 71045 X-RAY EXAM CHEST 1 VIEW: CPT | Performed by: CLINICAL NURSE SPECIALIST

## 2020-01-01 PROCEDURE — 99214 OFFICE O/P EST MOD 30 MIN: CPT | Performed by: NURSE PRACTITIONER

## 2020-01-01 PROCEDURE — 76942 ECHO GUIDE FOR BIOPSY: CPT | Performed by: ANESTHESIOLOGY

## 2020-01-01 PROCEDURE — 99233 SBSQ HOSP IP/OBS HIGH 50: CPT | Performed by: HOSPITALIST

## 2020-01-01 PROCEDURE — 84153 ASSAY OF PSA TOTAL: CPT

## 2020-01-01 PROCEDURE — 5A1221Z PERFORMANCE OF CARDIAC OUTPUT, CONTINUOUS: ICD-10-PCS | Performed by: THORACIC SURGERY (CARDIOTHORACIC VASCULAR SURGERY)

## 2020-01-01 PROCEDURE — 1111F DSCHRG MED/CURRENT MED MERGE: CPT | Performed by: NURSE PRACTITIONER

## 2020-01-01 PROCEDURE — 99223 1ST HOSP IP/OBS HIGH 75: CPT | Performed by: INTERNAL MEDICINE

## 2020-01-01 PROCEDURE — 99239 HOSP IP/OBS DSCHRG MGMT >30: CPT | Performed by: HOSPITALIST

## 2020-01-01 PROCEDURE — 93880 EXTRACRANIAL BILAT STUDY: CPT | Performed by: CLINICAL NURSE SPECIALIST

## 2020-01-01 PROCEDURE — 84450 TRANSFERASE (AST) (SGOT): CPT

## 2020-01-01 PROCEDURE — 93312 ECHO TRANSESOPHAGEAL: CPT | Performed by: ANESTHESIOLOGY

## 2020-01-01 PROCEDURE — 85652 RBC SED RATE AUTOMATED: CPT

## 2020-01-01 PROCEDURE — B2151ZZ FLUOROSCOPY OF LEFT HEART USING LOW OSMOLAR CONTRAST: ICD-10-PCS | Performed by: INTERNAL MEDICINE

## 2020-01-01 PROCEDURE — 84460 ALANINE AMINO (ALT) (SGPT): CPT

## 2020-01-01 PROCEDURE — B24BZZ4 ULTRASONOGRAPHY OF HEART WITH AORTA, TRANSESOPHAGEAL: ICD-10-PCS | Performed by: THORACIC SURGERY (CARDIOTHORACIC VASCULAR SURGERY)

## 2020-01-01 PROCEDURE — 96402 CHEMO HORMON ANTINEOPL SQ/IM: CPT | Performed by: UROLOGY

## 2020-01-01 PROCEDURE — 71045 X-RAY EXAM CHEST 1 VIEW: CPT

## 2020-01-01 PROCEDURE — 4A023N7 MEASUREMENT OF CARDIAC SAMPLING AND PRESSURE, LEFT HEART, PERCUTANEOUS APPROACH: ICD-10-PCS | Performed by: INTERNAL MEDICINE

## 2020-01-01 PROCEDURE — G0463 HOSPITAL OUTPT CLINIC VISIT: HCPCS | Performed by: UROLOGY

## 2020-01-01 PROCEDURE — 99220 INITIAL OBSERVATION CARE,LEVL III: CPT | Performed by: HOSPITALIST

## 2020-01-01 PROCEDURE — B2111ZZ FLUOROSCOPY OF MULTIPLE CORONARY ARTERIES USING LOW OSMOLAR CONTRAST: ICD-10-PCS | Performed by: INTERNAL MEDICINE

## 2020-01-01 DEVICE — IMPLANTABLE DEVICE
Type: IMPLANTABLE DEVICE | Site: CHEST | Status: FUNCTIONAL
Brand: STERNALOCK® BLU SYSTEM

## 2020-01-01 RX ORDER — LANCETS 33 GAUGE
1 EACH MISCELLANEOUS DAILY
Qty: 300 EACH | Refills: 5 | Status: SHIPPED | OUTPATIENT
Start: 2020-01-01 | End: 2020-01-01

## 2020-01-01 RX ORDER — ENOXAPARIN SODIUM 100 MG/ML
40 INJECTION SUBCUTANEOUS DAILY
Status: DISCONTINUED | OUTPATIENT
Start: 2020-01-01 | End: 2020-01-01

## 2020-01-01 RX ORDER — HYDROCODONE BITARTRATE AND ACETAMINOPHEN 5; 325 MG/1; MG/1
1 TABLET ORAL EVERY 4 HOURS PRN
Status: DISCONTINUED | OUTPATIENT
Start: 2020-01-01 | End: 2020-01-01

## 2020-01-01 RX ORDER — LANCETS 33 GAUGE
1 EACH MISCELLANEOUS DAILY
Qty: 300 EACH | Refills: 5 | Status: SHIPPED | OUTPATIENT
Start: 2020-01-01

## 2020-01-01 RX ORDER — ONDANSETRON 2 MG/ML
4 INJECTION INTRAMUSCULAR; INTRAVENOUS EVERY 6 HOURS PRN
Status: DISCONTINUED | OUTPATIENT
Start: 2020-01-01 | End: 2020-01-01

## 2020-01-01 RX ORDER — HYDROCODONE BITARTRATE AND ACETAMINOPHEN 5; 325 MG/1; MG/1
2 TABLET ORAL EVERY 4 HOURS PRN
Status: DISCONTINUED | OUTPATIENT
Start: 2020-01-01 | End: 2020-01-01

## 2020-01-01 RX ORDER — METHYLPREDNISOLONE 4 MG/1
TABLET ORAL
Qty: 21 TABLET | Refills: 0 | Status: SHIPPED | OUTPATIENT
Start: 2020-01-01 | End: 2021-01-01 | Stop reason: ALTCHOICE

## 2020-01-01 RX ORDER — FUROSEMIDE 10 MG/ML
20 INJECTION INTRAMUSCULAR; INTRAVENOUS ONCE
Status: COMPLETED | OUTPATIENT
Start: 2020-01-01 | End: 2020-01-01

## 2020-01-01 RX ORDER — DORZOLAMIDE HCL 20 MG/ML
1 SOLUTION/ DROPS OPHTHALMIC 2 TIMES DAILY
Status: DISCONTINUED | OUTPATIENT
Start: 2020-01-01 | End: 2020-01-01

## 2020-01-01 RX ORDER — BISACODYL 10 MG
10 SUPPOSITORY, RECTAL RECTAL
Status: DISCONTINUED | OUTPATIENT
Start: 2020-01-01 | End: 2020-01-01

## 2020-01-01 RX ORDER — TAMSULOSIN HYDROCHLORIDE 0.4 MG/1
CAPSULE ORAL
Qty: 90 CAPSULE | Refills: 3 | Status: SHIPPED | OUTPATIENT
Start: 2020-01-01 | End: 2021-01-01

## 2020-01-01 RX ORDER — FAMOTIDINE 20 MG/1
20 TABLET ORAL 2 TIMES DAILY
Qty: 60 TABLET | Refills: 0 | Status: ON HOLD | OUTPATIENT
Start: 2020-01-01 | End: 2021-01-01

## 2020-01-01 RX ORDER — ATORVASTATIN CALCIUM 40 MG/1
40 TABLET, FILM COATED ORAL NIGHTLY
Status: DISCONTINUED | OUTPATIENT
Start: 2020-01-01 | End: 2020-01-01

## 2020-01-01 RX ORDER — DOXEPIN HYDROCHLORIDE 50 MG/1
1 CAPSULE ORAL DAILY
Qty: 30 TABLET | Refills: 0 | Status: SHIPPED | OUTPATIENT
Start: 2020-01-01 | End: 2021-01-01

## 2020-01-01 RX ORDER — AMIODARONE HYDROCHLORIDE 200 MG/1
200 TABLET ORAL
Status: DISCONTINUED | OUTPATIENT
Start: 2020-01-01 | End: 2020-01-01

## 2020-01-01 RX ORDER — TRAMADOL HYDROCHLORIDE 50 MG/1
50 TABLET ORAL EVERY 6 HOURS PRN
Status: DISCONTINUED | OUTPATIENT
Start: 2020-01-01 | End: 2020-01-01 | Stop reason: HOSPADM

## 2020-01-01 RX ORDER — ACETAMINOPHEN 325 MG/1
650 TABLET ORAL EVERY 6 HOURS PRN
Status: DISCONTINUED | OUTPATIENT
Start: 2020-01-01 | End: 2020-01-01

## 2020-01-01 RX ORDER — ACETAMINOPHEN 160 MG/5ML
650 SOLUTION ORAL EVERY 6 HOURS PRN
Status: DISCONTINUED | OUTPATIENT
Start: 2020-01-01 | End: 2020-01-01

## 2020-01-01 RX ORDER — CLOPIDOGREL BISULFATE 75 MG/1
75 TABLET ORAL DAILY
Status: DISCONTINUED | OUTPATIENT
Start: 2020-01-01 | End: 2020-01-01

## 2020-01-01 RX ORDER — DEXMEDETOMIDINE HYDROCHLORIDE 4 UG/ML
INJECTION, SOLUTION INTRAVENOUS CONTINUOUS PRN
Status: DISCONTINUED | OUTPATIENT
Start: 2020-01-01 | End: 2020-01-01 | Stop reason: SURG

## 2020-01-01 RX ORDER — TAMSULOSIN HYDROCHLORIDE 0.4 MG/1
0.4 CAPSULE ORAL DAILY
Qty: 90 CAPSULE | Refills: 3 | OUTPATIENT
Start: 2020-01-01

## 2020-01-01 RX ORDER — ACETAMINOPHEN 325 MG/1
650 TABLET ORAL EVERY 4 HOURS PRN
Status: DISCONTINUED | OUTPATIENT
Start: 2020-01-01 | End: 2020-01-01

## 2020-01-01 RX ORDER — ATORVASTATIN CALCIUM 40 MG/1
TABLET, FILM COATED ORAL
Qty: 90 TABLET | Refills: 1 | Status: SHIPPED | OUTPATIENT
Start: 2020-01-01 | End: 2021-01-01

## 2020-01-01 RX ORDER — FAMOTIDINE 20 MG/1
20 TABLET ORAL 2 TIMES DAILY
Status: DISCONTINUED | OUTPATIENT
Start: 2020-01-01 | End: 2020-01-01

## 2020-01-01 RX ORDER — POTASSIUM CHLORIDE 14.9 MG/ML
20 INJECTION INTRAVENOUS AS NEEDED
Status: DISCONTINUED | OUTPATIENT
Start: 2020-01-01 | End: 2020-01-01

## 2020-01-01 RX ORDER — NITROGLYCERIN 0.4 MG/1
0.4 TABLET SUBLINGUAL EVERY 5 MIN PRN
Status: DISCONTINUED | OUTPATIENT
Start: 2020-01-01 | End: 2020-01-01

## 2020-01-01 RX ORDER — MIDAZOLAM HYDROCHLORIDE 1 MG/ML
INJECTION INTRAMUSCULAR; INTRAVENOUS
Status: COMPLETED
Start: 2020-01-01 | End: 2020-01-01

## 2020-01-01 RX ORDER — ASPIRIN 325 MG
325 TABLET ORAL DAILY
Status: DISCONTINUED | OUTPATIENT
Start: 2020-01-01 | End: 2020-01-01

## 2020-01-01 RX ORDER — METOCLOPRAMIDE HYDROCHLORIDE 5 MG/ML
10 INJECTION INTRAMUSCULAR; INTRAVENOUS EVERY 6 HOURS
Status: DISCONTINUED | OUTPATIENT
Start: 2020-01-01 | End: 2020-01-01

## 2020-01-01 RX ORDER — DEXTROSE AND SODIUM CHLORIDE 5; .9 G/100ML; G/100ML
INJECTION, SOLUTION INTRAVENOUS CONTINUOUS
Status: DISCONTINUED | OUTPATIENT
Start: 2020-01-01 | End: 2020-01-01

## 2020-01-01 RX ORDER — VERAPAMIL HYDROCHLORIDE 2.5 MG/ML
INJECTION, SOLUTION INTRAVENOUS
Status: DISCONTINUED
Start: 2020-01-01 | End: 2020-01-01 | Stop reason: WASHOUT

## 2020-01-01 RX ORDER — MAGNESIUM OXIDE 400 MG (241.3 MG MAGNESIUM) TABLET
3 TABLET NIGHTLY PRN
Status: DISCONTINUED | OUTPATIENT
Start: 2020-01-01 | End: 2020-01-01

## 2020-01-01 RX ORDER — CHLORHEXIDINE GLUCONATE 0.12 MG/ML
15 RINSE ORAL
Status: DISCONTINUED | OUTPATIENT
Start: 2020-01-01 | End: 2020-01-01

## 2020-01-01 RX ORDER — LIDOCAINE HYDROCHLORIDE 10 MG/ML
INJECTION, SOLUTION INFILTRATION; PERINEURAL
Status: COMPLETED | OUTPATIENT
Start: 2020-01-01 | End: 2020-01-01

## 2020-01-01 RX ORDER — GLYCOPYRROLATE 0.2 MG/ML
INJECTION, SOLUTION INTRAMUSCULAR; INTRAVENOUS AS NEEDED
Status: DISCONTINUED | OUTPATIENT
Start: 2020-01-01 | End: 2020-01-01 | Stop reason: SURG

## 2020-01-01 RX ORDER — ASCORBIC ACID 500 MG
500 TABLET ORAL 3 TIMES DAILY
Status: DISCONTINUED | OUTPATIENT
Start: 2020-01-01 | End: 2020-01-01

## 2020-01-01 RX ORDER — DIAPER,BRIEF,INFANT-TODD,DISP
EACH MISCELLANEOUS 2 TIMES DAILY
Status: DISCONTINUED | OUTPATIENT
Start: 2020-01-01 | End: 2020-01-01

## 2020-01-01 RX ORDER — CHLORHEXIDINE GLUCONATE 4 G/100ML
30 SOLUTION TOPICAL
Status: COMPLETED | OUTPATIENT
Start: 2020-01-01 | End: 2020-01-01

## 2020-01-01 RX ORDER — SUFENTANIL CITRATE 50 UG/ML
INJECTION EPIDURAL; INTRAVENOUS AS NEEDED
Status: DISCONTINUED | OUTPATIENT
Start: 2020-01-01 | End: 2020-01-01

## 2020-01-01 RX ORDER — HEPARIN SODIUM 1000 [USP'U]/ML
INJECTION, SOLUTION INTRAVENOUS; SUBCUTANEOUS
Status: DISCONTINUED
Start: 2020-01-01 | End: 2020-01-01 | Stop reason: WASHOUT

## 2020-01-01 RX ORDER — TEMAZEPAM 7.5 MG/1
7.5 CAPSULE ORAL NIGHTLY PRN
Status: DISCONTINUED | OUTPATIENT
Start: 2020-01-01 | End: 2020-01-01

## 2020-01-01 RX ORDER — MORPHINE SULFATE 4 MG/ML
2 INJECTION, SOLUTION INTRAMUSCULAR; INTRAVENOUS EVERY 2 HOUR PRN
Status: DISCONTINUED | OUTPATIENT
Start: 2020-01-01 | End: 2020-01-01

## 2020-01-01 RX ORDER — HEPARIN SODIUM 5000 [USP'U]/ML
5000 INJECTION, SOLUTION INTRAVENOUS; SUBCUTANEOUS EVERY 12 HOURS SCHEDULED
Status: DISCONTINUED | OUTPATIENT
Start: 2020-01-01 | End: 2020-01-01

## 2020-01-01 RX ORDER — CHLORHEXIDINE GLUCONATE 0.12 MG/ML
15 RINSE ORAL 2 TIMES DAILY
Status: DISCONTINUED | OUTPATIENT
Start: 2020-01-01 | End: 2020-01-01

## 2020-01-01 RX ORDER — DEXTROSE MONOHYDRATE 25 G/50ML
50 INJECTION, SOLUTION INTRAVENOUS
Status: DISCONTINUED | OUTPATIENT
Start: 2020-01-01 | End: 2020-01-01

## 2020-01-01 RX ORDER — SENNA AND DOCUSATE SODIUM 50; 8.6 MG/1; MG/1
2 TABLET, FILM COATED ORAL 2 TIMES DAILY
Status: DISCONTINUED | OUTPATIENT
Start: 2020-01-01 | End: 2020-01-01

## 2020-01-01 RX ORDER — DOCUSATE SODIUM 100 MG/1
100 CAPSULE, LIQUID FILLED ORAL 2 TIMES DAILY
Status: DISCONTINUED | OUTPATIENT
Start: 2020-01-01 | End: 2020-01-01

## 2020-01-01 RX ORDER — MAGNESIUM HYDROXIDE 1200 MG/15ML
LIQUID ORAL CONTINUOUS PRN
Status: DISCONTINUED | OUTPATIENT
Start: 2020-01-01 | End: 2020-01-01

## 2020-01-01 RX ORDER — AMLODIPINE BESYLATE 5 MG/1
5 TABLET ORAL DAILY
Qty: 30 TABLET | Refills: 5 | Status: SHIPPED | OUTPATIENT
Start: 2020-01-01 | End: 2021-01-01

## 2020-01-01 RX ORDER — SODIUM CHLORIDE 9 MG/ML
83 INJECTION, SOLUTION INTRAVENOUS CONTINUOUS
Status: DISCONTINUED | OUTPATIENT
Start: 2020-01-01 | End: 2020-01-01

## 2020-01-01 RX ORDER — METOPROLOL TARTRATE 100 MG/1
100 TABLET ORAL
Qty: 180 TABLET | Refills: 1 | Status: SHIPPED | OUTPATIENT
Start: 2020-01-01 | End: 2021-01-01

## 2020-01-01 RX ORDER — LIDOCAINE HYDROCHLORIDE 20 MG/ML
INJECTION, SOLUTION EPIDURAL; INFILTRATION; INTRACAUDAL; PERINEURAL
Status: COMPLETED
Start: 2020-01-01 | End: 2020-01-01

## 2020-01-01 RX ORDER — ASPIRIN 81 MG/1
81 TABLET ORAL DAILY
Status: DISCONTINUED | OUTPATIENT
Start: 2020-01-01 | End: 2020-01-01

## 2020-01-01 RX ORDER — CEFAZOLIN SODIUM/WATER 2 G/20 ML
2 SYRINGE (ML) INTRAVENOUS EVERY 8 HOURS
Status: COMPLETED | OUTPATIENT
Start: 2020-01-01 | End: 2020-01-01

## 2020-01-01 RX ORDER — VANCOMYCIN HYDROCHLORIDE 1 G/20ML
INJECTION, POWDER, LYOPHILIZED, FOR SOLUTION INTRAVENOUS AS NEEDED
Status: DISCONTINUED | OUTPATIENT
Start: 2020-01-01 | End: 2020-01-01 | Stop reason: HOSPADM

## 2020-01-01 RX ORDER — SODIUM PHOSPHATE, DIBASIC AND SODIUM PHOSPHATE, MONOBASIC 7; 19 G/133ML; G/133ML
1 ENEMA RECTAL ONCE AS NEEDED
Status: DISCONTINUED | OUTPATIENT
Start: 2020-01-01 | End: 2020-01-01

## 2020-01-01 RX ORDER — TRAMADOL HYDROCHLORIDE 50 MG/1
100 TABLET ORAL EVERY 6 HOURS PRN
Status: DISCONTINUED | OUTPATIENT
Start: 2020-01-01 | End: 2020-01-01 | Stop reason: HOSPADM

## 2020-01-01 RX ORDER — DEXMEDETOMIDINE HYDROCHLORIDE 4 UG/ML
INJECTION, SOLUTION INTRAVENOUS CONTINUOUS
Status: DISCONTINUED | OUTPATIENT
Start: 2020-01-01 | End: 2020-01-01

## 2020-01-01 RX ORDER — POTASSIUM CHLORIDE 29.8 MG/ML
40 INJECTION INTRAVENOUS AS NEEDED
Status: DISCONTINUED | OUTPATIENT
Start: 2020-01-01 | End: 2020-01-01

## 2020-01-01 RX ORDER — DOXEPIN HYDROCHLORIDE 50 MG/1
1 CAPSULE ORAL DAILY
Status: DISCONTINUED | OUTPATIENT
Start: 2020-01-01 | End: 2020-01-01

## 2020-01-01 RX ORDER — ASCORBIC ACID 500 MG
1000 TABLET ORAL ONCE
Status: COMPLETED | OUTPATIENT
Start: 2020-01-01 | End: 2020-01-01

## 2020-01-01 RX ORDER — POTASSIUM CHLORIDE 20 MEQ/1
20 TABLET, EXTENDED RELEASE ORAL ONCE
Status: COMPLETED | OUTPATIENT
Start: 2020-01-01 | End: 2020-01-01

## 2020-01-01 RX ORDER — CEFAZOLIN SODIUM 1 G/3ML
INJECTION, POWDER, FOR SOLUTION INTRAMUSCULAR; INTRAVENOUS AS NEEDED
Status: DISCONTINUED | OUTPATIENT
Start: 2020-01-01 | End: 2020-01-01 | Stop reason: HOSPADM

## 2020-01-01 RX ORDER — MAGNESIUM SULFATE 1 G/100ML
1 INJECTION INTRAVENOUS AS NEEDED
Status: DISCONTINUED | OUTPATIENT
Start: 2020-01-01 | End: 2020-01-01

## 2020-01-01 RX ORDER — POLYETHYLENE GLYCOL 3350 17 G/17G
17 POWDER, FOR SOLUTION ORAL DAILY PRN
Status: DISCONTINUED | OUTPATIENT
Start: 2020-01-01 | End: 2020-01-01

## 2020-01-01 RX ORDER — CLOPIDOGREL BISULFATE 75 MG/1
75 TABLET ORAL DAILY
Qty: 90 TABLET | Refills: 0 | Status: SHIPPED | OUTPATIENT
Start: 2020-01-01 | End: 2021-01-01

## 2020-01-01 RX ORDER — FAMOTIDINE 20 MG/1
20 TABLET ORAL ONCE
Status: DISCONTINUED | OUTPATIENT
Start: 2020-01-01 | End: 2020-01-01

## 2020-01-01 RX ORDER — NITROGLYCERIN 20 MG/100ML
INJECTION INTRAVENOUS
Status: DISCONTINUED
Start: 2020-01-01 | End: 2020-01-01 | Stop reason: WASHOUT

## 2020-01-01 RX ORDER — METOCLOPRAMIDE 10 MG/1
10 TABLET ORAL ONCE
Status: DISCONTINUED | OUTPATIENT
Start: 2020-01-01 | End: 2020-01-01

## 2020-01-01 RX ORDER — ACETAMINOPHEN 650 MG/1
650 SUPPOSITORY RECTAL EVERY 6 HOURS PRN
Status: DISCONTINUED | OUTPATIENT
Start: 2020-01-01 | End: 2020-01-01

## 2020-01-01 RX ORDER — HYDROCODONE BITARTRATE AND ACETAMINOPHEN 5; 325 MG/1; MG/1
2 TABLET ORAL EVERY 4 HOURS PRN
Qty: 30 TABLET | Refills: 0 | Status: SHIPPED | OUTPATIENT
Start: 2020-01-01 | End: 2021-01-01 | Stop reason: ALTCHOICE

## 2020-01-01 RX ORDER — MORPHINE SULFATE 4 MG/ML
4 INJECTION, SOLUTION INTRAMUSCULAR; INTRAVENOUS EVERY 2 HOUR PRN
Status: DISCONTINUED | OUTPATIENT
Start: 2020-01-01 | End: 2020-01-01

## 2020-01-01 RX ORDER — NEOSTIGMINE METHYLSULFATE 1 MG/ML
3 INJECTION INTRAVENOUS ONCE
Status: DISCONTINUED | OUTPATIENT
Start: 2020-01-01 | End: 2020-01-01

## 2020-01-01 RX ORDER — SUFENTANIL CITRATE 50 UG/ML
INJECTION EPIDURAL; INTRAVENOUS AS NEEDED
Status: DISCONTINUED | OUTPATIENT
Start: 2020-01-01 | End: 2020-01-01 | Stop reason: SURG

## 2020-01-01 RX ORDER — CALCIUM GLUCONATE 94 MG/ML
INJECTION, SOLUTION INTRAVENOUS AS NEEDED
Status: DISCONTINUED | OUTPATIENT
Start: 2020-01-01 | End: 2020-01-01 | Stop reason: SURG

## 2020-01-01 RX ORDER — MORPHINE SULFATE 4 MG/ML
8 INJECTION, SOLUTION INTRAMUSCULAR; INTRAVENOUS EVERY 2 HOUR PRN
Status: DISCONTINUED | OUTPATIENT
Start: 2020-01-01 | End: 2020-01-01

## 2020-01-01 RX ORDER — GLYCOPYRROLATE 0.2 MG/ML
0.6 INJECTION, SOLUTION INTRAMUSCULAR; INTRAVENOUS ONCE
Status: DISCONTINUED | OUTPATIENT
Start: 2020-01-01 | End: 2020-01-01

## 2020-01-01 RX ORDER — TAMSULOSIN HYDROCHLORIDE 0.4 MG/1
0.4 CAPSULE ORAL DAILY
Status: DISCONTINUED | OUTPATIENT
Start: 2020-01-01 | End: 2020-01-01

## 2020-01-01 RX ORDER — ASCORBIC ACID 500 MG
500 TABLET ORAL 3 TIMES DAILY
Qty: 90 TABLET | Refills: 0 | Status: ON HOLD | OUTPATIENT
Start: 2020-01-01 | End: 2021-01-01

## 2020-01-01 RX ORDER — ACETAMINOPHEN 10 MG/ML
1000 INJECTION, SOLUTION INTRAVENOUS EVERY 8 HOURS
Status: COMPLETED | OUTPATIENT
Start: 2020-01-01 | End: 2020-01-01

## 2020-01-01 RX ORDER — METOCLOPRAMIDE HYDROCHLORIDE 5 MG/ML
10 INJECTION INTRAMUSCULAR; INTRAVENOUS EVERY 8 HOURS PRN
Status: DISCONTINUED | OUTPATIENT
Start: 2020-01-01 | End: 2020-01-01

## 2020-01-01 RX ORDER — DOBUTAMINE HYDROCHLORIDE 200 MG/100ML
INJECTION INTRAVENOUS CONTINUOUS PRN
Status: DISCONTINUED | OUTPATIENT
Start: 2020-01-01 | End: 2020-01-01

## 2020-01-01 RX ORDER — CLOPIDOGREL BISULFATE 75 MG/1
75 TABLET ORAL DAILY
Qty: 90 TABLET | Refills: 0 | Status: SHIPPED | OUTPATIENT
Start: 2020-01-01 | End: 2020-01-01

## 2020-01-01 RX ORDER — POTASSIUM CHLORIDE 20 MEQ/1
10 TABLET, EXTENDED RELEASE ORAL ONCE
Status: COMPLETED | OUTPATIENT
Start: 2020-01-01 | End: 2020-01-01

## 2020-01-01 RX ORDER — MORPHINE SULFATE 2 MG/ML
2 INJECTION, SOLUTION INTRAMUSCULAR; INTRAVENOUS ONCE
Status: COMPLETED | OUTPATIENT
Start: 2020-01-01 | End: 2020-01-01

## 2020-01-01 RX ORDER — SODIUM CHLORIDE 0.9 % (FLUSH) 0.9 %
3 SYRINGE (ML) INJECTION AS NEEDED
Status: DISCONTINUED | OUTPATIENT
Start: 2020-01-01 | End: 2020-01-01

## 2020-01-01 RX ORDER — HEPARIN SODIUM 1000 [USP'U]/ML
INJECTION, SOLUTION INTRAVENOUS; SUBCUTANEOUS AS NEEDED
Status: DISCONTINUED | OUTPATIENT
Start: 2020-01-01 | End: 2020-01-01 | Stop reason: SURG

## 2020-01-01 RX ORDER — FAMOTIDINE 10 MG/ML
20 INJECTION, SOLUTION INTRAVENOUS 2 TIMES DAILY
Status: DISCONTINUED | OUTPATIENT
Start: 2020-01-01 | End: 2020-01-01

## 2020-01-01 RX ORDER — LORAZEPAM 1 MG/1
1 TABLET ORAL ONCE
Status: COMPLETED | OUTPATIENT
Start: 2020-01-01 | End: 2020-01-01

## 2020-01-01 RX ORDER — METHYLPREDNISOLONE 4 MG/1
TABLET ORAL
Qty: 21 TABLET | Refills: 0 | OUTPATIENT
Start: 2020-01-01

## 2020-01-01 RX ORDER — ROCURONIUM BROMIDE 10 MG/ML
INJECTION, SOLUTION INTRAVENOUS AS NEEDED
Status: DISCONTINUED | OUTPATIENT
Start: 2020-01-01 | End: 2020-01-01 | Stop reason: SURG

## 2020-01-01 RX ORDER — SODIUM CHLORIDE 9 MG/ML
INJECTION, SOLUTION INTRAVENOUS
Status: COMPLETED | OUTPATIENT
Start: 2020-01-01 | End: 2020-01-01

## 2020-01-01 RX ORDER — DIPHENHYDRAMINE HYDROCHLORIDE 50 MG/ML
INJECTION INTRAMUSCULAR; INTRAVENOUS
Status: COMPLETED
Start: 2020-01-01 | End: 2020-01-01

## 2020-01-01 RX ORDER — MILRINONE LACTATE 0.2 MG/ML
0.38 INJECTION, SOLUTION INTRAVENOUS AS NEEDED
Status: DISCONTINUED | OUTPATIENT
Start: 2020-01-01 | End: 2020-01-01

## 2020-01-01 RX ORDER — NITROGLYCERIN 20 MG/100ML
INJECTION INTRAVENOUS CONTINUOUS PRN
Status: DISCONTINUED | OUTPATIENT
Start: 2020-01-01 | End: 2020-01-01

## 2020-01-01 RX ORDER — FUROSEMIDE 10 MG/ML
20 INJECTION INTRAMUSCULAR; INTRAVENOUS ONCE
Status: DISCONTINUED | OUTPATIENT
Start: 2020-01-01 | End: 2020-01-01

## 2020-01-01 RX ORDER — MAGNESIUM SULFATE HEPTAHYDRATE 40 MG/ML
2 INJECTION, SOLUTION INTRAVENOUS AS NEEDED
Status: DISCONTINUED | OUTPATIENT
Start: 2020-01-01 | End: 2020-01-01

## 2020-01-01 RX ORDER — ALBUMIN, HUMAN INJ 5% 5 %
250 SOLUTION INTRAVENOUS ONCE AS NEEDED
Status: DISCONTINUED | OUTPATIENT
Start: 2020-01-01 | End: 2020-01-01

## 2020-01-01 RX ORDER — CEFAZOLIN SODIUM/WATER 2 G/20 ML
2 SYRINGE (ML) INTRAVENOUS
Status: COMPLETED | OUTPATIENT
Start: 2020-01-01 | End: 2020-01-01

## 2020-01-01 RX ORDER — NITROGLYCERIN 0.4 MG/1
0.4 TABLET SUBLINGUAL ONCE
Status: COMPLETED | OUTPATIENT
Start: 2020-01-01 | End: 2020-01-01

## 2020-01-01 RX ADMIN — ONDANSETRON 4 MG: 2 INJECTION INTRAMUSCULAR; INTRAVENOUS at 12:32:00

## 2020-01-01 RX ADMIN — CEFAZOLIN SODIUM/WATER 2 G: 2 G/20 ML SYRINGE (ML) INTRAVENOUS at 12:34:00

## 2020-01-01 RX ADMIN — SODIUM CHLORIDE: 9 INJECTION, SOLUTION INTRAVENOUS at 17:39:00

## 2020-01-01 RX ADMIN — SUFENTANIL CITRATE 100 MCG: 50 INJECTION EPIDURAL; INTRAVENOUS at 12:32:00

## 2020-01-01 RX ADMIN — CALCIUM GLUCONATE 1 G: 94 INJECTION, SOLUTION INTRAVENOUS at 16:17:00

## 2020-01-01 RX ADMIN — HEPARIN SODIUM 38000 UNITS: 1000 INJECTION, SOLUTION INTRAVENOUS; SUBCUTANEOUS at 14:12:00

## 2020-01-01 RX ADMIN — SUFENTANIL CITRATE 50 MCG: 50 INJECTION EPIDURAL; INTRAVENOUS at 17:26:00

## 2020-01-01 RX ADMIN — GLYCOPYRROLATE 0.2 MG: 0.2 INJECTION, SOLUTION INTRAMUSCULAR; INTRAVENOUS at 12:32:00

## 2020-01-01 RX ADMIN — SODIUM CHLORIDE: 9 INJECTION, SOLUTION INTRAVENOUS at 12:24:00

## 2020-01-01 RX ADMIN — Medication 50 ML: at 16:18:00

## 2020-01-01 RX ADMIN — LIDOCAINE HYDROCHLORIDE 5 ML: 10 INJECTION, SOLUTION INFILTRATION; PERINEURAL at 12:40:00

## 2020-01-01 RX ADMIN — SUFENTANIL CITRATE 50 MCG: 50 INJECTION EPIDURAL; INTRAVENOUS at 14:39:00

## 2020-01-01 RX ADMIN — ROCURONIUM BROMIDE 100 MG: 10 INJECTION, SOLUTION INTRAVENOUS at 12:32:00

## 2020-01-01 RX ADMIN — LIDOCAINE HYDROCHLORIDE 5 ML: 10 INJECTION, SOLUTION INFILTRATION; PERINEURAL at 12:27:00

## 2020-01-01 RX ADMIN — DEXMEDETOMIDINE HYDROCHLORIDE 0.6 MCG/KG/HR: 4 INJECTION, SOLUTION INTRAVENOUS at 12:35:00

## 2020-01-01 RX ADMIN — SUFENTANIL CITRATE 50 MCG: 50 INJECTION EPIDURAL; INTRAVENOUS at 16:19:00

## 2020-01-01 RX ADMIN — ROCURONIUM BROMIDE 50 MG: 10 INJECTION, SOLUTION INTRAVENOUS at 14:39:00

## 2020-01-07 ENCOUNTER — OFFICE VISIT (OUTPATIENT)
Dept: SURGERY | Facility: CLINIC | Age: 70
End: 2020-01-07
Payer: MEDICARE

## 2020-01-07 VITALS
WEIGHT: 220 LBS | BODY MASS INDEX: 32.58 KG/M2 | DIASTOLIC BLOOD PRESSURE: 70 MMHG | SYSTOLIC BLOOD PRESSURE: 132 MMHG | HEART RATE: 64 BPM | HEIGHT: 69 IN

## 2020-01-07 DIAGNOSIS — R97.21 RISING PSA FOLLOWING TREATMENT FOR MALIGNANT NEOPLASM OF PROSTATE: Primary | ICD-10-CM

## 2020-01-07 DIAGNOSIS — R82.90 URINE FINDING: ICD-10-CM

## 2020-01-07 DIAGNOSIS — N35.011 POST-TRAUMATIC BULBOUS URETHRAL STRICTURE: ICD-10-CM

## 2020-01-07 DIAGNOSIS — C61 MALIGNANT NEOPLASM OF PROSTATE (HCC): ICD-10-CM

## 2020-01-07 LAB
APPEARANCE: CLEAR
BILIRUBIN: NEGATIVE
GLUCOSE (URINE DIPSTICK): NEGATIVE MG/DL
KETONES (URINE DIPSTICK): NEGATIVE MG/DL
LEUKOCYTES: NEGATIVE
MULTISTIX LOT#: NORMAL NUMERIC
NITRITE, URINE: NEGATIVE
OCCULT BLOOD: NEGATIVE
PH, URINE: 6 (ref 4.5–8)
PROTEIN (URINE DIPSTICK): NEGATIVE MG/DL
SPECIFIC GRAVITY: 1.02 (ref 1–1.03)
URINE-COLOR: YELLOW
UROBILINOGEN,SEMI-QN: 0.2 MG/DL (ref 0–1.9)

## 2020-01-07 PROCEDURE — G0463 HOSPITAL OUTPT CLINIC VISIT: HCPCS | Performed by: UROLOGY

## 2020-01-07 PROCEDURE — 99204 OFFICE O/P NEW MOD 45 MIN: CPT | Performed by: UROLOGY

## 2020-01-07 PROCEDURE — 81003 URINALYSIS AUTO W/O SCOPE: CPT | Performed by: UROLOGY

## 2020-01-07 NOTE — PROGRESS NOTES
[facility], [facility] Urology  Initial Office Consultation    HPI:   Hossein Flower is a 68year old male here today for consultation at the request of, and a copy of this note will be sent to, Antonia Fox MD.    1. BCR Following Definitive RT for Unfavo Patient denies family history of prostate cancer. Father is  at the age of 54 from CAD.     Past Surgical History:   Procedure Laterality Date   • Angioplasty (coronary)      and stent of LCx   • Card echo stress echo/rest and stress(cpt=93350/ 10/12/2012 4.0   4/4/2011 2.0     Dipstick U/A (1/7/2019): Negative for leuks, nitrites, blood, ketones, and glucose. IMAGING:  No results found.     IMPRESSION:  68year old male with biochemical recurrence following definitive RT for unfavorable inte

## 2020-01-18 ENCOUNTER — LAB ENCOUNTER (OUTPATIENT)
Dept: LAB | Age: 70
End: 2020-01-18
Attending: INTERNAL MEDICINE
Payer: MEDICARE

## 2020-01-18 DIAGNOSIS — R97.21 RISING PSA FOLLOWING TREATMENT FOR MALIGNANT NEOPLASM OF PROSTATE: ICD-10-CM

## 2020-01-18 DIAGNOSIS — E11.9 TYPE 2 DIABETES MELLITUS WITHOUT COMPLICATION, WITHOUT LONG-TERM CURRENT USE OF INSULIN (HCC): ICD-10-CM

## 2020-01-18 DIAGNOSIS — C61 MALIGNANT NEOPLASM OF PROSTATE (HCC): ICD-10-CM

## 2020-01-18 LAB
EST. AVERAGE GLUCOSE BLD GHB EST-MCNC: 171 MG/DL (ref 68–126)
GLUCOSE BLD-MCNC: 142 MG/DL (ref 70–99)
HBA1C MFR BLD HPLC: 7.6 % (ref ?–5.7)
PSA SERPL-MCNC: 6.97 NG/ML (ref ?–4)

## 2020-01-18 PROCEDURE — 36415 COLL VENOUS BLD VENIPUNCTURE: CPT

## 2020-01-18 PROCEDURE — 84153 ASSAY OF PSA TOTAL: CPT

## 2020-01-18 PROCEDURE — 83036 HEMOGLOBIN GLYCOSYLATED A1C: CPT

## 2020-01-18 PROCEDURE — 82947 ASSAY GLUCOSE BLOOD QUANT: CPT

## 2020-01-22 ENCOUNTER — OFFICE VISIT (OUTPATIENT)
Dept: INTERNAL MEDICINE CLINIC | Facility: CLINIC | Age: 70
End: 2020-01-22
Payer: MEDICARE

## 2020-01-22 VITALS
DIASTOLIC BLOOD PRESSURE: 76 MMHG | SYSTOLIC BLOOD PRESSURE: 158 MMHG | HEIGHT: 69 IN | RESPIRATION RATE: 20 BRPM | BODY MASS INDEX: 31.25 KG/M2 | HEART RATE: 70 BPM | TEMPERATURE: 98 F | WEIGHT: 211 LBS

## 2020-01-22 DIAGNOSIS — I10 HYPERTENSION, UNSPECIFIED TYPE: ICD-10-CM

## 2020-01-22 DIAGNOSIS — I25.10 ATHEROSCLEROSIS OF NATIVE CORONARY ARTERY OF NATIVE HEART WITHOUT ANGINA PECTORIS: ICD-10-CM

## 2020-01-22 DIAGNOSIS — M10.9 GOUT, UNSPECIFIED CAUSE, UNSPECIFIED CHRONICITY, UNSPECIFIED SITE: ICD-10-CM

## 2020-01-22 DIAGNOSIS — E78.5 HYPERLIPIDEMIA, UNSPECIFIED HYPERLIPIDEMIA TYPE: ICD-10-CM

## 2020-01-22 DIAGNOSIS — E11.9 TYPE 2 DIABETES MELLITUS WITHOUT COMPLICATION, WITHOUT LONG-TERM CURRENT USE OF INSULIN (HCC): Primary | ICD-10-CM

## 2020-01-22 DIAGNOSIS — C61 MALIGNANT NEOPLASM OF PROSTATE (HCC): ICD-10-CM

## 2020-01-22 DIAGNOSIS — T78.3XXS ANGIOEDEMA, SEQUELA: ICD-10-CM

## 2020-01-22 PROCEDURE — 99214 OFFICE O/P EST MOD 30 MIN: CPT | Performed by: INTERNAL MEDICINE

## 2020-01-22 PROCEDURE — G0463 HOSPITAL OUTPT CLINIC VISIT: HCPCS | Performed by: INTERNAL MEDICINE

## 2020-01-22 NOTE — PROGRESS NOTES
HPI:    Patient ID: Lexis Garcia is a 68year old male. HPI  Patient is here for follow-up on chronic medical issues as listed below. Last seen here in middle of October for new elevation of PSA. Also other blood tests.   PSA at that time was 6.7 arthritis ([address]     bilateral knees      Past Surgical History:   Procedure Laterality Date   • ANGIOPLASTY (CORONARY)  2004    and stent of LCx   • CARD ECHO STRESS ECHO/REST AND STRESS(CPT=93350/72127 DM 38351)  2011    per NG: normal stress echo, (CAD) DAILY 90 tablet 1   • ATORVASTATIN 40 MG Oral Tab TAKE ONE TABLET BY MOUTH ONE TIME DAILY  90 tablet 1   • Levocetirizine Dihydrochloride (XYZAL) 5 MG Oral Tab Take 1 tablet (5 mg total) by mouth nightly.  30 tablet 0   • B Complex Vitamins (VITAMIN-B COMPL No rash noted. Psychiatric: He has a normal mood and affect.  Thought content normal.       Wt Readings from Last 6 Encounters:  01/22/20 : 211 lb (95.7 kg)  01/07/20 : 220 lb (99.8 kg)  12/02/19 : 220 lb (99.8 kg)  10/31/19 : 221 lb 8 oz (100.5 kg)  10/1

## 2020-02-04 ENCOUNTER — HOSPITAL ENCOUNTER (OUTPATIENT)
Dept: CT IMAGING | Facility: HOSPITAL | Age: 70
Discharge: HOME | End: 2020-02-04
Attending: UROLOGY
Payer: MEDICARE

## 2020-02-04 DIAGNOSIS — R97.21 RISING PSA FOLLOWING TREATMENT FOR MALIGNANT NEOPLASM OF PROSTATE: ICD-10-CM

## 2020-02-04 DIAGNOSIS — C61 MALIGNANT NEOPLASM OF PROSTATE (HCC): ICD-10-CM

## 2020-02-04 LAB — CREAT BLD-MCNC: 0.5 MG/DL (ref 0.7–1.3)

## 2020-02-04 PROCEDURE — 82565 ASSAY OF CREATININE: CPT

## 2020-02-04 PROCEDURE — 74177 CT ABD & PELVIS W/CONTRAST: CPT | Performed by: UROLOGY

## 2020-02-11 ENCOUNTER — HOSPITAL ENCOUNTER (OUTPATIENT)
Dept: NUCLEAR MEDICINE | Facility: HOSPITAL | Age: 70
Discharge: HOME | End: 2020-02-11
Attending: UROLOGY
Payer: MEDICARE

## 2020-02-11 DIAGNOSIS — R97.21 RISING PSA FOLLOWING TREATMENT FOR MALIGNANT NEOPLASM OF PROSTATE: ICD-10-CM

## 2020-02-11 DIAGNOSIS — C61 MALIGNANT NEOPLASM OF PROSTATE (HCC): ICD-10-CM

## 2020-02-11 PROCEDURE — 78306 BONE IMAGING WHOLE BODY: CPT | Performed by: UROLOGY

## 2020-02-26 ENCOUNTER — OFFICE VISIT (OUTPATIENT)
Dept: SURGERY | Facility: CLINIC | Age: 70
End: 2020-02-26
Payer: MEDICARE

## 2020-02-26 DIAGNOSIS — R97.21 RISING PSA FOLLOWING TREATMENT FOR MALIGNANT NEOPLASM OF PROSTATE: Primary | ICD-10-CM

## 2020-02-26 DIAGNOSIS — C61 MALIGNANT NEOPLASM OF PROSTATE (HCC): ICD-10-CM

## 2020-02-26 DIAGNOSIS — R39.9 LOWER URINARY TRACT SYMPTOMS (LUTS): ICD-10-CM

## 2020-02-26 DIAGNOSIS — N35.011 POST-TRAUMATIC BULBOUS URETHRAL STRICTURE: ICD-10-CM

## 2020-02-26 PROCEDURE — 99214 OFFICE O/P EST MOD 30 MIN: CPT | Performed by: UROLOGY

## 2020-02-26 PROCEDURE — G0463 HOSPITAL OUTPT CLINIC VISIT: HCPCS | Performed by: UROLOGY

## 2020-02-26 RX ORDER — TAMSULOSIN HYDROCHLORIDE 0.4 MG/1
0.4 CAPSULE ORAL DAILY
Qty: 90 CAPSULE | Refills: 1 | Status: SHIPPED | OUTPATIENT
Start: 2020-02-26 | End: 2020-01-01

## 2020-02-27 NOTE — PROGRESS NOTES
[facility] Urology  Follow-Up Visit    HPI: Hamilton Allen is a 68year old male presents for a follow up visit. Patient was last seen on 1/7/2020.     INTERVAL HISTORY: Here for uroflow and PVR measurement for further evaluation of lower urinary t he empties his bladder well. He denies gross hematuria or dysuria, history of recurrent UTIs or nephrolithiasis. Patient denies unintentional weight loss, change in appetite, or bone pain. - 2/2020 uroflow and PVR.   Inadequate voided volume of 41 mL's 6.77 (H)   6/5/2018 1.3   5/10/2017 0.1   1/26/2016 0.1   2/25/2015 16.4 (H)   10/12/2012 4.0   4/4/2011 2.0     IMAGING:    CT Abdomen & Pelvis with IV (2/4/2020):  1. There is a history of prostate carcinoma with recent biochemical recurrence.   Brachythe within normal limits. I recommend proceeding with office cystoscopy to visually evaluate the urethra and bladder for recurrent strictures, outlet obstruction, or bladder pathology. Patient verbalized understanding agrees the plan of care.   All his ques

## 2020-03-16 ENCOUNTER — HOSPITAL ENCOUNTER (OUTPATIENT)
Dept: NUCLEAR MEDICINE | Facility: HOSPITAL | Age: 70
Discharge: HOME | End: 2020-03-16
Attending: UROLOGY
Payer: MEDICARE

## 2020-03-16 DIAGNOSIS — R97.21 RISING PSA FOLLOWING TREATMENT FOR MALIGNANT NEOPLASM OF PROSTATE: ICD-10-CM

## 2020-03-16 DIAGNOSIS — C61 MALIGNANT NEOPLASM OF PROSTATE (HCC): ICD-10-CM

## 2020-03-16 PROCEDURE — 78815 PET IMAGE W/CT SKULL-THIGH: CPT | Performed by: UROLOGY

## 2020-03-23 ENCOUNTER — TELEPHONE (OUTPATIENT)
Dept: SURGERY | Facility: CLINIC | Age: 70
End: 2020-03-23

## 2020-03-23 NOTE — TELEPHONE ENCOUNTER
Per pt is wanting to reschedule procedure for a few weeks. Procedure scheduled for 3/26/20.  Please advise

## 2020-03-23 NOTE — TELEPHONE ENCOUNTER
Called patient. Plan at this time is to proceed with procedure. After discussing with patient he was agreeable to proceed.

## 2020-03-23 NOTE — TELEPHONE ENCOUNTER
I called pt verified name/ and offered pt mary with Dr. Coco Palmer on Wednesday 3/25/20 at 3:20pm instead of 3/26/2020 due to the recent COVID-19 crisis pt agrees and will keep mary. I will have supervisor open the mary up tomorrow.

## 2020-03-25 ENCOUNTER — PROCEDURE (OUTPATIENT)
Dept: SURGERY | Facility: CLINIC | Age: 70
End: 2020-03-25
Payer: MEDICARE

## 2020-03-25 VITALS
BODY MASS INDEX: 31 KG/M2 | DIASTOLIC BLOOD PRESSURE: 72 MMHG | SYSTOLIC BLOOD PRESSURE: 142 MMHG | HEART RATE: 71 BPM | WEIGHT: 211 LBS

## 2020-03-25 DIAGNOSIS — R39.9 LOWER URINARY TRACT SYMPTOMS (LUTS): ICD-10-CM

## 2020-03-25 DIAGNOSIS — Z87.448 HISTORY OF URETHRAL STRICTURE: ICD-10-CM

## 2020-03-25 DIAGNOSIS — C61 MALIGNANT NEOPLASM OF PROSTATE (HCC): ICD-10-CM

## 2020-03-25 DIAGNOSIS — R97.21 RISING PSA FOLLOWING TREATMENT FOR MALIGNANT NEOPLASM OF PROSTATE: ICD-10-CM

## 2020-03-25 PROCEDURE — 52000 CYSTOURETHROSCOPY: CPT | Performed by: UROLOGY

## 2020-03-25 PROCEDURE — 51798 US URINE CAPACITY MEASURE: CPT | Performed by: UROLOGY

## 2020-03-25 PROCEDURE — 99213 OFFICE O/P EST LOW 20 MIN: CPT | Performed by: UROLOGY

## 2020-03-25 PROCEDURE — G0463 HOSPITAL OUTPT CLINIC VISIT: HCPCS | Performed by: UROLOGY

## 2020-03-25 RX ORDER — CIPROFLOXACIN 500 MG/1
500 TABLET, FILM COATED ORAL ONCE
Status: COMPLETED | OUTPATIENT
Start: 2020-03-25 | End: 2020-03-25

## 2020-03-25 RX ADMIN — CIPROFLOXACIN 500 MG: 500 TABLET, FILM COATED ORAL at 16:37:00

## 2020-03-25 NOTE — PROGRESS NOTES
[facility], [facility] Urology  Follow-Up Visit    HPI: Ac Cohen is a 68year old male presents for a follow up visit. Patient was last seen on 2/26/2020.     INTERVAL HISTORY: Here for office cystourethroscopy for further evaluation of lower urinary tra Elects observation with repeat PSA in 3 months. 2. History of Urethral Stricture With LUTS  Diagnosed with a bulbar urethral stricture (2 cm distal to the sphincter) in 11/2015 at the time of attempted catheterization.  Patient underwent cystoscopy wi normal.   Abdominal:   Right inguinal herniorrhaphy scar. Genitourinary:    Genitourinary Comments: Right testicle surgically absent. MCKENZIE from 01/2020 revealed: prostate 1+ enlarged, firm and nodular on the right side (cT2b).       Neurological: He is al the posterior prostate bilaterally. Right significantly greater than left. This is nonspecific but given the focality and the rising PSA is concerning for recurrence. Further characterization with 3T prostate MRI is recommended.   3. Focally increased up recurrence. His PVR today was again within normal limits (31 mL's). I recommend continue medical therapy for BPH with monitoring of symptoms. Patient verbalized understanding.  He elects continued observation for the meanwhile with follow-up of his PSA

## 2020-04-23 ENCOUNTER — TELEPHONE (OUTPATIENT)
Dept: INTERNAL MEDICINE CLINIC | Facility: CLINIC | Age: 70
End: 2020-04-23

## 2020-04-23 ENCOUNTER — PATIENT MESSAGE (OUTPATIENT)
Dept: INTERNAL MEDICINE CLINIC | Facility: CLINIC | Age: 70
End: 2020-04-23

## 2020-04-23 NOTE — TELEPHONE ENCOUNTER
[facility] are up to date on your pneumonia shot. Pneumococcal (Prevnar 13)4/17/2017    Pneumovax 234/26/2018    Zoster Vaccine Live (Zostavax)4/17/2017      You can get the shingriz vaccine even though you already received the Zostavax.

## 2020-04-23 NOTE — TELEPHONE ENCOUNTER
[address] left on [place] and on phone that he can get the Shingrix vaccine even though he received the Zostavax vaccine.     Rubi Lim     You are up to date on your pneumonia shot.     Pneumococcal (Prevnar 13)4/17/2017

## 2020-04-23 NOTE — TELEPHONE ENCOUNTER
From: Dony Velez  To: Duane Marrero MD  Sent: 4/23/2020 11:05 AM CDT  Subject: Non-Urgent Medical Question    may I get shingles shot at my Pharmacy? Am I uptodate on my pneumonia shots? If not, can I get that shot at Pharmacy as well?   Aurora Reyna

## 2020-05-15 ENCOUNTER — TELEPHONE (OUTPATIENT)
Dept: INTERNAL MEDICINE CLINIC | Facility: CLINIC | Age: 70
End: 2020-05-15

## 2020-05-15 NOTE — TELEPHONE ENCOUNTER
RN pls call pt and pls triage, thanks.      Future Appointments   Date Time Provider Caroline Anguiano   6/15/2020  2:20 PM Isaiah Winslow MD [facility]

## 2020-05-15 NOTE — TELEPHONE ENCOUNTER
From: Dony Velez  To: Duane Marrero MD  Sent: 5/15/2020 11:08 AM CDT  Subject: Non-Urgent Medical Question    I am currently experiencing pain in my hips and the usual discomfort in my knees. I am taking 4-6 Tylenol per day with adequate results.

## 2020-05-15 NOTE — TELEPHONE ENCOUNTER
Action Requested: Summary for Provider     []  Critical Lab, Recommendations Needed  [] Need Additional Advice  []   FYI    []   Need Orders  [] Need Medications Sent to Pharmacy  []  Other     SUMMARY: just an fyi, offered an appt but pt declined at this

## 2020-05-29 ENCOUNTER — TELEPHONE (OUTPATIENT)
Dept: SURGERY | Facility: CLINIC | Age: 70
End: 2020-05-29

## 2020-05-29 DIAGNOSIS — M54.50 LOW BACK PAIN, UNSPECIFIED BACK PAIN LATERALITY, UNSPECIFIED CHRONICITY, UNSPECIFIED WHETHER SCIATICA PRESENT: ICD-10-CM

## 2020-05-29 DIAGNOSIS — R35.1 NOCTURIA: Primary | ICD-10-CM

## 2020-05-29 NOTE — TELEPHONE ENCOUNTER
S/W pt and determined that he is concerned about the pain that he is having in the bilateral groin/pelvic area when he pushes himself forward to get out of bed. States that this area also hurts when he is sitting or lying down for an extended period.  He is

## 2020-05-29 NOTE — TELEPHONE ENCOUNTER
Per pt, is having a few physical aches and pains and is wanting to speak to a nurse regarding this.  Please advise

## 2020-06-01 NOTE — TELEPHONE ENCOUNTER
Patient has appointment with me in the near future. We will discuss at that time. If he wishes, he can contact the office and we can try and get him in sooner.

## 2020-06-01 NOTE — TELEPHONE ENCOUNTER
S/W pt and informed him of the info and instructions in [facility] as stated below and pt verbalized understanding and compliance.

## 2020-06-01 NOTE — TELEPHONE ENCOUNTER
Symptoms seem more musculoskeletal in nature. He should contact Dr. Noelle Cárdenas regarding his symptoms. He should follow-up with me as scheduled, with a PSA blood test before office visit (ordered).  I would also have him submit a urine sample to rule out a

## 2020-06-02 ENCOUNTER — TELEPHONE (OUTPATIENT)
Dept: INTERNAL MEDICINE CLINIC | Facility: CLINIC | Age: 70
End: 2020-06-02

## 2020-06-02 DIAGNOSIS — E11.9 TYPE 2 DIABETES MELLITUS WITHOUT COMPLICATION, UNSPECIFIED WHETHER LONG TERM INSULIN USE (HCC): Primary | ICD-10-CM

## 2020-06-02 NOTE — TELEPHONE ENCOUNTER
Nancy Hannonley received lancets on 5/27 but did not receive script for lancets, requesting script for that as well. Patient is running out.

## 2020-06-05 ENCOUNTER — MED REC SCAN ONLY (OUTPATIENT)
Dept: INTERNAL MEDICINE CLINIC | Facility: CLINIC | Age: 70
End: 2020-06-05

## 2020-06-09 ENCOUNTER — LAB ENCOUNTER (OUTPATIENT)
Dept: LAB | Age: 70
End: 2020-06-09
Attending: NURSE PRACTITIONER
Payer: MEDICARE

## 2020-06-09 DIAGNOSIS — C61 MALIGNANT NEOPLASM OF PROSTATE (HCC): ICD-10-CM

## 2020-06-09 DIAGNOSIS — E11.9 TYPE 2 DIABETES MELLITUS WITHOUT COMPLICATION, WITHOUT LONG-TERM CURRENT USE OF INSULIN (HCC): ICD-10-CM

## 2020-06-09 DIAGNOSIS — E78.5 HYPERLIPIDEMIA, UNSPECIFIED HYPERLIPIDEMIA TYPE: ICD-10-CM

## 2020-06-09 DIAGNOSIS — I10 ESSENTIAL HYPERTENSION: ICD-10-CM

## 2020-06-09 DIAGNOSIS — R97.21 RISING PSA FOLLOWING TREATMENT FOR MALIGNANT NEOPLASM OF PROSTATE: ICD-10-CM

## 2020-06-09 PROCEDURE — 36415 COLL VENOUS BLD VENIPUNCTURE: CPT

## 2020-06-09 PROCEDURE — 84153 ASSAY OF PSA TOTAL: CPT

## 2020-06-09 PROCEDURE — 85025 COMPLETE CBC W/AUTO DIFF WBC: CPT

## 2020-06-09 PROCEDURE — 80061 LIPID PANEL: CPT

## 2020-06-09 PROCEDURE — 80053 COMPREHEN METABOLIC PANEL: CPT

## 2020-06-09 PROCEDURE — 83036 HEMOGLOBIN GLYCOSYLATED A1C: CPT

## 2020-06-09 PROCEDURE — 81003 URINALYSIS AUTO W/O SCOPE: CPT | Performed by: UROLOGY

## 2020-06-10 ENCOUNTER — TELEPHONE (OUTPATIENT)
Dept: INTERNAL MEDICINE CLINIC | Facility: CLINIC | Age: 70
End: 2020-06-10

## 2020-06-10 NOTE — TELEPHONE ENCOUNTER
R/S 6/15/20 APPT. VIRTUAL VISIT ONLY. IF PT HAS ISSUES TO ADDRESS, USE TIME SLOT, AND R/S MEDICARE ANNUAL.

## 2020-06-18 ENCOUNTER — OFFICE VISIT (OUTPATIENT)
Dept: SURGERY | Facility: CLINIC | Age: 70
End: 2020-06-18
Payer: MEDICARE

## 2020-06-18 VITALS
WEIGHT: 215 LBS | HEIGHT: 69 IN | BODY MASS INDEX: 31.84 KG/M2 | TEMPERATURE: 99 F | DIASTOLIC BLOOD PRESSURE: 72 MMHG | HEART RATE: 73 BPM | SYSTOLIC BLOOD PRESSURE: 134 MMHG

## 2020-06-18 DIAGNOSIS — R97.21 RISING PSA FOLLOWING TREATMENT FOR MALIGNANT NEOPLASM OF PROSTATE: ICD-10-CM

## 2020-06-18 DIAGNOSIS — R39.9 LOWER URINARY TRACT SYMPTOMS (LUTS): ICD-10-CM

## 2020-06-18 DIAGNOSIS — C61 MALIGNANT NEOPLASM OF PROSTATE (HCC): Primary | ICD-10-CM

## 2020-06-18 PROCEDURE — G0463 HOSPITAL OUTPT CLINIC VISIT: HCPCS | Performed by: UROLOGY

## 2020-06-18 PROCEDURE — 99214 OFFICE O/P EST MOD 30 MIN: CPT | Performed by: UROLOGY

## 2020-06-18 RX ORDER — BICALUTAMIDE 50 MG/1
50 TABLET, FILM COATED ORAL NIGHTLY
Qty: 21 TABLET | Refills: 0 | Status: SHIPPED | OUTPATIENT
Start: 2020-06-18 | End: 2020-01-01

## 2020-06-18 NOTE — PROGRESS NOTES
[facility] Urology  Follow-Up Visit    HPI: Piter Hudson is a 68year old male presents for a follow up visit. Patient was last seen on 3/25/2020. INTERVAL HISTORY: Here for follow-up.  Recent PSA from 6/2020 has risen to 12.4 ng/mL, up from 6 6/2020 F/U: PSA almost double to 12.4 ng/mL. Elected to start ADT; preferably intermittent ADT.  Started on bicalutamide 50 mg QD for 3 weeks in preparation for [facility] agonist.        2. History of Urethral Stricture With LUTS  Diagnosed with a bulbar urethra lb (97.5 kg)   BMI 31.75 kg/m²      Physical Exam    Constitutional: He is oriented to person, place, and time. He appears well-developed. HENT:   Head: Normocephalic. Eyes: Conjunctivae are normal.   Cardiovascular: Normal rate.     Pulmonary/Chest: Ef characterization with 3T prostate MRI is recommended. 3. Focally increased uptake is seen in the proximal rectum. This corresponds to a nondistended area of bowel.   Given that there are varying degrees of uptake in colonic polyps and colon cancer, colono Patient electing initiation of ADT with an LHRH agonist. Start bicalutamide 50 mg daily for 3 weeks. RTC in 3 weeks for administration of a 6 months depot of Eligard. 2. Continue Tamsulosin for management of LUTS.  May consider antimuscarinic down the l

## 2020-06-23 RX ORDER — ATORVASTATIN CALCIUM 40 MG/1
TABLET, FILM COATED ORAL
Qty: 90 TABLET | Refills: 0 | Status: SHIPPED | OUTPATIENT
Start: 2020-06-23 | End: 2020-01-01

## 2020-06-23 RX ORDER — METOPROLOL TARTRATE 100 MG/1
TABLET ORAL
Qty: 180 TABLET | Refills: 0 | Status: ON HOLD | OUTPATIENT
Start: 2020-06-23 | End: 2020-01-01

## 2020-06-23 RX ORDER — AMLODIPINE BESYLATE 10 MG/1
TABLET ORAL
Qty: 90 TABLET | Refills: 0 | Status: ON HOLD | OUTPATIENT
Start: 2020-06-23 | End: 2020-01-01

## 2020-07-09 ENCOUNTER — OFFICE VISIT (OUTPATIENT)
Dept: SURGERY | Facility: CLINIC | Age: 70
End: 2020-07-09
Payer: MEDICARE

## 2020-07-09 VITALS
BODY MASS INDEX: 31.84 KG/M2 | HEIGHT: 69 IN | HEART RATE: 67 BPM | TEMPERATURE: 98 F | DIASTOLIC BLOOD PRESSURE: 84 MMHG | SYSTOLIC BLOOD PRESSURE: 140 MMHG | RESPIRATION RATE: 16 BRPM | WEIGHT: 215 LBS

## 2020-07-09 DIAGNOSIS — R97.21 RISING PSA FOLLOWING TREATMENT FOR MALIGNANT NEOPLASM OF PROSTATE: ICD-10-CM

## 2020-07-09 DIAGNOSIS — Z79.818 ANDROGEN DEPRIVATION THERAPY: ICD-10-CM

## 2020-07-09 DIAGNOSIS — C61 MALIGNANT NEOPLASM OF PROSTATE (HCC): Primary | ICD-10-CM

## 2020-07-09 PROCEDURE — 96402 CHEMO HORMON ANTINEOPL SQ/IM: CPT | Performed by: UROLOGY

## 2020-07-09 PROCEDURE — G0463 HOSPITAL OUTPT CLINIC VISIT: HCPCS | Performed by: UROLOGY

## 2020-07-09 PROCEDURE — 99213 OFFICE O/P EST LOW 20 MIN: CPT | Performed by: UROLOGY

## 2020-07-09 NOTE — PROGRESS NOTES
[facility], [facility] Urology  Follow-Up Visit    HPI: Heather Castillo is a 68year old male presents for a follow up visit. Patient was last seen on 6/18/2020.     INTERVAL HISTORY: Here for an [facility] injection for his biochemically recurrent prostate cancer wi ADT; preferably intermittent ADT. Started on bicalutamide 50 mg QD for 3 weeks in preparation for [facility] agonist.      - 7/2020 F/U: Administered Eligard 22.5 mg 3 months Depo.        2. History of Urethral Stricture With LUTS  Diagnosed with a bulbar urethra (1.753 m)   Wt 215 lb (97.5 kg)   BMI 31.75 kg/m²      Physical Exam    Constitutional: He is oriented to person, place, and time. He appears well-developed. HENT:   Head: Normocephalic. Eyes: Conjunctivae are normal.   Cardiovascular: Normal rate. Focally increased uptake is seen in the proximal rectum. This corresponds to a nondistended area of bowel.   Given that there are varying degrees of uptake in colonic polyps and colon cancer, colonoscopy is recommended if one has not been performed recentl

## 2020-07-09 NOTE — PROGRESS NOTES
Reviewed patient's medial records, including Dr Kei Tristan's order and notes. Determined his last office visit and explained procedure to the patient. Verbalized understanding.      Assessed his level of awareness, knowledge about the procedure as well as t 18G needle to the end of Syringe B by holding the protective needle sheath and twisted the syringe clockwise to fully seat the needle. RN removed the protective sheath prior to administration.      RN chose an injection site on the left lower abdomen as agr

## 2020-08-05 ENCOUNTER — OFFICE VISIT (OUTPATIENT)
Dept: INTERNAL MEDICINE CLINIC | Facility: CLINIC | Age: 70
End: 2020-08-05
Payer: MEDICARE

## 2020-08-05 VITALS
HEIGHT: 69 IN | BODY MASS INDEX: 32.14 KG/M2 | WEIGHT: 217 LBS | DIASTOLIC BLOOD PRESSURE: 74 MMHG | HEART RATE: 72 BPM | SYSTOLIC BLOOD PRESSURE: 136 MMHG | RESPIRATION RATE: 20 BRPM

## 2020-08-05 DIAGNOSIS — C61 MALIGNANT NEOPLASM OF PROSTATE (HCC): ICD-10-CM

## 2020-08-05 DIAGNOSIS — I10 ESSENTIAL HYPERTENSION: ICD-10-CM

## 2020-08-05 DIAGNOSIS — K63.5 POLYP OF COLON, UNSPECIFIED PART OF COLON, UNSPECIFIED TYPE: ICD-10-CM

## 2020-08-05 DIAGNOSIS — I25.10 ATHEROSCLEROSIS OF NATIVE CORONARY ARTERY OF NATIVE HEART WITHOUT ANGINA PECTORIS: ICD-10-CM

## 2020-08-05 DIAGNOSIS — E78.5 HYPERLIPIDEMIA, UNSPECIFIED HYPERLIPIDEMIA TYPE: ICD-10-CM

## 2020-08-05 DIAGNOSIS — E11.9 TYPE 2 DIABETES MELLITUS WITHOUT COMPLICATION, UNSPECIFIED WHETHER LONG TERM INSULIN USE (HCC): ICD-10-CM

## 2020-08-05 DIAGNOSIS — M10.9 GOUT, UNSPECIFIED CAUSE, UNSPECIFIED CHRONICITY, UNSPECIFIED SITE: ICD-10-CM

## 2020-08-05 DIAGNOSIS — Z00.00 ENCOUNTER FOR ANNUAL HEALTH EXAMINATION: Primary | ICD-10-CM

## 2020-08-05 PROCEDURE — G0439 PPPS, SUBSEQ VISIT: HCPCS | Performed by: INTERNAL MEDICINE

## 2020-08-05 NOTE — PATIENT INSTRUCTIONS
London Watson's SCREENING SCHEDULE   Tests on this list are recommended by your physician but may not be covered, or covered at this frequency, by your insurer. Please check with your insurance carrier before scheduling to verify coverage.     PREVEN Covered every 10 years- more often if abnormal Colonoscopy due on 06/27/2023 Update Health Maintenance if applicable    Flex Sigmoidoscopy Screen  Covered every 5 years No results found for this or any previous visit. No flowsheet data found.      Fecal O orders found for this or any previous visit. This may be covered with your prescription benefits, but Medicare does not cover unless Medically needed    Zoster (Not covered by Medicare Part B) No orders found for this or any previous visit.  This may be cov

## 2020-08-05 NOTE — PROGRESS NOTES
HPI:   Maximiliano Davis is a 68year old male who presents for a Medicare Subsequent Annual Wellness visit (Pt already had Initial Annual Wellness). Patient is here for annual wellness visit and follow-up on chronic medical problems as listed below. has Vision problems based on screening of functional status. Vision Problems? : Yes   He has Walking problems based on screening of functional status.    Difficulty walking?: Yes(uses walking cane)       Depression Screening (PHQ-2/PHQ-9): Over the LAST 2 (97.5 kg)     Last Cholesterol Labs:   Lab Results   Component Value Date    CHOLEST 166 06/09/2020    HDL 46 06/09/2020    LDL 45 06/09/2020    TRIG 377 (H) 06/09/2020          Last Chemistry Labs:   Lab Results   Component Value Date    AST 12 (L) 06/09/ Acetate (3 Month) (ELIGARD) SC KIT 22.5 mg       MEDICAL INFORMATION:   He  has a past medical history of Ankle fracture, right (2008), Atherosclerosis of coronary artery, CAD (coronary artery disease), Prostate cancer [facility]) (May 2015), and Rheumatoid arth Screening Method:  Questionnaire  I have a problem hearing over the telephone:  No I have trouble following the conversations when two or more people are talking at the same time:  No   I have trouble understanding things on the TV:  No I have to strain present. Pulmonary/Chest: Effort normal and breath sounds normal. He has no wheezes. He has no rales. Abdominal: Soft. Bowel sounds are normal. He exhibits no mass. There is no tenderness.  Hernia confirmed negative in the right inguinal area and confirm unspecified hyperlipidemia type  Lipid profile look good except for elevated triglycerides. Continue same dose of atorvastatin. 5. Gout, unspecified cause, unspecified chronicity, unspecified site  No recent episodes. Continue current treatment.     6. Cancer Screening      Colonoscopy Screen every 10 years Colonoscopy due on 06/27/2023 Update Health Maintenance if applicable    Flex Sigmoidoscopy Screen every 10 years No results found for this or any previous visit. No flowsheet data found.      Fecal Oc Value   03/03/2016 4.1    No flowsheet data found. Creatinine  Annually Creatinine (mg/dL)   Date Value   06/09/2020 0.57 (L)    No flowsheet data found. Drug Serum Conc  Annually No results found for: DIGOXIN, DIG, VALP No flowsheet data found.

## 2020-09-08 PROBLEM — R07.9 CHEST PAIN: Status: ACTIVE | Noted: 2020-01-01

## 2020-09-08 PROBLEM — R07.9 CHEST PAIN, UNSPECIFIED TYPE: Status: ACTIVE | Noted: 2020-01-01

## 2020-09-08 NOTE — CONSULTS
[facility]    Report of Consultation    Rubi Lim Patient Status:  Observation    1950 MRN [id number]   Location [facility] 3W/SW Attending John Oglesby MD   Hosp Day # 0 PCP Marcell Julian MD     Date of Admiss undescended testicle       Family History  Family History   Problem Relation Age of Onset   • Heart Disease Mother         congenital heart disease (casue of death)   • Stroke Father         TIA   • Polyps Brother         colon       Social History  Social TABLET BY MOUTH TWICE DAILY WITH MEALS  METOPROLOL TARTRATE 100 MG Oral Tab, TAKE ONE TABLET BY MOUTH TWICE DAILY   AMLODIPINE BESYLATE 10 MG Oral Tab, TAKE ONE TABLET BY MOUTH ONE TIME DAILY   ATORVASTATIN 40 MG Oral Tab, TAKE ONE TABLET BY MOUTH ONE TIME No intake/output data recorded.    This shift: I/O this shift:  In: -   Out: 450 [Urine:450]     Vent Settings:      Hemodynamic parameters (last 24 hours):      Scheduled Meds:   • Heparin Sodium (Porcine)  5,000 Units Subcutaneous 2 times per day   • Insu TSH 1.290 10/12/2019    PSA 12.40 (H) 06/09/2020    TROP <0.045 09/08/2020    B12 368 05/10/2017         Imaging:  Xr Chest Ap Portable  (cpt=71045)    Result Date: 9/8/2020  CONCLUSION: Normal examination.      Dictated by (CST): Calvin Anna,

## 2020-09-08 NOTE — TELEPHONE ENCOUNTER
Action Requested: Summary for Provider     []  Critical Lab, Recommendations Needed  [] Need Additional Advice  []   FYI    []   Need Orders  [] Need Medications Sent to Pharmacy  []  Other     SUMMARY: pt going to ER for chest pain    Pt states has a card

## 2020-09-08 NOTE — ED NOTES
Orders for admission, patient is aware of plan and ready to go upstairs. Any questions, please call ED RN Fallon  at [address]    Type of COVID test sent:    COVID Suspicion level: Low    LOC at time of transport: A&Ox4    Other pertinent infor

## 2020-09-08 NOTE — PROCEDURES
[facility]  [facility] Cardiac Cath Procedure Note  Nicky Feliciano Patient Status:  Observation    1950 MRN [id number]   Location [facility] Attending Garcia Luther MD   Hosp Day # 0 PCP Angela Gruber MD maintained by RN and moderate conscious sedation of versed and fentanyl was given. Patient was assessed and monitoring of oxygen, heart rate and blood pressure by nurse and myself during the exam 24 minutes.       Nuhng Gentile MD  09/08/20

## 2020-09-08 NOTE — PLAN OF CARE
Problem: Patient Centered Care  Goal: Patient preferences are identified and integrated in the patient's plan of care  Description  Interventions:  - What would you like us to know as we care for you?  \"I feel the same way I did when I had my stent in 21 appropriate  Outcome: Progressing     Problem: RISK FOR INFECTION - ADULT  Goal: Absence of fever/infection during anticipated neutropenic period  Description  INTERVENTIONS  - Monitor WBC  - Administer growth factors as ordered  - Implement neutropenic gu Monitor vital signs, rhythm, and trends  - Monitor for bleeding, hypotension and signs of decreased cardiac output  - Evaluate effectiveness of vasoactive medications to optimize hemodynamic stability  - Monitor arterial and/or venous puncture sites for bl

## 2020-09-08 NOTE — ED PROVIDER NOTES
Patient Seen in: [facility] AND [facility] Emergency Department      History   Patient presents with:  Chest Pain Angina    Stated Complaint: Chest pain     HPI    68-year-old male with history of rheumatoid arthritis, prostate cancer, and coronary artery diseas Alcohol/week: 0.0 standard drinks      Comment: wine, 2-3 glasses weekly    Drug use: No             Review of Systems    Positive for stated complaint: Chest pain   Other systems are as noted in HPI. Constitutional and vital signs reviewed.       All othe -----------         ------                     CBC W/ DIFFERENTIAL[[id number]]          Abnormal            Final result                 Please view results for these tests on the individual orders.    RAINBOW DRAW BLUE   RAINBOW DRAW LAVENDER

## 2020-09-08 NOTE — CONSULTS
[facility] Cardiology Consult Note    Analia Hoskins Patient Status:  Emergency    1950 MRN [id number]   Location [address] Attending Cristy Zamora MD   Hosp Day # 0 PCP Dianelys Quiñones MD     68year old male, cons bilateral knees     Past Surgical History:   Procedure Laterality Date   • ANGIOPLASTY (CORONARY)  2004    and stent of LCx   • CARD ECHO STRESS ECHO/REST AND STRESS(CPT=93350/46978 DM 44019)  2011    per NG: normal stress echo, (CAD)   • HEAD SURGERY

## 2020-09-08 NOTE — H&P
[facility]    PATIENT'S NAME: Raul Cortes   ATTENDING PHYSICIAN: Osvaldo Marx MD   PATIENT ACCOUNT#:   [id number]    LOCATION:  [place] [address]  MEDICAL RECORD #:   [id number]       YOB: 1950  ADMISSION DATE:       09/0 patient said the discomfort was persistent going and coming back from work because he walks 3 blocks. For the last 2 days, this discomfort has been there, borderline and not going away, getting worse with physical activity.   It does not go away completely

## 2020-09-08 NOTE — PROGRESS NOTES
Misc. Note    Pt. Scheduled for CABG tomorrow with Dr. Joe Son and Dr. Mariposa Mahoney. Written and verbal info provided to pt. Regarding niecy op expectations with all questions answered. No visitors at bedside at this time. Orders entered/testing in progress.  [place]

## 2020-09-09 PROBLEM — I24.9 ACUTE CORONARY SYNDROME (HCC): Status: ACTIVE | Noted: 2020-01-01

## 2020-09-09 NOTE — OPERATIVE REPORT
[facility] OPERATING ROOM  Operative Note     [facility]   [facility] [id number] MRN [id number]   Admission Date 9/8/2020 Operation Date 9/9/2020   Attending Physician Bakari Watson MD Operating Physician Naomie Shen MD      Preoperati Loss: N/A  All cell saver was returned to patient and no blood products were given     Summary of Case: The patient was brought to the operating room and placed in the supine position.   Invasive monitoring lines were placed and general endotracheal anesthe Prolene.   The vein was trimmed to an appropriate length proximally and the proximal end suspended with a 6-0 Prolene suture.   The first OM was identified and dissected out.  An arteriotomy was created and extended with Clark scissors.  The vein was elke correct.  The patient tolerated the procedure well and was transported back to the ICU in stable but critical condition after passing bleeding trials.  Maribeth Mullins MD  9/9/2020  5:19 PM        *This note was dictated, in part, using a computerized

## 2020-09-09 NOTE — ANESTHESIA PREPROCEDURE EVALUATION
Anesthesia PreOp Note    HPI:     Pankaj Alaniz is a 68year old male who presents for preoperative consultation requested by: Iker Wilburn MD    Date of Surgery: 9/9/2020    Procedure(s):   HEART CORONARY ARTERY BYPASS GRAFT  Indication: Atheroscleros 2015; and brachytherapy in Dec 2015   • Rheumatoid arthritis ([facility] [address] [facility] [address]     bilateral knees       Past Surgical History:   Procedure Laterality Date   • ANGIOPLASTY (CORONARY)  2004    and stent of LCx   • CARD ECHO STRESS ECHO/REST AND STRESS(CPT=93350/50018 D , Rfl: , 9/8/2020 at 0900  Blood Glucose Monitoring Suppl Does not apply Device, Check Blood sugar daily E11.9  Dispense per INS coverage, Disp: 1 Device, Rfl: 0, Taking  Glucose Blood (BLOOD GLUCOSE TEST) In Vitro Strip, Check Blood sugar daily E11.9-  uQoc Quinones Or  traMADol HCl (ULTRAM) tab 100 mg, 100 mg, Oral, Q6H PRN, Laurel Thomas MD  mupirocin (BACTROBAN) 2% nasal ointment OINT 1 Application, 1 Application, Nasal, Now then every 0500, Angela Rodrigues, YONIS  melatonin tab TABS 3 mg, 3 mg, Oral, Nightly PRN file        Attends [employer] service: Not on file        Active member of club or organization: Not on file        Attends meetings of clubs or organizations: Not on file        Relationship status: Not on file      Intimate partner violence:        Fear Resp: 16 16     Temp: 97.9 °F (36.6 °C)      TempSrc: Oral      SpO2: 97% 96% 96% 96%   Weight:       Height:            Anesthesia Evaluation     Patient summary reviewed and Nursing notes reviewed    Airway   Mallampati: II  TM distance: >3 FB  Neck RO

## 2020-09-09 NOTE — ANESTHESIA PROCEDURE NOTES
Airway  Date/Time: 9/9/2020 12:33 PM  Urgency: elective    Airway not difficult    General Information and Staff    Patient location during procedure: OR  Anesthesiologist: Jack Stapleton MD  Performed: anesthesiologist     Indications and Patient Co

## 2020-09-09 NOTE — PROGRESS NOTES
[facility]  Hospitalist Progress Note     Camillo Bridegroom Patient Status:  Observation    1950  68year old CSN [id number]   Location [facility] OR [place] ROOMS/[facility] OR* Attending Bakari Watson MD   Hosp Day # 0 PCP Duane Marrero MD PTT 27.4       • docusate sodium  100 mg Oral BID   • Chlorhexidine Gluconate  15 mL Mouth/Throat [email or web address]   • [MAR Hold] hydrocortisone   Topical BID     [MAR Hold] metoprolol Tartrate, sodium chloride, sodium chloride, sodium chloride, gelatin ads

## 2020-09-09 NOTE — ANESTHESIA PROCEDURE NOTES
Central Line  Date/Time: 9/9/2020 12:40 PM  Performed by: Maritza Eisenberg MD  Authorized by: Maritza Eisenberg MD     General Information and Staff    Procedure Start:  9/9/2020 12:40 PM  Procedure End:  9/9/2020 12:50 PM  Anesthesiologist:  Jose Elias Zhong

## 2020-09-09 NOTE — ANESTHESIA POSTPROCEDURE EVALUATION
Patient: Germain Velazquez    Procedure Summary     Date:  09/09/20 Room / Location:  [address] [place] [address] MAIN OR 18 / [address] [place] [address] MAIN OR    Anesthesia Start:  3649 Anesthesia Stop:      Procedure:  HEART CORONARY ARTERY BYPASS GRAFT (N/A ) Diagnosis:       Atherosclerosis of n

## 2020-09-09 NOTE — CM/SW NOTE
Received MDO for Surgery. Per RN rounds, pt is scheduled for CABG today 9/9 at 1PM.    SW met w/ pt in his room prior to surgery. Pt verified his address and confirmed he will be living w/ his sister, Jackie Baird.  There is 1 level in the home and 4 steps to ent

## 2020-09-09 NOTE — ANESTHESIA POSTPROCEDURE EVALUATION
Patient: Ac Cohen    Procedure Summary     Date:  09/09/20 Room / Location:  [facility] OR  / [facility] OR    Anesthesia Start:  5944 Anesthesia Stop:      Procedure:  HEART CORONARY ARTERY BYPASS GRAFT (N/A ) Diagnosis:       Atherosclerosis of n

## 2020-09-09 NOTE — ANESTHESIA PROCEDURE NOTES
Procedure Performed: COLT      Start Time:  9/9/2020 12:55 PM       End Time:   9/9/2020 5:30 PM    Preanesthesia Checklist:  Patient identified, IV assessed, risks and benefits discussed, monitors and equipment assessed, procedure being performed at [facility]

## 2020-09-10 NOTE — PROGRESS NOTES
[facility]  [facility] Cardiology Progress Note    Yehuda Pearson Patient Status:  Inpatient    1950 MRN [id number]   Location [facility] 2W/SW Attending Twin Ruiz MD   Hosp Day # 1 PCP Zarina Henry MD     68year old male brachytherapy in Dec 2015   • Rheumatoid arthritis ([facility] [address]     bilateral knees     Past Surgical History:   Procedure Laterality Date   • ANGIOPLASTY (CORONARY)  2004    and stent of LCx   • CARD ECHO STRESS ECHO/REST AND STRESS(CPT=93350/25154 DM 48315)  2

## 2020-09-10 NOTE — CONSULTS
[facility]    Consult Note    Date:  9/9/2020  Date of Admission:  9/8/2020      Chief Complaint:   Federico Cason is a(n) 68year old male with coronary artery bypass grafting now immediately postop.     HPI:   The patient has a history colon     Social History:  Social History    Tobacco Use      Smoking status: Never Smoker      Smokeless tobacco: Never Used    Alcohol use:  Yes      Alcohol/week: 0.0 standard drinks      Comment: wine, 2-3 glasses weekly    Drug use: No    Allergies/Med percussion. Cardiac regular rate and rhythm no murmur. Abdomen nontender, without hepatosplenomegaly and no mass appreciable. Extremities without clubbing cyanosis nor edema. Neurologic with symmetric tone and reflex. Sedated on ventilator.   Skin w

## 2020-09-10 NOTE — DIETARY NOTE
Nutrition Education Defer Note     Consult received for diet education per protocol. Education deferred until s/p intervention and when appropriate for teaching. Patient/family visited.  Nutrition care discussed/handout provided on what to except after

## 2020-09-10 NOTE — PROGRESS NOTES
[facility]    Progress Note      Assessment and Plan:   1. Status post coronary artery bypass grafting postoperative day #1– uneventful intraoperative course. Left ventricular function acceptable.   Tolerated extubation without event.

## 2020-09-10 NOTE — CM/SW NOTE
MDO for Home Health Eval:     Pt is POD #1 s/p CABG X 3. Patient lives alone, independent with ADLs, however he will be staying with his sister after discharge. Pt is agreeable to [place] referral to [facility].   Referral called to [address]    Social

## 2020-09-10 NOTE — PLAN OF CARE
Problem: Patient Centered Care  Goal: Patient preferences are identified and integrated in the patient's plan of care  Description  Interventions:  - What would you like us to know as we care for you?  \"I feel the same way I did when I had my stent in 21 appropriate  Outcome: Progressing  Note:   C/o mid incisional pain. PRN morphine given. See MAR for coverage given.       Problem: RISK FOR INFECTION - ADULT  Goal: Absence of fever/infection during anticipated neutropenic period  Description  INTERVENTIONS services based on physician/LIP order or complex needs related to functional status, cognitive ability or social support system  Outcome: Progressing     Problem: CARDIOVASCULAR - ADULT  Goal: Maintains optimal cardiac output and hemodynamic stability  Kevan mentation and behavior  - Position to facilitate oxygenation and minimize respiratory effort  - Oxygen supplementation based on oxygen saturation or ABGs  - Provide Smoking Cessation handout, if applicable  - Encourage broncho-pulmonary hygiene including c

## 2020-09-10 NOTE — RESPIRATORY THERAPY NOTE
Patient placed on CPAP trial @ 0040. Spontaneous parameters as follow RR:10, VE: 9.1, VT: 840, VC: 1650, NIF: -20, RSBI: 84.   Patient was able to follow commands and pass weaning trial. proceeded with extubation @ 0130.  Patient currently on 5LPM NC. RT wi

## 2020-09-10 NOTE — PROGRESS NOTES
[facility]    Progress Note    Liban Pineda Patient Status:  Inpatient    1950 MRN [id number]   Location [facility] 2W/SW Attending Phoenix Hickman MD   Hosp Day # 1 PCP Lili Marrero MD       Subjective:   Ashu Butcher Topical BID       Continuous Infusions:   • sodium chloride 83 mL/hr (09/09/20 1007)   • Dextrose-NaCl 42 mL/hr (09/10/20 0700)   • DOBUTamine Stopped (09/10/20 0600)   • Nitroglycerin in D5W     • norepinephrine Stopped (09/10/20 0730)   • nitroprusside ( 05/10/2017       Us Carotid Doppler Bilat - Diag Img (cpt=93880)    Result Date: 9/9/2020  CONCLUSION: No evidence of bilateral hemodynamically significant stenosis.     Dictated by (CST): Kb Ahumada MD on 9/09/2020 at 10:40 AM     Finalized by (CST) Nonspecific ST-T wave changes ABNORMAL When compared with ECG of 03/04/2017 19:11:11 No significant change Electronically signed on 09/08/2020 at 10:49 by Delfino Guy DO    Assessment and Plan:     Coronary artery disease s/p CABG x 3 with LIMA to LAD

## 2020-09-11 NOTE — CARDIAC REHAB
Cardiac Rehab Phase I    Activity:   Chair: Yes   Ambulation: Yes   Assistive Device: Wakler   Distance: 200 feet   Assistance needed: Minimal   Patient tolerated activity: Well. Shower Date:  Tolerated Shower Activity .     Education:  Handouts provided

## 2020-09-11 NOTE — PROGRESS NOTES
[facility]    Cardiology Progress Note  [facility]    Boaz Sharif Patient Status:  Inpatient    1950 MRN [id number]   Location [facility] 2W/SW Attending Humera Montague MD   Hosp Day # 2 PCP 140.4 -- --    Volume (mL) Norepinephrine 63.8 4.8 --    Volume (mL) (0.9% NaCl infusion) 2000 -- --    Volume (mL) (dextrose 5 % and 0.9 % NaCl infusion) 471.8 483.4 --    Blood  600  --  --    Cell Saver Blood Products 600 -- --    NG/GT  0  --  --    In Gluconate  15 mL Mouth/Throat [email or web address]   • [MAR Hold] hydrocortisone   Topical BID       Continuous Infusions:   • Nitroglycerin in D5W     • nitroprusside (NIPRIDE) 50 mg in D5W infusion     • amiodarone 0.5 mg/min (09/10/20 0700)   • sodium chloride MD on 9/09/2020 at 10:40 AM     Finalized by (CST): Nick Templeton MD on 9/09/2020 at 10:42 AM          Xr Chest Ap Portable  (cpt=71045)    Result Date: 9/10/2020  CONCLUSION:  1.  Negative postop chest.    Dictated by (CST): Nakia Long MD

## 2020-09-11 NOTE — HOME CARE LIAISON
Received referral from [address] Spoke with patient, confirmed agreeable to [address] [place] [address] Safia Abdi, pending orders. All questions addressed and answered. Patient will need a face to face entered prior to discharge.

## 2020-09-11 NOTE — PLAN OF CARE
POD 2 - CABG x 3 by Dr. Aaliyah Wood  A/Ox4. Follows commands. MCMULLEN equally. Neuro intact. On 2L NC. SpO2: 94-95%. Diminished/Clear breath sounds. IS: 1000. Chest tube on low continuous suction. Minimal serosanguinous output. BP range: 120-130's. HR: 60-70's.  SR on level or patient's stated pain goal  Description  INTERVENTIONS:  - Encourage pt to monitor pain and request assistance  - Assess pain using appropriate pain scale  - Administer analgesics based on type and severity of pain and evaluate response  - Impleme etc)  - Arrange for interpreters to assist at discharge as needed  - Consider post-discharge preferences of patient/family/discharge partner  - Complete POLST form as appropriate  - Assess patient's ability to be responsible for managing their own health Smoking Cessation handout, if applicable  - Encourage broncho-pulmonary hygiene including cough, deep breathe, Incentive Spirometry  - Assess the need for suctioning and perform as needed  - Assess and instruct to report SOB or any respiratory difficulty

## 2020-09-11 NOTE — TELEPHONE ENCOUNTER
Elly Hwang, would like to confirm that Dr. Raine Qureshi will manage and sign orders for Home Health.  Per Elly Hwang, this is a confidential line and it is ok to leave a verbal.

## 2020-09-11 NOTE — PROGRESS NOTES
[facility]    Progress Note    Terri Mervin Patient Status:  Inpatient    1950 MRN [id number]   Location [facility] 2W/SW Attending Brian Dunn, [address] [facility] [address] Day # 2 PCP Shayne Bamberger, MD       Subjective:   No acu insulin detemir  45 Units Subcutaneous Daily   • Insulin Aspart Pen  8 Units Subcutaneous TID CC   • Dorzolamide HCl  1 drop Both Eyes BID   • tamsulosin HCl  0.4 mg Oral Daily   • Insulin Aspart Pen  1-5 Units Subcutaneous TID CC   • metoprolol tartrate 35.6 34.3 34.8 34.9   RDW 14.4 14.4 14.5 14.6   NEPRELIM 4.81 5.53  --  9.26*   WBC 6.7 7.2 9.9 10.2   .0 151.0 101.0* 131.0*     Recent Labs   Lab 09/08/20  1201  09/09/20  1645 09/10/20  0434 09/11/20  0617   GLU  --    < > 114* 124* 202*   BUN  - 8:00 AM          Xr Chest Ap Portable  (cpt=71045)    Result Date: 9/9/2020  CONCLUSION:  1. Post CABG. Satisfactory position of lines and tubes. 2.  Expected postprocedural changes are present:  Trace left basal pleural effusion.   Trace atelectasis in t

## 2020-09-11 NOTE — PHYSICAL THERAPY NOTE
PHYSICAL THERAPY EVALUATION - INPATIENT     Room Number: 229/229-A  Evaluation Date: 9/11/2020  Type of Evaluation: Initial   Physician Order: PT Eval and Treat    Presenting Problem: CABG x 3 on 9/9  Reason for Therapy: Mobility Dysfunction and Discharge Treatment Plan: Bed mobility; Body mechanics; Endurance; Energy conservation;Patient education; Family education;Gait training;Range of motion;Strengthening;Stoop training;Stair training;Transfer training;Balance training  Rehab Potential : Good  Frequency (Ob walker;Cane(cane occasionally, doesn't use RW)  Patient Regularly Uses: None    Prior Level of [place]: Ind in ADL's and IADL's (-) driving.      SUBJECTIVE  \"I walk to work- it's about 3 blocks\"    PHYSICAL THERAPY EXAMINATION     OBJECTIVE  Marion Majano assist  Distance (ft): 125ft  Assistive Device: Rolling walker  Pattern: (decreased mario alberto speed)  Stoop/Curb Assistance: Not tested     Exercise/Education Provided:  Bed mobility  Body mechanics  Energy conservation  Functional activity tolerated  Gait t

## 2020-09-11 NOTE — PROGRESS NOTES
[facility]    Progress Note    Lacretia Market Patient Status:  Inpatient    1950 MRN [id number]   Location [facility] 2W/SW Attending Dallas Bass MD   Hosp Day # 2 PCP Kristofer Rogers MD        Subjective:     C now           Results:     Lab Results   Component Value Date    WBC 10.2 09/10/2020    HGB 11.1 (L) 09/10/2020    HCT 31.8 (L) 09/10/2020    .0 (L) 09/10/2020    CREATSERUM 0.65 (L) 09/11/2020    BUN 11 09/11/2020     09/11/2020    K 3.8 09/1

## 2020-09-11 NOTE — PROGRESS NOTES
[facility]    Progress Note    Analia Hoskins Patient Status:  Inpatient    1950 MRN [id number]   Location [facility] 2W/SW Attending Neville Iniguez MD   Hosp Day # 2 PCP Dianelys Quiñones MD       Subjective:   Brenda Freeman Amiodarone HCl  200 mg Oral TID CC   • Neostigmine Methylsulfate  3 mg Intravenous Once   • glycopyrrolate  0.6 mg Intravenous Once   • Chlorhexidine Gluconate  15 mL Mouth/Throat [email or web address]   • [MAR Hold] hydrocortisone   Topical BID       Continuous I (hxv=37468)    Result Date: 9/9/2020  CONCLUSION: No evidence of bilateral hemodynamically significant stenosis.     Dictated by (CST): Ana Garcia MD on 9/09/2020 at 10:40 AM     Finalized by (CST): Ana Garcia MD on 9/09/2020 at 10:42 AM health care and assistance from family. Ready in next 24-48 hours with continued recovery.     TIMO Kelly  9/10/2020    *Patient examination and plan of care discussed with rounding CV surgeon*    *This note was dictated, in part, using a computeri

## 2020-09-11 NOTE — PROGRESS NOTES
[facility] Note: Route Optimization for Famotidine (PEPCID)    Patient is currently on Famotidine (PEPCID) 20 mg IV every 12 hours.    The patient meets the criteria to convert to the oral equivalent as established by the IV to Oral conversion protocol ap

## 2020-09-12 NOTE — PROGRESS NOTES
[facility]     [facility] Cardiology Progress Note    [facility] Patient Status:  Inpatient    1950 MRN [id number]   Location [facility] 2W/SW Attending Bobby Staley MD   Hosp Day # 3 PCP MD Abraham Morgan D5W infusion     • amiodarone 0.5 mg/min (09/10/20 0700)   • sodium chloride     • sodium chloride     • sodium chloride         Labs:  Recent Labs   Lab 09/10/20  0434 09/12/20  0418   RBC 3.68* 3.15*   HGB 11.1* 9.3*   HCT 31.8* 28.2*   MCV 86.4 89.5   M mobility     HLD: cont atorvastatin     Anemia: drop in Hgb this AM, no signs of blood loss. Monitor with repeat cbc this afternoon.     Tara Ellison MD  [facility] Cardiology  9/12/2020  6:48 AM

## 2020-09-12 NOTE — PLAN OF CARE
POD 3 - CABG x 3.  A/Ox4, follows commands. MCMULLEN equally. Neuro intact. On 2L NC overnight with SpO2: 93-96%. On RA - 88-92%. IS done: 1000 with goal of 2000. Diminished lung sounds. BP and HR stable. SR on monitor. Pacer wires capped. Cardiac/DM diet.  Accu evaluate response  - Implement non-pharmacological measures as appropriate and evaluate response  - Consider cultural and social influences on pain and pain management  - Manage/alleviate anxiety  - Utilize distraction and/or relaxation techniques  - Monit managing their own health  - Refer to Case Management Department for coordinating discharge planning if the patient needs post-hospital services based on physician/LIP order or complex needs related to functional status, cognitive ability or social support respiratory difficulty  - Respiratory Therapy support as indicated  - Manage/alleviate anxiety  - Monitor for signs/symptoms of CO2 retention  Outcome: Progressing

## 2020-09-12 NOTE — PROGRESS NOTES
[facility]    Progress Note    Germain Velazquez Patient Status:  Inpatient    1950 MRN [id number]   Location [facility] 2W/SW Attending Earnestine Thompson MD   Hosp Day # 3 PCP Niki Carvajal MD       Subjective:   Salvador Payne Pet tone.  Psychiatric: calm  Sternum Incision dressing C/D/I; Left leg incision dressing C/D/I  Pulmonary:  Diminished at the bases  Cardiovascular: S1, S2 normal, no murmur, click, rub or gallop, regular rate and rhythm  Abdominal: soft, non-tender; bowel so 9/12/2020  CONCLUSION:  1. Status post coronary bypass. Normal heart size and pulmonary vascularity. Chest tubes and Ariton-David catheter have been removed. No pneumothorax. Moderate left base consolidation/atelectasis/effusion.   Minimal right basal atel

## 2020-09-12 NOTE — OCCUPATIONAL THERAPY NOTE
OCCUPATIONAL THERAPY EVALUATION - INPATIENT     Room Number: 229/229-A  Evaluation Date: 9/12/2020  Type of Evaluation: Initial  Presenting Problem: s/p CABG x3    Physician Order: IP Consult to Occupational Therapy  Reason for Therapy: ADL/IADL Dysfunctio simplification techniques;ADL training;Functional transfer training;Patient/Family education; Compensatory technique education       OCCUPATIONAL THERAPY MEDICAL/SOCIAL HISTORY     Problem List  Principal Problem:    Chest pain, unspecified type  Active Pro [place]    Prior Level of Andrew: Pt lives alone in a 1 level home. He plans to stay with his sister. He reports independence with ADLs and ambulation prior to admit. SUBJECTIVE  \"thank you for the encouragement. \"    OCCUPATIONAL THERAPY EXAMINATI Mobility: SBA with rW    BALANCE ASSESSMENT  Static Sitting: good  Dynamic Sitting: good  Static Standing: fair  Dynamic Standing: fair    FUNCTIONAL ADL ASSESSMENT  Grooming: SBA  Feeding: ind  Bathing: NT  Toileting: NT  Upper Extremity Dressing: verball

## 2020-09-12 NOTE — PROGRESS NOTES
[facility]    Progress Note    Domi Clipper Patient Status:  Inpatient    1950 MRN [id number]   Location [facility] 2W/SW Attending Elena Moffett MD   Hosp Day # 3 PCP Adonay March MD       Subjective:   No acute even CC   • Dorzolamide HCl  1 drop Both Eyes BID   • tamsulosin HCl  0.4 mg Oral Daily   • Insulin Aspart Pen  1-5 Units Subcutaneous TID CC   • metoprolol tartrate  25 mg Oral 2x Daily(Beta Blocker)   • docusate sodium  100 mg Oral BID   • famoTIDine  20 mg O Labs   Lab 09/08/20  1201  09/10/20  0434 09/11/20  0617 09/12/20  0418   GLU  --    < > 124* 202* 109*   BUN  --    < > 16 11 17   CREATSERUM  --    < > 0.67* 0.65* 0.54*   GFRAA  --    < > 112 113 122   GFRNAA  --    < > 97 98 106   CA  --    < > 8.7 8.5 (CST): Seema Sanchez MD on 9/09/2020 at 10:40 AM     Finalized by (CST): Seema Sanchez MD on 9/09/2020 at 10:42 AM          Xr Chest Ap Portable  (cpt=71045)    Result Date: 9/10/2020  CONCLUSION:  1.  Negative postop chest.    Dictated by (CST): Pa

## 2020-09-12 NOTE — PLAN OF CARE
Problem: Patient Centered Care  Goal: Patient preferences are identified and integrated in the patient's plan of care  Description  Interventions:  - What would you like us to know as we care for you?  \"I feel the same way I did when I had my stent in 21 appropriate  Outcome: Progressing     Problem: RISK FOR INFECTION - ADULT  Goal: Absence of fever/infection during anticipated neutropenic period  Description  INTERVENTIONS  - Monitor WBC  - Administer growth factors as ordered  - Implement neutropenic gu Monitor vital signs, rhythm, and trends  - Monitor for bleeding, hypotension and signs of decreased cardiac output  - Evaluate effectiveness of vasoactive medications to optimize hemodynamic stability  - Monitor arterial and/or venous puncture sites for bl the lowest position; call light is within reach. Sister will be involved in her care.

## 2020-09-12 NOTE — PROGRESS NOTES
[place] [facility]    Progress Note    Maximiliano Davis Patient Status:  Inpatient    1950 MRN [id number]   Location [facility] 2W/SW Attending Arielle Mason MD   Hosp Day # 3 PCP Don Leon MD        Subjective:     Constitu Results:     Lab Results   Component Value Date    WBC 7.4 09/12/2020    HGB 9.3 (L) 09/12/2020    HCT 28.2 (L) 09/12/2020    .0 (L) 09/12/2020    CREATSERUM 0.54 (L) 09/12/2020    BUN 17 09/12/2020     09/12/2020    K 4.0 09/12/2020

## 2020-09-13 NOTE — PLAN OF CARE
Provided showering education, reviewed discharge instructions and discharge video website. Verbalized understanding.

## 2020-09-13 NOTE — PLAN OF CARE
Pt a/o x 3; on room air; up with minimal assist; good urine output; shower today; discharge home today or tomorrow; accuchecks; sternal dsg d&I; norco given for pain; L leg incision open to air, some bruising noted to upper inner thigh; PW capped; will con FALL  Goal: Free from fall injury  Description  INTERVENTIONS:  - Assess pt frequently for physical needs  - Identify cognitive and physical deficits and behaviors that affect risk of falls.   - Clyde fall precautions as indicated by assessment.  - Educ and temperature  - Assess for signs of decreased coronary artery perfusion - ex.  Angina  - Evaluate fluid balance, assess for edema, trend weights  Outcome: Progressing     Problem: Diabetes/Glucose Control  Goal: Glucose maintained within prescribed range

## 2020-09-13 NOTE — PROGRESS NOTES
[place] [facility]    Progress Note    Erica Arredondo Patient Status:  Inpatient    1950 MRN [id number]   Location [facility] 2W/SW Attending Ashley Bowie MD   [address] Day # 4 PCP Chela Hu MD       Subjective:   No acute even Subcutaneous TID CC   • Dorzolamide HCl  1 drop Both Eyes BID   • tamsulosin HCl  0.4 mg Oral Daily   • Insulin Aspart Pen  1-5 Units Subcutaneous TID CC   • metoprolol tartrate  25 mg Oral 2x Daily(Beta Blocker)   • docusate sodium  100 mg Oral BID   • fa interval not displayed.      Recent Labs   Lab 09/08/20  1201  09/11/20  0617 09/12/20  0418 09/13/20  0332   GLU  --    < > 202* 109* 144*   BUN  --    < > 11 17 13   CREATSERUM  --    < > 0.65* 0.54* 0.58*   GFRAA  --    < > 113 122 119   GFRNAA  --    < significant stenosis. Dictated by (CST): Escobar Ponce MD on 9/09/2020 at 10:40 AM     Finalized by (CST): Escobar Ponce MD on 9/09/2020 at 10:42 AM          Xr Chest Ap Portable  (cpt=71045)    Result Date: 9/10/2020  CONCLUSION:  1.  Negative p

## 2020-09-13 NOTE — PROGRESS NOTES
[place] [facility]    Progress Note    Surekha Benitez Patient Status:  Inpatient    1950 MRN [id number]   Location [facility] 2W/SW Attending Brianna Daniel MD   [address] Day # 4 PCP Bhakti Wang MD        Subjective:     Constitu 09/13/2020    .0 (L) 09/13/2020    CREATSERUM 0.58 (L) 09/13/2020    BUN 13 09/13/2020     09/13/2020    K 5.1 09/13/2020     09/13/2020    CO2 33.0 (H) 09/13/2020     (H) 09/13/2020    CA 9.4 09/13/2020    ALB 3.8 09/08/2020    A

## 2020-09-13 NOTE — PROGRESS NOTES
[facility]    Progress Note    Shivani Brain Patient Status:  Inpatient    1950 MRN [id number]   Location [facility] 2W/SW Attending Reymundo Luna MD   [facility] Day # 4 PCP Misty Marquez MD       Subjective:   Andreina Miguel Pet oriented X 3, normal strength and tone.   Psychiatric: calm  Sternum Incision dressing C/D/I; Left leg incision dressing C/D/I  Pulmonary:  Diminished at the bases  Cardiovascular: S1, S2 normal, no murmur, click, rub or gallop, regular rate and rhythm  Abd Normal heart size and pulmonary vascularity. Chest tubes and [place]-David catheter have been removed. No pneumothorax. Moderate left base consolidation/atelectasis/effusion.   Minimal right basal atelectasis with blunting of the posterior right costophreni

## 2020-09-13 NOTE — OCCUPATIONAL THERAPY NOTE
OCCUPATIONAL THERAPY TREATMENT NOTE - INPATIENT    Room Number: 600/217-E         Presenting Problem: s/p CABG x3     Problem List  Principal Problem:    Chest pain, unspecified type  Active Problems:    Hyperlipidemia    Malignant neoplasm of prostate (HC education; Compensatory technique education    SUBJECTIVE  Patient is pleasant and cooperative    OBJECTIVE  Precautions: Sternal;Cardiac    WEIGHT BEARING RESTRICTION  Weight Bearing Restriction: None        PAIN ASSESSMENT  Ratin  Location: chest  Man 9/26/20  Frequency: 3-5x/week    [address]   #[id number]

## 2020-09-13 NOTE — PHYSICAL THERAPY NOTE
PHYSICAL THERAPY TREATMENT NOTE - INPATIENT     Room Number: 229/229-A       Presenting Problem: CABG x 3 on 9/9    Problem List  Principal Problem:    Chest pain, unspecified type  Active Problems:    Hyperlipidemia    Malignant neoplasm of prostate ([facility] [address] DISCHARGE RECOMMENDATIONS  PT Discharge Recommendations: 24 hour care/supervision;Home with home health PT     PLAN  PT Treatment Plan: Bed mobility; Body mechanics; Patient education;Gait training;Stair training;Transfer training;Balance training    SUB 9/18/2020  Patient Goal Patient's self-stated goal is: return to PLOF   Goal #1 Patient is able to demonstrate supine - sit EOB @ level: independent      Goal #1   Current Status Goal met    Goal #2 Patient is able to demonstrate transfers Sit to/from Renee Amador

## 2020-09-13 NOTE — PROGRESS NOTES
[facility]     [facility] Cardiology Progress Note    Raul Spring Patient Status:  Inpatient    1950 MRN [id number]   Location [facility] 2W/SW Attending Bri Mohr MD   [facility] Day # 4 PCP Brandin Rosa MD     Aakash in D5W     • nitroprusside (NIPRIDE) 50 mg in D5W infusion     • amiodarone 0.5 mg/min (09/10/20 0700)   • sodium chloride     • sodium chloride     • sodium chloride         Labs:  Recent Labs   Lab 09/10/20  0434  09/13/20  0332   RBC 3.68*   < > 3.43* consolidation/atelectasis/effusion.   Minimal right basal atelectasis with blunting   of the posterior right costophrenic angle.     Assessment and Plan:      ACS, found to have multivessel CAD now s/p 3v CABG 9/9: doing well   - echo with preserved LVEF  -

## 2020-09-14 NOTE — CARDIAC REHAB
Cardiac Rehab Phase I    Activity:   Chair: Yes   Ambulation: Yes   Assistive Device: Walker   Distance: 300 feet   Assistance needed: No   Patient tolerated activity: Well. Shower Date:  Tolerated Shower Activity .     Education:  Handouts provided and r

## 2020-09-14 NOTE — PHYSICAL THERAPY NOTE
Attempted PT treatment this a.m.- patient just completed a walk with RN. Per RN, Bebe Gamez, patient Mod I with utilization of RW with ambulation Spoke to patient who verbalized he had no concerns on this date.  \"I practiced everything yesterday with the PT, even

## 2020-09-14 NOTE — PROGRESS NOTES
[facility]    Progress Note    Falguni Mis Patient Status:  Inpatient    1950 MRN [id number]   Location [facility] 2W/SW Attending Royce Gonzales MD   Hosp Day # 5 PCP Roxy Garcia MD         Assessment and Plan: HCl  0.4 mg Oral Daily   • Insulin Aspart Pen  1-5 Units Subcutaneous TID CC   • metoprolol tartrate  25 mg Oral 2x Daily(Beta Blocker)   • docusate sodium  100 mg Oral BID   • famoTIDine  20 mg Oral BID   • multivitamin  1 tablet Oral Daily   • Vitamin C

## 2020-09-14 NOTE — PLAN OF CARE
VSS. A/Ox4. Mild pain on incision site with movements. Denies pain med. Good UO. No new issues and complaints. Will cont to monitor.        Problem: Patient Centered Care  Goal: Patient preferences are identified and integrated in the patient's plan of ca if interventions unsuccessful or patient reports new pain  - Anticipate increased pain with activity and pre-medicate as appropriate  Outcome: Progressing     Problem: RISK FOR INFECTION - ADULT  Goal: Absence of fever/infection during anticipated neutrope CARDIOVASCULAR - ADULT  Goal: Maintains optimal cardiac output and hemodynamic stability  Description  INTERVENTIONS:  - Monitor vital signs, rhythm, and trends  - Monitor for bleeding, hypotension and signs of decreased cardiac output  - Evaluate effectiv

## 2020-09-14 NOTE — PLAN OF CARE
Discharge education provided to patient & sister at bedside. All questions answered, verbalized understanding. Prescriptions provided: pepcid, multivitamin, vitamin C, metoprolol, norco, clopidogrel.        1850: Metoprolol tartrate prescription found o

## 2020-09-14 NOTE — PROGRESS NOTES
[facility]    Progress Note    Dalila Vance Patient Status:  Inpatient    1950 MRN [id number]   Location [facility] 2W/SW Attending Nhi Hooks MD   Hosp Day # 5 PCP María Sierra MD     Subjective:  Eber Sunshine 3 POD # 5  Encourage pulm toilet: IS- able to perform approx 1500cc w/IS  Pain meds as needed  Increase activity- oob to chair and ambulate in hallway.  Showered yesterday  Scds and Lovenox prophylaxis DVT prevention   Expected post op anemia: mild/stable-

## 2020-09-14 NOTE — CM/SW NOTE
Received HH orders. Discharge planned for today. Notified Siobhan foss/ [address], F2F complete. Confirmed plan w/ Augusto Ordoñez RN & patient.     Radha Funes, [address]

## 2020-09-14 NOTE — PLAN OF CARE
Problem: Patient Centered Care  Goal: Patient preferences are identified and integrated in the patient's plan of care  Description  Interventions:  - What would you like us to know as we care for you?  \"I feel the same way I did when I had my stent in 21 pre-medicate as appropriate  Outcome: Adequate for Discharge     Problem: RISK FOR INFECTION - ADULT  Goal: Absence of fever/infection during anticipated neutropenic period  Description  INTERVENTIONS  - Monitor WBC  - Administer growth factors as ordered and hemodynamic stability  Description  INTERVENTIONS:  - Monitor vital signs, rhythm, and trends  - Monitor for bleeding, hypotension and signs of decreased cardiac output  - Evaluate effectiveness of vasoactive medications to optimize hemodynamic stabili

## 2020-09-14 NOTE — DISCHARGE SUMMARY
[facility] [place]    Discharge Summary    Rodney Alvarez Patient Status:  Inpatient    1950 MRN [id number]   Location [facility] 2W/SW Attending Ronold Castleman, MD   Hosp Day # 5 PCP Shannan Gerber MD     Date of Admission: chest discomfort for the last 2 days. CBC and chemistry unremarkable. Troponin was negative. Chest x-ray showed no acute findings. The patient will be admitted to the hospital for further management and observation.      Hospital Course:   # Acute coron (25 mg total) by mouth 2x Daily(Beta Blocker). Stop taking on:  October 14, 2020  Quantity:  60 tablet  Refills:  0        CONTINUE taking these medications      Instructions Prescription details   aspirin 81 MG Tabs      Take 81 mg by mouth daily.    Ref Tabs  · metoprolol Tartrate 25 MG Tabs  · multivitamin Tabs         Follow up Visits:  Follow-up with pcp in 1 week    Follow up Labs: accuchecks    Other Discharge Instructions: f/u with CV surgery and cardiology as instructed    Guillermo Everett MD  9/14/2

## 2020-09-15 NOTE — PROGRESS NOTES
Initial Post Discharge Follow Up   Discharge Date: 9/14/20  Contact Date: 9/15/2020    Consent Verification:  Assessment Completed With: Patient  HIPAA Verified?   Yes    Discharge Dx:   Chest Pain        General:   • How have you been since your dischar daily. 90 tablet 0   • famoTIDine 20 MG Oral Tab Take 1 tablet (20 mg total) by mouth 2 (two) times daily. 60 tablet 0   • multivitamin Oral Tab Take 1 tablet by mouth daily.  30 tablet 0   • Vitamin C 500 MG Oral Tab Take 1 tablet (500 mg total) by mouth 3 prescribed? No  Are you having any concerns with constipation? No    Referrals/orders at D/C:  Home Health/Services ordered at D/C? Yes   What services:   Rehab, CHF, Stroke, PT, OT, Speech, Other: [address] [place] [address]  ([facility]) (If HH was ordered) Has HH been set up?   Yes [address]  [address] [address] [address] [address]  [address] [facility] [address]  [phone] [facility], [address] [facility] confirmed his appt with PCP for 9/30/2020. He denied having any questions or concerns at this time. He states that he is very grateful with the care that he received.      CCM referral placed:  No      [x]  Discharge Summary, Discharge medications reviewed

## 2020-09-15 NOTE — TELEPHONE ENCOUNTER
Magdalena Grewal from [facility] home health stated patient was opened for home care today.

## 2020-09-15 NOTE — TELEPHONE ENCOUNTER
Patient dc'd 9/14/2020, CABG. He is in TCM. He has a HFU appt with PCP on 9/30/2020, which is past the TCM book by date of 9/28/2020. He has his post op appts already scheduled. Please advise if 9/30 appt is okay or if patient needs to be seen sooner.

## 2020-09-17 NOTE — TELEPHONE ENCOUNTER
Teresita from [facility] physical evaluation was done today. Plan of care to see patient one time for first week and 2 visits on second and third week.  Please advise

## 2020-09-18 NOTE — PROGRESS NOTES
Rubi Lim is a 68year old male. Patient presents with:  Post-Op: CABG on 9/8/20,patient states he is doing well ,denies pain today     HPI:   Patient comes in today for hospital follow-up. He sees Dr. Medeiros Snare in the past he had a stent in 2004.   H capsule (0.4 mg total) by mouth daily. 90 capsule 1   • B Complex Vitamins (VITAMIN-B COMPLEX OR) Take by mouth. • Dorzolamide HCl 2 % Ophthalmic Solution 1 drop.      • Brimonidine Tartrate-Timolol (COMBIGAN) 0.2-0.5 % Ophthalmic Solution INSTILL ONE D headaches    EXAM:   /74   Pulse 82   Ht 5' 9\" (1.753 m)   Wt 209 lb (94.8 kg)   BMI 30.86 kg/m²   GENERAL: well developed, well nourished,in no apparent distress  SKIN: no rashes,no suspicious lesions  HEENT: atraumatic, normocephalic,ears and thro

## 2020-09-22 NOTE — TELEPHONE ENCOUNTER
Jody please see notes below. Patient post op CABG with elevated heart rate. You saw patient post op 9/18/20. Please advise.

## 2020-09-22 NOTE — TELEPHONE ENCOUNTER
Teresita calling from [facility] , she is PT     Reports pt is s/p CABG and has had elevated b/p readings   Today 165/95  164/90 left arm   162/100 right arm  Reports pt is asymptomatic at present ; taking meds as directed       Please advise and

## 2020-09-23 NOTE — TELEPHONE ENCOUNTER
For HTN may increase metoprolol to 37.5mg BID if HR is ok, should start cardiac rehab in next few weeks and keep an eye on BP there

## 2020-09-23 NOTE — TELEPHONE ENCOUNTER
Detailed message left (HIPAA verified) for patient that for elevated blood pressure Bruno Ralph's recommendations to increase metoprolol to 37.5 mg bid IF heart rate is >60.      Also left message for the physical therapist Cady Álvarez of the message that

## 2020-09-24 NOTE — TELEPHONE ENCOUNTER
Spoke with Mr. Dunia Guillory, he reports he missed my call and instructions (below) because he was at office visit with [place], [place] for Dr. Mo Lee who instructed him to increase Metoprolol to 50 mg bid. He started that dose last night.  He reports his pulse is usual

## 2020-09-24 NOTE — TELEPHONE ENCOUNTER
Pt states that his BP has been elevated in the last few days. Average has been 160/90. This morning is was 175/91. Pulse is in 70's and oxygen at around 95. This was with increased dosage of metoprolol. Call transferred to RN.

## 2020-09-30 NOTE — PROGRESS NOTES
HPI:    Patient ID: Mahendra Kern is a 65year old male. HPI  Patient is here for follow-up on hospitalization. I last saw him on August 5. Things were doing well at that time. A1c 7.1. PSA was going up but he was following up with urology.   Harsh Amaya Negative nuclear chemical stress test of the [facility] [place]) Oct 2014   • CAD (coronary artery disease)     x2; chemical stress test normal, done in [facility] Oct 2014   • Prostate cancer [facility]) May 2015    Treated with hormonal injection Trelstar 1 tablet (500 mg total) by mouth 3 (three) times daily. 90 tablet 0   • METFORMIN  MG Oral Tab TAKE ONE TABLET BY MOUTH TWICE DAILY WITH MEALS 60 tablet 5   • tamsulosin (FLOMAX) cap Take 1 capsule (0.4 mg total) by mouth daily.  90 capsule 1   • B C is alert. Skin: Skin is warm and dry. No rash noted. Psychiatric: He has a normal mood and affect.  Thought content normal.       Wt Readings from Last 6 Encounters:  09/30/20 : 212 lb (96.2 kg)  09/18/20 : 209 lb (94.8 kg)  09/14/20 : 209 lb 4.8 oz (94

## 2020-10-05 NOTE — TELEPHONE ENCOUNTER
From: Maximiliano Davis  To: Jackie Wick MD  Sent: 10/1/2020 1:08 PM CDT  Subject: Non-Urgent Medical Question    Dr Emilia Capone, will I need a PSA blood test before my appointment on October 7th?     Tia Martin

## 2020-10-06 NOTE — TELEPHONE ENCOUNTER
Pt requesting a call back regarding medication questions. No further details given pt would prefer to speak with RN.  Please call [phone]

## 2020-10-06 NOTE — PATIENT INSTRUCTIONS
Start amlodipine 5 mg daily to help further lower blood pressure    Monitor record blood pressure for a week and call with results    Get fasting blood test in 1 week    Go to cardiac rehab    May stop clopidogrel 3 months after surgery but continue aspiri

## 2020-10-06 NOTE — PROGRESS NOTES
[facility] CLINIC  PROGRESS NOTE    Analia Hoskins is a 65year old male. Patient presents with:   Follow - Up  CAD: CABG X3 9/2020  Hypertension: at home bp average 165/90    HPI:   This is a pleasant 65year old male history of remote stents who had rece Dorzolamide HCl 2 % Ophthalmic Solution 1 drop. • Brimonidine Tartrate-Timolol (COMBIGAN) 0.2-0.5 % Ophthalmic Solution INSTILL ONE DROP INTO BOTH EYES 2 TIMES DAILY     • Vitamin D, Cholecalciferol, 1000 units Oral Cap Take by mouth.      • aspirin 81 exertion  CARDIOVASCULAR:See HPI  GI: denies abdominal pain and denies heartburn  NEURO: denies headaches  Remainder of review of systems is completed and negative    EXAM:   /90   Pulse 60   Resp 18   Ht 5' 9\" (1.753 m)   Wt 211 lb (95.7 kg)   BMI issues and agrees to the plan. Follow Up: Return in about 3 months (around 1/6/2021).              Manette Dance, MD  10/6/2020

## 2020-10-07 NOTE — TELEPHONE ENCOUNTER
[facility]  Metoprolol 100 mg tab sent to pharmacy by Dr. Julianna Sher also has 25 mg tabs. Chart reviewed,   spoke with Mr. Nabila Gonzalez, instructed to stay on 100 mg daily    Amlodipine 5 mg tabs ordered. He was on 10 mg previously. Verified new dose of 5 mg daily.

## 2020-10-07 NOTE — PROGRESS NOTES
[facility] Urology  Follow-Up Visit    HPI: Mikie Rod is a 65year old male presents for a follow up visit. Patient was last seen on 7/9/2020.     INTERVAL HISTORY: Here for an [facility] injection for continuation of ADT for his biochemically recur significantly greater than left. Patient not interested in salvage therapy or initiation of ADT. Elected observation with repeat PSA in 3 months. - 6/2020 F/U: PSA almost double to 12.4 ng/mL. Elected to start ADT; preferably intermittent ADT.  Started o and allergies. REVIEW OF SYSTEMS:  Pertinent positives and negatives per HPI. A five-point review of systems was performed and is otherwise negative.       EXAM:  /82 (BP Location: Left arm, Patient Position: Sitting, Cuff Size: adult)   Wt 211 Axumin PET-CT (03/2020):  1. Abnormal F 18 Fluciclovine PET-CT study. 2. There is intensely increased activity in the posterior prostate bilaterally. Right significantly greater than left.   This is nonspecific but given the focality and the rising PS discussion involving counseling, further management and treatment planning.     Martha Sommer MD  10/7/2020

## 2020-10-12 NOTE — TELEPHONE ENCOUNTER
[id number] states missing diagnosis code and if patient is insulin dependent, please resend script with this information.

## 2020-10-12 NOTE — TELEPHONE ENCOUNTER
Pharmacy called stating that they received the ICD10 code, but will also need Insulin statement (if patient is dependent on it or not). Please advise.

## 2020-10-13 NOTE — TELEPHONE ENCOUNTER
AST/ALT  Alison Ralph APRN P Em Cardio Clinical Staff             Results reviewed and are stable, continue present management.  Liver panel is stable

## 2020-10-13 NOTE — TELEPHONE ENCOUNTER
Parmjit Robles, YONIS HERMAN Em Cardio Clinical Staff             Lipids are stable, trigs much improved, watch diet continue present management. Recheck in 1 yr.      [employer] message sent.

## 2020-10-19 NOTE — TELEPHONE ENCOUNTER
per your request: bp readings on Monday, Wednesday&Friday were all taken by therapists during my rehab sessions usually about 1:30-2:30( about two hours after meals and about six hours after medication).  All other days bp was taken about 11a.m. about three

## 2020-10-19 NOTE — TELEPHONE ENCOUNTER
From: Tamica Monteiro  To:  Deion Lobo MD  Sent: 10/19/2020 3:09 PM CDT  Subject: Other    per your request: bp readings on Monday, Wednesday&Friday were all taken by therapists during my rehab sessions usually about 1:30-2:30( about two hours aft

## 2020-10-19 NOTE — TELEPHONE ENCOUNTER
From: Fanaticall  To: Mamie Thompson MD  Sent: 10/16/2020 3:33 PM CDT  Subject: Other    per Dr Krystal Riley request these are my blood pressure readings from the past seven days:  SAT. 10/10 150/93, SUN 10/11 156/96, MON 10/12 140/70, TUE 10/13 15

## 2020-10-28 NOTE — TELEPHONE ENCOUNTER
Returned patients phone call.  Patient is S/P Cabg 9/9/20   States his leg incision continues to drain clear to reddish drainage since surgery states not red in color is brown in color scab keeps coming off has been washing with soap and water and betadine

## 2020-11-05 NOTE — TELEPHONE ENCOUNTER
From: Hossein Flower  To:  Jose Alfredo Prakash MD  Sent: 11/5/2020 2:47 PM CST  Subject: Other    the following are the recent resuts of my daily blood pressure readings:  10/tu160/89t18 S 150/93 h/10/83d248/80r/10/21hz323/89h/10/83w312/76r/10/14cw838/89/

## 2020-11-06 NOTE — PROGRESS NOTES
HPI:    Patient ID: Denis De La Fuente is a 65year old male. HPI  Patient is here for follow-up mainly on hypertension. Last seen on September 30 after he had a bypass surgery.   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 90 capsule 1   • B Complex Vitamins (VITAMIN-B COMPLEX OR) Take by mouth.      • Blood Glucose Monitoring Suppl Does not apply Device Check Blood sugar daily E11.9  Dispense per INS coverage 1 Device 0   • Glucose Blood (BLOOD GLUCOSE TEST) In Vitro prescriptions requested or ordered in this encounter       Imaging & Referrals:  Coco Mary MD

## 2020-11-06 NOTE — TELEPHONE ENCOUNTER
Pt LOV with Dr. Farida Prater 10/7/2020 pt pharmacy requesting refill on tamsulosin if you agree please review and sign med, I copied and pasted part of Farida Prater note below. PLAN:  1. Administer Eligard 45 mg 6 months subcu depot today.     2.  Continue Tamsulosin for m

## 2020-11-09 NOTE — TELEPHONE ENCOUNTER
Pt LOV with Dr. Leidy Humphries 10/7/2020 pt pharmacy requesting refill on tamsulosin if you agree please review and sign med I copied and pasted part of Leidy Humphries last note below. - 6/2020 F/U: PVR 6 mL's. Nocturia x 6. U/A 6/9/20 negative. Continue Tamsulosin.

## 2020-11-09 NOTE — TELEPHONE ENCOUNTER
Action Requested: Summary for Provider     []  Critical Lab, Recommendations Needed  [] Need Additional Advice  []   FYI    []   Need Orders  [] Need Medications Sent to Pharmacy  []  Other     SUMMARY: pt just had an office visit on Friday.   Then on Sun

## 2020-11-12 NOTE — TELEPHONE ENCOUNTER
From: Maximiliano Davis  To: YONIS Bucio  Sent: 11/11/2020 12:27 PM CST  Subject: Non-Urgent Medical Question    Cely Isaacs, do you think it is safe for me to make a brief visit to the [place] [address] or to get a haircut?  or to grocery store if I get hel

## 2020-11-19 NOTE — TELEPHONE ENCOUNTER
Spoke with Mr. Theresa Webb, discussed recommendations of testing, to try to do them prior to appointment with APN. Agreed with plan.

## 2020-11-19 NOTE — TELEPHONE ENCOUNTER
From: Mikie Rod  To: Tonia Macias MD  Sent: 11/19/2020 4:54 PM CST  Subject: Non-Urgent Medical Question    I have scheduled xray online but I am not sure if blood test requested will be at the same facility.  can someone contact me with an an

## 2020-11-19 NOTE — TELEPHONE ENCOUNTER
Patient calling to speak with nurse regarding discomfort under his arms and both sides of chest. Patient had open heart surgery within the last 6 weeks, transferring to nurse, thanks.

## 2020-11-19 NOTE — TELEPHONE ENCOUNTER
Spoke with Dr. Terence Mejia regarding below symptoms. Recommended SED rate and CXR, most likely muscle-skeletal healing from CABG but will check those tests. Detailed message left (HIPAA verified) of Dr. Teagan Rothman recommendations.  Instructions for scheduling

## 2020-11-24 NOTE — PROGRESS NOTES
Surekha Benitez is a 65year old male. Patient presents with:  Hospital F/U: open heart surgery about 6 weeks ago. chest tightness on the sides. Has been getting exercise.   States exercising does not make the tightness worse    HPI:   Patient comes in to for Pain. 30 tablet 0   • famoTIDine 20 MG Oral Tab Take 1 tablet (20 mg total) by mouth 2 (two) times daily. 60 tablet 0   • multivitamin Oral Tab Take 1 tablet by mouth daily.  30 tablet 0   • Vitamin C 500 MG Oral Tab Take 1 tablet (500 mg total) by mout otherwise  SKIN: denies any unusual skin lesions or rashes  RESPIRATORY: denies shortness of breath with exertion  CARDIOVASCULAR: no chest pain  GI: denies abdominal pain and denies heartburn  NEURO: denies headaches    EXAM:   /74 (BP Location: Rig

## 2021-01-01 ENCOUNTER — NURSE TRIAGE (OUTPATIENT)
Dept: INTERNAL MEDICINE CLINIC | Facility: CLINIC | Age: 71
End: 2021-01-01

## 2021-01-01 ENCOUNTER — HOSPITAL ENCOUNTER (OUTPATIENT)
Dept: INTERVENTIONAL RADIOLOGY/VASCULAR | Facility: HOSPITAL | Age: 71
Discharge: HOME | End: 2021-01-01
Attending: INTERNAL MEDICINE | Admitting: INTERNAL MEDICINE
Payer: MEDICARE

## 2021-01-01 ENCOUNTER — LAB ENCOUNTER (OUTPATIENT)
Dept: LAB | Facility: HOSPITAL | Age: 71
End: 2021-01-01
Attending: NURSE PRACTITIONER
Payer: MEDICARE

## 2021-01-01 ENCOUNTER — OFFICE VISIT (OUTPATIENT)
Dept: RADIATION ONCOLOGY | Facility: HOSPITAL | Age: 71
End: 2021-01-01
Attending: INTERNAL MEDICINE
Payer: MEDICARE

## 2021-01-01 ENCOUNTER — APPOINTMENT (OUTPATIENT)
Dept: CT IMAGING | Facility: HOSPITAL | Age: 71
DRG: 871 | End: 2021-01-01
Attending: EMERGENCY MEDICINE
Payer: MEDICARE

## 2021-01-01 ENCOUNTER — TELEPHONE (OUTPATIENT)
Dept: CARDIOLOGY CLINIC | Facility: CLINIC | Age: 71
End: 2021-01-01

## 2021-01-01 ENCOUNTER — TELEPHONE (OUTPATIENT)
Dept: HEMATOLOGY/ONCOLOGY | Facility: HOSPITAL | Age: 71
End: 2021-01-01

## 2021-01-01 ENCOUNTER — LAB ENCOUNTER (OUTPATIENT)
Dept: LAB | Facility: HOSPITAL | Age: 71
End: 2021-01-01
Attending: UROLOGY
Payer: MEDICARE

## 2021-01-01 ENCOUNTER — OFFICE VISIT (OUTPATIENT)
Dept: GASTROENTEROLOGY | Facility: CLINIC | Age: 71
End: 2021-01-01
Payer: MEDICARE

## 2021-01-01 ENCOUNTER — TELEPHONE (OUTPATIENT)
Dept: SURGERY | Facility: CLINIC | Age: 71
End: 2021-01-01

## 2021-01-01 ENCOUNTER — DOCUMENTATION ONLY (OUTPATIENT)
Dept: RADIATION ONCOLOGY | Facility: HOSPITAL | Age: 71
End: 2021-01-01

## 2021-01-01 ENCOUNTER — NURSE ONLY (OUTPATIENT)
Dept: SURGERY | Facility: CLINIC | Age: 71
End: 2021-01-01
Payer: MEDICARE

## 2021-01-01 ENCOUNTER — OFFICE VISIT (OUTPATIENT)
Dept: HEMATOLOGY/ONCOLOGY | Facility: HOSPITAL | Age: 71
End: 2021-01-01
Attending: INTERNAL MEDICINE
Payer: MEDICARE

## 2021-01-01 ENCOUNTER — APPOINTMENT (OUTPATIENT)
Dept: GENERAL RADIOLOGY | Facility: HOSPITAL | Age: 71
End: 2021-01-01
Attending: EMERGENCY MEDICINE
Payer: MEDICARE

## 2021-01-01 ENCOUNTER — APPOINTMENT (OUTPATIENT)
Dept: GENERAL RADIOLOGY | Facility: HOSPITAL | Age: 71
DRG: 871 | End: 2021-01-01
Attending: INTERNAL MEDICINE
Payer: MEDICARE

## 2021-01-01 ENCOUNTER — OFFICE VISIT (OUTPATIENT)
Dept: INTERNAL MEDICINE CLINIC | Facility: CLINIC | Age: 71
End: 2021-01-01
Payer: MEDICARE

## 2021-01-01 ENCOUNTER — HOSPITAL ENCOUNTER (OUTPATIENT)
Dept: NUCLEAR MEDICINE | Facility: HOSPITAL | Age: 71
Discharge: HOME | End: 2021-01-01
Attending: INTERNAL MEDICINE
Payer: MEDICARE

## 2021-01-01 ENCOUNTER — PATIENT MESSAGE (OUTPATIENT)
Dept: SURGERY | Facility: CLINIC | Age: 71
End: 2021-01-01

## 2021-01-01 ENCOUNTER — OFFICE VISIT (OUTPATIENT)
Dept: SURGERY | Facility: CLINIC | Age: 71
End: 2021-01-01
Payer: MEDICARE

## 2021-01-01 ENCOUNTER — HOSPITAL ENCOUNTER (OUTPATIENT)
Facility: HOSPITAL | Age: 71
Discharge: HOME | End: 2021-01-01
Attending: UROLOGY | Admitting: UROLOGY
Payer: MEDICARE

## 2021-01-01 ENCOUNTER — IMMUNIZATION (OUTPATIENT)
Dept: LAB | Age: 71
End: 2021-01-01
Attending: HOSPITALIST
Payer: MEDICARE

## 2021-01-01 ENCOUNTER — HOSPITAL ENCOUNTER (OUTPATIENT)
Dept: GENERAL RADIOLOGY | Facility: HOSPITAL | Age: 71
Discharge: HOME | End: 2021-01-01
Attending: UROLOGY
Payer: MEDICARE

## 2021-01-01 ENCOUNTER — HOSPITAL ENCOUNTER (EMERGENCY)
Facility: HOSPITAL | Age: 71
Discharge: HOME | End: 2021-01-01
Attending: EMERGENCY MEDICINE
Payer: MEDICARE

## 2021-01-01 ENCOUNTER — LAB ENCOUNTER (OUTPATIENT)
Dept: LAB | Age: 71
End: 2021-01-01
Attending: UROLOGY
Payer: MEDICARE

## 2021-01-01 ENCOUNTER — APPOINTMENT (OUTPATIENT)
Dept: GENERAL RADIOLOGY | Facility: HOSPITAL | Age: 71
DRG: 602 | End: 2021-01-01
Attending: EMERGENCY MEDICINE
Payer: MEDICARE

## 2021-01-01 ENCOUNTER — PATIENT MESSAGE (OUTPATIENT)
Dept: CARDIOLOGY CLINIC | Facility: CLINIC | Age: 71
End: 2021-01-01

## 2021-01-01 ENCOUNTER — OFFICE VISIT (OUTPATIENT)
Dept: CARDIOLOGY CLINIC | Facility: CLINIC | Age: 71
End: 2021-01-01
Payer: MEDICARE

## 2021-01-01 ENCOUNTER — ANESTHESIA EVENT (OUTPATIENT)
Dept: ENDOSCOPY | Facility: HOSPITAL | Age: 71
DRG: 374 | End: 2021-01-01
Payer: MEDICARE

## 2021-01-01 ENCOUNTER — ANESTHESIA (OUTPATIENT)
Dept: SURGERY | Facility: HOSPITAL | Age: 71
End: 2021-01-01
Payer: MEDICARE

## 2021-01-01 ENCOUNTER — APPOINTMENT (OUTPATIENT)
Dept: CARDIAC REHAB | Facility: HOSPITAL | Age: 71
End: 2021-01-01
Attending: INTERNAL MEDICINE
Payer: MEDICARE

## 2021-01-01 ENCOUNTER — HOSPITAL ENCOUNTER (OUTPATIENT)
Dept: CT IMAGING | Facility: HOSPITAL | Age: 71
Discharge: HOME | End: 2021-01-01
Attending: NURSE PRACTITIONER
Payer: MEDICARE

## 2021-01-01 ENCOUNTER — HOSPITAL ENCOUNTER (OUTPATIENT)
Dept: GENERAL RADIOLOGY | Facility: HOSPITAL | Age: 71
Discharge: HOME | End: 2021-01-01
Attending: INTERNAL MEDICINE
Payer: MEDICARE

## 2021-01-01 ENCOUNTER — HOSPITAL ENCOUNTER (INPATIENT)
Facility: HOSPITAL | Age: 71
LOS: 7 days | Discharge: NURSING, REHAB OR LONG-TERM CARE | DRG: 602 | End: 2021-01-01
Attending: EMERGENCY MEDICINE | Admitting: HOSPITALIST
Payer: MEDICARE

## 2021-01-01 ENCOUNTER — ANESTHESIA EVENT (OUTPATIENT)
Dept: SURGERY | Facility: HOSPITAL | Age: 71
End: 2021-01-01
Payer: MEDICARE

## 2021-01-01 ENCOUNTER — LAB ENCOUNTER (OUTPATIENT)
Dept: LAB | Facility: HOSPITAL | Age: 71
End: 2021-01-01
Attending: INTERNAL MEDICINE
Payer: MEDICARE

## 2021-01-01 ENCOUNTER — LAB REQUISITION (OUTPATIENT)
Dept: LAB | Facility: HOSPITAL | Age: 71
End: 2021-01-01
Payer: MEDICARE

## 2021-01-01 ENCOUNTER — TELEPHONE (OUTPATIENT)
Dept: INTERNAL MEDICINE CLINIC | Facility: CLINIC | Age: 71
End: 2021-01-01

## 2021-01-01 ENCOUNTER — HOSPITAL ENCOUNTER (OUTPATIENT)
Dept: CT IMAGING | Facility: HOSPITAL | Age: 71
Discharge: HOME | End: 2021-01-01
Attending: INTERNAL MEDICINE
Payer: MEDICARE

## 2021-01-01 ENCOUNTER — APPOINTMENT (OUTPATIENT)
Dept: GENERAL RADIOLOGY | Facility: HOSPITAL | Age: 71
DRG: 871 | End: 2021-01-01
Attending: EMERGENCY MEDICINE
Payer: MEDICARE

## 2021-01-01 ENCOUNTER — APPOINTMENT (OUTPATIENT)
Dept: CT IMAGING | Facility: HOSPITAL | Age: 71
DRG: 374 | End: 2021-01-01
Attending: EMERGENCY MEDICINE
Payer: MEDICARE

## 2021-01-01 ENCOUNTER — MOBILE ENCOUNTER (OUTPATIENT)
Dept: SURGERY | Facility: CLINIC | Age: 71
End: 2021-01-01

## 2021-01-01 ENCOUNTER — PROCEDURE (OUTPATIENT)
Dept: SURGERY | Facility: CLINIC | Age: 71
End: 2021-01-01
Payer: MEDICARE

## 2021-01-01 ENCOUNTER — HOSPITAL ENCOUNTER (OUTPATIENT)
Dept: CV DIAGNOSTICS | Facility: HOSPITAL | Age: 71
Discharge: HOME | End: 2021-01-01
Attending: INTERNAL MEDICINE
Payer: MEDICARE

## 2021-01-01 ENCOUNTER — HOSPITAL ENCOUNTER (OUTPATIENT)
Dept: INTERVENTIONAL RADIOLOGY/VASCULAR | Facility: HOSPITAL | Age: 71
Discharge: HOME | End: 2021-01-01
Attending: INTERNAL MEDICINE
Payer: MEDICARE

## 2021-01-01 ENCOUNTER — PATIENT OUTREACH (OUTPATIENT)
Dept: CASE MANAGEMENT | Age: 71
End: 2021-01-01

## 2021-01-01 ENCOUNTER — TELEPHONE (OUTPATIENT)
Dept: RADIATION ONCOLOGY | Facility: HOSPITAL | Age: 71
End: 2021-01-01

## 2021-01-01 ENCOUNTER — ANESTHESIA (OUTPATIENT)
Dept: ENDOSCOPY | Facility: HOSPITAL | Age: 71
DRG: 374 | End: 2021-01-01
Payer: MEDICARE

## 2021-01-01 ENCOUNTER — TELEPHONE (OUTPATIENT)
Dept: INTERVENTIONAL RADIOLOGY/VASCULAR | Facility: HOSPITAL | Age: 71
End: 2021-01-01

## 2021-01-01 ENCOUNTER — APPOINTMENT (OUTPATIENT)
Dept: CV DIAGNOSTICS | Facility: HOSPITAL | Age: 71
DRG: 374 | End: 2021-01-01
Attending: INTERNAL MEDICINE
Payer: MEDICARE

## 2021-01-01 ENCOUNTER — APPOINTMENT (OUTPATIENT)
Dept: ULTRASOUND IMAGING | Facility: HOSPITAL | Age: 71
DRG: 374 | End: 2021-01-01
Attending: HOSPITALIST
Payer: MEDICARE

## 2021-01-01 ENCOUNTER — HOSPITAL ENCOUNTER (INPATIENT)
Facility: HOSPITAL | Age: 71
LOS: 5 days | Discharge: HOME HEALTH | DRG: 374 | End: 2021-01-01
Attending: EMERGENCY MEDICINE | Admitting: HOSPITALIST
Payer: MEDICARE

## 2021-01-01 ENCOUNTER — PATIENT MESSAGE (OUTPATIENT)
Dept: HEMATOLOGY/ONCOLOGY | Facility: HOSPITAL | Age: 71
End: 2021-01-01

## 2021-01-01 ENCOUNTER — LAB ENCOUNTER (OUTPATIENT)
Dept: LAB | Age: 71
End: 2021-01-01
Attending: NURSE PRACTITIONER
Payer: MEDICARE

## 2021-01-01 ENCOUNTER — APPOINTMENT (OUTPATIENT)
Dept: GENERAL RADIOLOGY | Facility: HOSPITAL | Age: 71
DRG: 602 | End: 2021-01-01
Attending: HOSPITALIST
Payer: MEDICARE

## 2021-01-01 ENCOUNTER — OFFICE VISIT (OUTPATIENT)
Dept: HEMATOLOGY/ONCOLOGY | Facility: HOSPITAL | Age: 71
End: 2021-01-01
Attending: NURSE PRACTITIONER
Payer: MEDICARE

## 2021-01-01 ENCOUNTER — HOSPITAL ENCOUNTER (INPATIENT)
Facility: HOSPITAL | Age: 71
LOS: 1 days | DRG: 871 | End: 2021-01-01
Attending: EMERGENCY MEDICINE | Admitting: INTERNAL MEDICINE
Payer: MEDICARE

## 2021-01-01 VITALS
HEIGHT: 69 IN | SYSTOLIC BLOOD PRESSURE: 114 MMHG | DIASTOLIC BLOOD PRESSURE: 73 MMHG | OXYGEN SATURATION: 94 % | BODY MASS INDEX: 27.85 KG/M2 | RESPIRATION RATE: 17 BRPM | TEMPERATURE: 98 F | WEIGHT: 188 LBS | HEART RATE: 90 BPM

## 2021-01-01 VITALS
HEART RATE: 69 BPM | DIASTOLIC BLOOD PRESSURE: 69 MMHG | HEIGHT: 69 IN | BODY MASS INDEX: 30.36 KG/M2 | TEMPERATURE: 98 F | SYSTOLIC BLOOD PRESSURE: 158 MMHG | RESPIRATION RATE: 18 BRPM | WEIGHT: 205 LBS | OXYGEN SATURATION: 96 %

## 2021-01-01 VITALS
WEIGHT: 188 LBS | DIASTOLIC BLOOD PRESSURE: 65 MMHG | HEIGHT: 69 IN | HEART RATE: 80 BPM | BODY MASS INDEX: 27.85 KG/M2 | SYSTOLIC BLOOD PRESSURE: 114 MMHG

## 2021-01-01 VITALS
DIASTOLIC BLOOD PRESSURE: 68 MMHG | OXYGEN SATURATION: 97 % | TEMPERATURE: 98 F | SYSTOLIC BLOOD PRESSURE: 134 MMHG | HEIGHT: 69 IN | BODY MASS INDEX: 27.85 KG/M2 | HEART RATE: 70 BPM | RESPIRATION RATE: 18 BRPM | WEIGHT: 188 LBS

## 2021-01-01 VITALS
SYSTOLIC BLOOD PRESSURE: 144 MMHG | DIASTOLIC BLOOD PRESSURE: 72 MMHG | RESPIRATION RATE: 16 BRPM | TEMPERATURE: 98 F | BODY MASS INDEX: 30 KG/M2 | WEIGHT: 205.19 LBS | OXYGEN SATURATION: 95 % | HEART RATE: 56 BPM

## 2021-01-01 VITALS
WEIGHT: 172.69 LBS | DIASTOLIC BLOOD PRESSURE: 75 MMHG | HEIGHT: 69 IN | SYSTOLIC BLOOD PRESSURE: 144 MMHG | TEMPERATURE: 99 F | BODY MASS INDEX: 25.58 KG/M2 | OXYGEN SATURATION: 96 % | RESPIRATION RATE: 16 BRPM | HEART RATE: 89 BPM

## 2021-01-01 VITALS
WEIGHT: 205 LBS | BODY MASS INDEX: 30.36 KG/M2 | SYSTOLIC BLOOD PRESSURE: 169 MMHG | OXYGEN SATURATION: 97 % | HEIGHT: 69 IN | DIASTOLIC BLOOD PRESSURE: 81 MMHG | HEART RATE: 73 BPM | RESPIRATION RATE: 16 BRPM | TEMPERATURE: 97 F

## 2021-01-01 VITALS
RESPIRATION RATE: 18 BRPM | DIASTOLIC BLOOD PRESSURE: 69 MMHG | WEIGHT: 205 LBS | OXYGEN SATURATION: 98 % | HEIGHT: 69 IN | SYSTOLIC BLOOD PRESSURE: 120 MMHG | HEART RATE: 57 BPM | BODY MASS INDEX: 30.36 KG/M2 | TEMPERATURE: 97 F

## 2021-01-01 VITALS
HEART RATE: 68 BPM | BODY MASS INDEX: 29.62 KG/M2 | OXYGEN SATURATION: 97 % | RESPIRATION RATE: 18 BRPM | WEIGHT: 200 LBS | HEIGHT: 69 IN | BODY MASS INDEX: 31.55 KG/M2 | DIASTOLIC BLOOD PRESSURE: 66 MMHG | HEIGHT: 69 IN | HEART RATE: 82 BPM | SYSTOLIC BLOOD PRESSURE: 139 MMHG | DIASTOLIC BLOOD PRESSURE: 82 MMHG | TEMPERATURE: 99 F | SYSTOLIC BLOOD PRESSURE: 154 MMHG | WEIGHT: 213 LBS

## 2021-01-01 VITALS
HEIGHT: 69 IN | TEMPERATURE: 98 F | OXYGEN SATURATION: 97 % | DIASTOLIC BLOOD PRESSURE: 89 MMHG | WEIGHT: 199.94 LBS | BODY MASS INDEX: 29.61 KG/M2 | HEART RATE: 66 BPM | RESPIRATION RATE: 18 BRPM | SYSTOLIC BLOOD PRESSURE: 154 MMHG

## 2021-01-01 VITALS
WEIGHT: 200 LBS | SYSTOLIC BLOOD PRESSURE: 148 MMHG | BODY MASS INDEX: 29.62 KG/M2 | DIASTOLIC BLOOD PRESSURE: 66 MMHG | HEART RATE: 74 BPM | TEMPERATURE: 98 F | RESPIRATION RATE: 20 BRPM | HEIGHT: 69 IN

## 2021-01-01 VITALS
WEIGHT: 203 LBS | SYSTOLIC BLOOD PRESSURE: 132 MMHG | HEIGHT: 69 IN | BODY MASS INDEX: 30.07 KG/M2 | DIASTOLIC BLOOD PRESSURE: 67 MMHG | HEART RATE: 65 BPM

## 2021-01-01 VITALS
RESPIRATION RATE: 18 BRPM | HEART RATE: 64 BPM | SYSTOLIC BLOOD PRESSURE: 156 MMHG | OXYGEN SATURATION: 97 % | DIASTOLIC BLOOD PRESSURE: 82 MMHG | TEMPERATURE: 97 F

## 2021-01-01 VITALS
WEIGHT: 202 LBS | OXYGEN SATURATION: 96 % | BODY MASS INDEX: 29.92 KG/M2 | SYSTOLIC BLOOD PRESSURE: 145 MMHG | RESPIRATION RATE: 18 BRPM | HEIGHT: 69 IN | TEMPERATURE: 98 F | DIASTOLIC BLOOD PRESSURE: 71 MMHG | HEART RATE: 55 BPM

## 2021-01-01 VITALS
RESPIRATION RATE: 20 BRPM | HEART RATE: 58 BPM | HEIGHT: 69 IN | SYSTOLIC BLOOD PRESSURE: 134 MMHG | BODY MASS INDEX: 30.36 KG/M2 | WEIGHT: 205 LBS | DIASTOLIC BLOOD PRESSURE: 68 MMHG

## 2021-01-01 VITALS
HEART RATE: 63 BPM | BODY MASS INDEX: 26.75 KG/M2 | HEIGHT: 69 IN | DIASTOLIC BLOOD PRESSURE: 63 MMHG | RESPIRATION RATE: 18 BRPM | SYSTOLIC BLOOD PRESSURE: 121 MMHG | WEIGHT: 180.63 LBS | TEMPERATURE: 98 F | OXYGEN SATURATION: 97 %

## 2021-01-01 VITALS
HEART RATE: 72 BPM | HEIGHT: 69 IN | DIASTOLIC BLOOD PRESSURE: 86 MMHG | WEIGHT: 215 LBS | SYSTOLIC BLOOD PRESSURE: 166 MMHG | BODY MASS INDEX: 31.84 KG/M2

## 2021-01-01 VITALS
HEART RATE: 62 BPM | SYSTOLIC BLOOD PRESSURE: 129 MMHG | BODY MASS INDEX: 30.36 KG/M2 | WEIGHT: 205 LBS | HEIGHT: 69 IN | DIASTOLIC BLOOD PRESSURE: 77 MMHG

## 2021-01-01 VITALS
WEIGHT: 188 LBS | HEART RATE: 77 BPM | DIASTOLIC BLOOD PRESSURE: 60 MMHG | TEMPERATURE: 98 F | BODY MASS INDEX: 28 KG/M2 | OXYGEN SATURATION: 96 % | SYSTOLIC BLOOD PRESSURE: 115 MMHG | RESPIRATION RATE: 19 BRPM

## 2021-01-01 VITALS
WEIGHT: 200 LBS | BODY MASS INDEX: 29.62 KG/M2 | HEART RATE: 59 BPM | HEIGHT: 69 IN | SYSTOLIC BLOOD PRESSURE: 145 MMHG | DIASTOLIC BLOOD PRESSURE: 76 MMHG

## 2021-01-01 VITALS
SYSTOLIC BLOOD PRESSURE: 115 MMHG | RESPIRATION RATE: 18 BRPM | OXYGEN SATURATION: 96 % | DIASTOLIC BLOOD PRESSURE: 50 MMHG | HEART RATE: 76 BPM | TEMPERATURE: 98 F

## 2021-01-01 VITALS
OXYGEN SATURATION: 100 % | HEART RATE: 80 BPM | DIASTOLIC BLOOD PRESSURE: 47 MMHG | WEIGHT: 180.56 LBS | SYSTOLIC BLOOD PRESSURE: 81 MMHG | TEMPERATURE: 98 F | BODY MASS INDEX: 27 KG/M2 | RESPIRATION RATE: 24 BRPM

## 2021-01-01 VITALS
SYSTOLIC BLOOD PRESSURE: 132 MMHG | OXYGEN SATURATION: 95 % | DIASTOLIC BLOOD PRESSURE: 59 MMHG | TEMPERATURE: 98 F | HEART RATE: 63 BPM | RESPIRATION RATE: 16 BRPM

## 2021-01-01 VITALS
HEART RATE: 61 BPM | BODY MASS INDEX: 31.84 KG/M2 | WEIGHT: 215 LBS | SYSTOLIC BLOOD PRESSURE: 171 MMHG | HEIGHT: 69 IN | DIASTOLIC BLOOD PRESSURE: 83 MMHG

## 2021-01-01 VITALS
HEIGHT: 69 IN | SYSTOLIC BLOOD PRESSURE: 155 MMHG | HEART RATE: 60 BPM | WEIGHT: 202 LBS | DIASTOLIC BLOOD PRESSURE: 82 MMHG | BODY MASS INDEX: 29.92 KG/M2 | TEMPERATURE: 98 F | OXYGEN SATURATION: 97 % | RESPIRATION RATE: 18 BRPM

## 2021-01-01 VITALS
HEART RATE: 66 BPM | SYSTOLIC BLOOD PRESSURE: 148 MMHG | BODY MASS INDEX: 31 KG/M2 | WEIGHT: 213 LBS | DIASTOLIC BLOOD PRESSURE: 80 MMHG

## 2021-01-01 VITALS
WEIGHT: 200 LBS | HEIGHT: 69 IN | BODY MASS INDEX: 29.62 KG/M2 | SYSTOLIC BLOOD PRESSURE: 154 MMHG | DIASTOLIC BLOOD PRESSURE: 76 MMHG | HEART RATE: 58 BPM

## 2021-01-01 DIAGNOSIS — M54.6 CHRONIC RIGHT-SIDED THORACIC BACK PAIN: Primary | ICD-10-CM

## 2021-01-01 DIAGNOSIS — C61 MALIGNANT NEOPLASM OF PROSTATE METASTATIC TO BONE (HCC): Primary | ICD-10-CM

## 2021-01-01 DIAGNOSIS — Z79.818 ANDROGEN DEPRIVATION THERAPY: ICD-10-CM

## 2021-01-01 DIAGNOSIS — M54.6 CHRONIC RIGHT-SIDED THORACIC BACK PAIN: ICD-10-CM

## 2021-01-01 DIAGNOSIS — R06.00 DOE (DYSPNEA ON EXERTION): ICD-10-CM

## 2021-01-01 DIAGNOSIS — R31.29 MICROSCOPIC HEMATURIA: ICD-10-CM

## 2021-01-01 DIAGNOSIS — M10.9 GOUT, UNSPECIFIED CAUSE, UNSPECIFIED CHRONICITY, UNSPECIFIED SITE: ICD-10-CM

## 2021-01-01 DIAGNOSIS — C61 MALIGNANT NEOPLASM OF PROSTATE (HCC): ICD-10-CM

## 2021-01-01 DIAGNOSIS — R35.1 NOCTURIA: ICD-10-CM

## 2021-01-01 DIAGNOSIS — I25.10 CORONARY ARTERY DISEASE INVOLVING NATIVE CORONARY ARTERY OF NATIVE HEART WITHOUT ANGINA PECTORIS: ICD-10-CM

## 2021-01-01 DIAGNOSIS — Z51.81 MONITORING FOR ANTICOAGULANT USE: ICD-10-CM

## 2021-01-01 DIAGNOSIS — C61 MALIGNANT NEOPLASM OF PROSTATE (HCC): Primary | ICD-10-CM

## 2021-01-01 DIAGNOSIS — C61 RECURRENT PROSTATE CANCER (HCC): Primary | ICD-10-CM

## 2021-01-01 DIAGNOSIS — Z01.818 PREOP EXAMINATION: ICD-10-CM

## 2021-01-01 DIAGNOSIS — M79.602 LEFT ARM PAIN: Primary | ICD-10-CM

## 2021-01-01 DIAGNOSIS — C79.51 BONE METASTASIS (HCC): ICD-10-CM

## 2021-01-01 DIAGNOSIS — C61 PROSTATE CANCER (HCC): ICD-10-CM

## 2021-01-01 DIAGNOSIS — E11.9 TYPE 2 DIABETES MELLITUS WITHOUT COMPLICATION, UNSPECIFIED WHETHER LONG TERM INSULIN USE (HCC): Primary | ICD-10-CM

## 2021-01-01 DIAGNOSIS — I50.23 ACUTE ON CHRONIC SYSTOLIC CONGESTIVE HEART FAILURE (HCC): ICD-10-CM

## 2021-01-01 DIAGNOSIS — Z01.818 PREOP TESTING: Primary | ICD-10-CM

## 2021-01-01 DIAGNOSIS — R07.9 CHEST PAIN, UNSPECIFIED TYPE: Primary | ICD-10-CM

## 2021-01-01 DIAGNOSIS — C79.51 PROSTATE CANCER METASTATIC TO BONE (HCC): ICD-10-CM

## 2021-01-01 DIAGNOSIS — C79.51 PROSTATE CANCER METASTATIC TO BONE (HCC): Primary | ICD-10-CM

## 2021-01-01 DIAGNOSIS — E78.49 OTHER HYPERLIPIDEMIA: ICD-10-CM

## 2021-01-01 DIAGNOSIS — G89.29 CHRONIC RIGHT-SIDED THORACIC BACK PAIN: Primary | ICD-10-CM

## 2021-01-01 DIAGNOSIS — E55.9 VITAMIN D DEFICIENCY: ICD-10-CM

## 2021-01-01 DIAGNOSIS — R94.39 ABNORMAL STRESS TEST: ICD-10-CM

## 2021-01-01 DIAGNOSIS — G89.29 CHRONIC RIGHT-SIDED THORACIC BACK PAIN: ICD-10-CM

## 2021-01-01 DIAGNOSIS — Z79.01 MONITORING FOR ANTICOAGULANT USE: ICD-10-CM

## 2021-01-01 DIAGNOSIS — C61 PROSTATE CANCER METASTATIC TO BONE (HCC): Primary | ICD-10-CM

## 2021-01-01 DIAGNOSIS — Z01.818 PRE-OP TESTING: Primary | ICD-10-CM

## 2021-01-01 DIAGNOSIS — C61 BIOCHEMICALLY RECURRENT CASTRATION-RESISTANT ADENOCARCINOMA OF PROSTATE (HCC): ICD-10-CM

## 2021-01-01 DIAGNOSIS — Z23 NEED FOR VACCINATION: Primary | ICD-10-CM

## 2021-01-01 DIAGNOSIS — R33.9 URINARY RETENTION: Primary | ICD-10-CM

## 2021-01-01 DIAGNOSIS — S06.5X9A ACUTE SUBDURAL HEMATOMA (HCC): ICD-10-CM

## 2021-01-01 DIAGNOSIS — Z71.9 ENCOUNTER FOR HEALTH EDUCATION: ICD-10-CM

## 2021-01-01 DIAGNOSIS — R53.1 WEAKNESS GENERALIZED: ICD-10-CM

## 2021-01-01 DIAGNOSIS — D64.9 ANEMIA, UNSPECIFIED TYPE: Primary | ICD-10-CM

## 2021-01-01 DIAGNOSIS — S90.414A INFECTED ABRASION OF TOE OF RIGHT FOOT, INITIAL ENCOUNTER: ICD-10-CM

## 2021-01-01 DIAGNOSIS — R97.21 RISING PSA FOLLOWING TREATMENT FOR MALIGNANT NEOPLASM OF PROSTATE: ICD-10-CM

## 2021-01-01 DIAGNOSIS — E11.9 TYPE 2 DIABETES MELLITUS WITHOUT COMPLICATION, WITHOUT LONG-TERM CURRENT USE OF INSULIN (HCC): Primary | ICD-10-CM

## 2021-01-01 DIAGNOSIS — R07.89 CHEST WALL DISCOMFORT: ICD-10-CM

## 2021-01-01 DIAGNOSIS — C61 RECURRENT PROSTATE CANCER (HCC): ICD-10-CM

## 2021-01-01 DIAGNOSIS — E53.9 VITAMIN B DEFICIENCY: ICD-10-CM

## 2021-01-01 DIAGNOSIS — E78.5 HYPERLIPIDEMIA, UNSPECIFIED HYPERLIPIDEMIA TYPE: ICD-10-CM

## 2021-01-01 DIAGNOSIS — Z01.818 PREOP TESTING: ICD-10-CM

## 2021-01-01 DIAGNOSIS — Z87.448 HISTORY OF URETHRAL STRICTURE: ICD-10-CM

## 2021-01-01 DIAGNOSIS — I25.10 ATHEROSCLEROSIS OF NATIVE CORONARY ARTERY OF NATIVE HEART WITHOUT ANGINA PECTORIS: ICD-10-CM

## 2021-01-01 DIAGNOSIS — J96.00 ACUTE RESPIRATORY FAILURE, UNSPECIFIED WHETHER WITH HYPOXIA OR HYPERCAPNIA (HCC): Primary | ICD-10-CM

## 2021-01-01 DIAGNOSIS — D64.9 ANEMIA, UNSPECIFIED TYPE: ICD-10-CM

## 2021-01-01 DIAGNOSIS — C79.51 MALIGNANT NEOPLASM OF PROSTATE METASTATIC TO BONE (HCC): Primary | ICD-10-CM

## 2021-01-01 DIAGNOSIS — Z19.1 BIOCHEMICALLY RECURRENT CASTRATION-RESISTANT ADENOCARCINOMA OF PROSTATE (HCC): ICD-10-CM

## 2021-01-01 DIAGNOSIS — C61 BIOCHEMICALLY RECURRENT CASTRATION-RESISTANT ADENOCARCINOMA OF PROSTATE (HCC): Primary | ICD-10-CM

## 2021-01-01 DIAGNOSIS — I10 ESSENTIAL HYPERTENSION: Primary | ICD-10-CM

## 2021-01-01 DIAGNOSIS — C79.51 BONE METASTASES (HCC): Primary | ICD-10-CM

## 2021-01-01 DIAGNOSIS — C61 PROSTATE CANCER METASTATIC TO BONE (HCC): ICD-10-CM

## 2021-01-01 DIAGNOSIS — Z51.11 CHEMOTHERAPY MANAGEMENT, ENCOUNTER FOR: ICD-10-CM

## 2021-01-01 DIAGNOSIS — Z23 NEED FOR VACCINATION: ICD-10-CM

## 2021-01-01 DIAGNOSIS — D70.9 NEUTROPENIC FEVER (HCC): ICD-10-CM

## 2021-01-01 DIAGNOSIS — E11.9 TYPE 2 DIABETES MELLITUS WITHOUT COMPLICATION, WITHOUT LONG-TERM CURRENT USE OF INSULIN (HCC): ICD-10-CM

## 2021-01-01 DIAGNOSIS — R77.8 ELEVATED TROPONIN: ICD-10-CM

## 2021-01-01 DIAGNOSIS — R39.9 LOWER URINARY TRACT SYMPTOMS (LUTS): ICD-10-CM

## 2021-01-01 DIAGNOSIS — C79.51 BONE METASTASES (HCC): ICD-10-CM

## 2021-01-01 DIAGNOSIS — R93.89 ABNORMAL FINDING ON IMAGING: Primary | ICD-10-CM

## 2021-01-01 DIAGNOSIS — R07.9 CHEST PAIN, UNSPECIFIED TYPE: ICD-10-CM

## 2021-01-01 DIAGNOSIS — M54.6 ACUTE RIGHT-SIDED THORACIC BACK PAIN: ICD-10-CM

## 2021-01-01 DIAGNOSIS — Z01.818 PREOPERATIVE TESTING: ICD-10-CM

## 2021-01-01 DIAGNOSIS — M54.50 CHRONIC MIDLINE LOW BACK PAIN WITHOUT SCIATICA: Primary | ICD-10-CM

## 2021-01-01 DIAGNOSIS — Z01.818 PREOPERATIVE TESTING: Primary | ICD-10-CM

## 2021-01-01 DIAGNOSIS — C79.51 SECONDARY MALIGNANT NEOPLASM OF BONE (HCC): ICD-10-CM

## 2021-01-01 DIAGNOSIS — R33.9 INCOMPLETE BLADDER EMPTYING: ICD-10-CM

## 2021-01-01 DIAGNOSIS — D64.9 ANEMIA: ICD-10-CM

## 2021-01-01 DIAGNOSIS — R39.9 LOWER URINARY TRACT SYMPTOMS (LUTS): Primary | ICD-10-CM

## 2021-01-01 DIAGNOSIS — R06.02 SOB (SHORTNESS OF BREATH): ICD-10-CM

## 2021-01-01 DIAGNOSIS — M54.50 ACUTE RIGHT-SIDED LOW BACK PAIN, UNSPECIFIED WHETHER SCIATICA PRESENT: ICD-10-CM

## 2021-01-01 DIAGNOSIS — E83.52 HYPERCALCEMIA: ICD-10-CM

## 2021-01-01 DIAGNOSIS — Z79.899 MEDICATION MANAGEMENT: ICD-10-CM

## 2021-01-01 DIAGNOSIS — I10 ESSENTIAL HYPERTENSION: ICD-10-CM

## 2021-01-01 DIAGNOSIS — R50.81 NEUTROPENIC FEVER (HCC): ICD-10-CM

## 2021-01-01 DIAGNOSIS — Z19.1 BIOCHEMICALLY RECURRENT CASTRATION-RESISTANT ADENOCARCINOMA OF PROSTATE (HCC): Primary | ICD-10-CM

## 2021-01-01 DIAGNOSIS — L08.9 INFECTED ABRASION OF TOE OF RIGHT FOOT, INITIAL ENCOUNTER: ICD-10-CM

## 2021-01-01 DIAGNOSIS — E87.2 LACTIC ACIDOSIS: ICD-10-CM

## 2021-01-01 DIAGNOSIS — Z01.818 PRE-OP TESTING: ICD-10-CM

## 2021-01-01 DIAGNOSIS — D61.818 PANCYTOPENIA (HCC): ICD-10-CM

## 2021-01-01 DIAGNOSIS — R33.9 URINARY RETENTION: ICD-10-CM

## 2021-01-01 DIAGNOSIS — G89.29 CHRONIC MIDLINE LOW BACK PAIN WITHOUT SCIATICA: Primary | ICD-10-CM

## 2021-01-01 DIAGNOSIS — Z90.79 S/P TURP (STATUS POST TRANSURETHRAL RESECTION OF PROSTATE): ICD-10-CM

## 2021-01-01 DIAGNOSIS — R07.89 CHEST DISCOMFORT: ICD-10-CM

## 2021-01-01 DIAGNOSIS — Z46.6 ENCOUNTER FOR FOLEY CATHETER REMOVAL: Primary | ICD-10-CM

## 2021-01-01 DIAGNOSIS — R41.82 ALTERED MENTAL STATUS, UNSPECIFIED ALTERED MENTAL STATUS TYPE: ICD-10-CM

## 2021-01-01 DIAGNOSIS — E83.51 HYPOCALCEMIA: Primary | ICD-10-CM

## 2021-01-01 LAB
ALBUMIN SERPL-MCNC: 1.5 G/DL (ref 3.4–5)
ALBUMIN SERPL-MCNC: 1.8 G/DL (ref 3.4–5)
ALBUMIN SERPL-MCNC: 1.9 G/DL (ref 3.4–5)
ALBUMIN SERPL-MCNC: 2.1 G/DL (ref 3.4–5)
ALBUMIN SERPL-MCNC: 2.2 G/DL (ref 3.4–5)
ALBUMIN SERPL-MCNC: 2.2 G/DL (ref 3.4–5)
ALBUMIN SERPL-MCNC: 2.3 G/DL (ref 3.4–5)
ALBUMIN SERPL-MCNC: 2.4 G/DL (ref 3.4–5)
ALBUMIN SERPL-MCNC: 3.1 G/DL (ref 3.4–5)
ALBUMIN/GLOB SERPL: 0.6 {RATIO} (ref 1–2)
ALBUMIN/GLOB SERPL: 0.6 {RATIO} (ref 1–2)
ALBUMIN/GLOB SERPL: 0.8 {RATIO} (ref 1–2)
ALP LIVER SERPL-CCNC: 1309 U/L
ALP LIVER SERPL-CCNC: 1435 U/L
ALP LIVER SERPL-CCNC: 449 U/L
ALP LIVER SERPL-CCNC: 692 U/L
ALP LIVER SERPL-CCNC: 802 U/L
ALT SERPL-CCNC: 13 U/L
ALT SERPL-CCNC: 21 U/L
ALT SERPL-CCNC: 31 U/L
ALT SERPL-CCNC: 38 U/L
ALT SERPL-CCNC: 53 U/L
ANION GAP SERPL CALC-SCNC: 10 MMOL/L (ref 0–18)
ANION GAP SERPL CALC-SCNC: 10 MMOL/L (ref 0–18)
ANION GAP SERPL CALC-SCNC: 11 MMOL/L (ref 0–18)
ANION GAP SERPL CALC-SCNC: 12 MMOL/L (ref 0–18)
ANION GAP SERPL CALC-SCNC: 17 MMOL/L (ref 0–18)
ANION GAP SERPL CALC-SCNC: 6 MMOL/L (ref 0–18)
ANION GAP SERPL CALC-SCNC: 7 MMOL/L (ref 0–18)
ANION GAP SERPL CALC-SCNC: 7 MMOL/L (ref 0–18)
ANION GAP SERPL CALC-SCNC: 8 MMOL/L (ref 0–18)
ANION GAP SERPL CALC-SCNC: 9 MMOL/L (ref 0–18)
ANION GAP SERPL CALC-SCNC: 9 MMOL/L (ref 0–18)
ANTIBODY SCREEN: NEGATIVE
APTT PPP: 42.2 SECONDS (ref 23.2–35.3)
AST SERPL-CCNC: 165 U/L (ref 15–37)
AST SERPL-CCNC: 34 U/L (ref 15–37)
AST SERPL-CCNC: 417 U/L (ref 15–37)
AST SERPL-CCNC: 520 U/L (ref 15–37)
AST SERPL-CCNC: 68 U/L (ref 15–37)
BASE EXCESS BLD CALC-SCNC: -13.7 MMOL/L (ref ?–2)
BASOPHILS # BLD AUTO: 0.01 X10(3) UL (ref 0–0.2)
BASOPHILS # BLD AUTO: 0.02 X10(3) UL (ref 0–0.2)
BASOPHILS # BLD AUTO: 0.03 X10(3) UL (ref 0–0.2)
BASOPHILS # BLD: 0 X10(3) UL (ref 0–0.2)
BASOPHILS # BLD: 0.03 X10(3) UL (ref 0–0.2)
BASOPHILS # BLD: 0.09 X10(3) UL (ref 0–0.2)
BASOPHILS NFR BLD AUTO: 0.2 %
BASOPHILS NFR BLD AUTO: 0.4 %
BASOPHILS NFR BLD AUTO: 0.4 %
BASOPHILS NFR BLD: 0 %
BASOPHILS NFR BLD: 1 %
BASOPHILS NFR BLD: 1 %
BILIRUB DIRECT SERPL-MCNC: 0.2 MG/DL (ref 0–0.2)
BILIRUB DIRECT SERPL-MCNC: 0.2 MG/DL (ref 0–0.2)
BILIRUB DIRECT SERPL-MCNC: 3.5 MG/DL (ref 0–0.2)
BILIRUB SERPL-MCNC: 0.4 MG/DL (ref 0.1–2)
BILIRUB SERPL-MCNC: 0.5 MG/DL (ref 0.1–2)
BILIRUB SERPL-MCNC: 0.7 MG/DL (ref 0.1–2)
BILIRUB SERPL-MCNC: 4.1 MG/DL (ref 0.1–2)
BILIRUB UR QL: NEGATIVE
BILIRUB UR QL: NEGATIVE
BLOOD TYPE BARCODE: 5100
BLOOD TYPE BARCODE: 6200
BUN BLD-MCNC: 10 MG/DL (ref 7–18)
BUN BLD-MCNC: 11 MG/DL (ref 7–18)
BUN BLD-MCNC: 13 MG/DL (ref 7–18)
BUN BLD-MCNC: 14 MG/DL (ref 7–18)
BUN BLD-MCNC: 15 MG/DL (ref 7–18)
BUN BLD-MCNC: 16 MG/DL (ref 7–18)
BUN BLD-MCNC: 17 MG/DL (ref 7–18)
BUN BLD-MCNC: 17 MG/DL (ref 7–18)
BUN BLD-MCNC: 19 MG/DL (ref 7–18)
BUN BLD-MCNC: 21 MG/DL (ref 7–18)
BUN BLD-MCNC: 21 MG/DL (ref 7–18)
BUN BLD-MCNC: 22 MG/DL (ref 7–18)
BUN BLD-MCNC: 22 MG/DL (ref 7–18)
BUN BLD-MCNC: 23 MG/DL (ref 7–18)
BUN BLD-MCNC: 23 MG/DL (ref 7–18)
BUN BLD-MCNC: 27 MG/DL (ref 7–18)
BUN BLD-MCNC: 48 MG/DL (ref 7–18)
BUN/CREAT SERPL: 18.1 (ref 10–20)
BUN/CREAT SERPL: 18.2 (ref 10–20)
BUN/CREAT SERPL: 22 (ref 10–20)
BUN/CREAT SERPL: 24.1 (ref 10–20)
BUN/CREAT SERPL: 24.1 (ref 10–20)
BUN/CREAT SERPL: 27.2 (ref 10–20)
BUN/CREAT SERPL: 30.6 (ref 10–20)
BUN/CREAT SERPL: 32.6 (ref 10–20)
BUN/CREAT SERPL: 34.4 (ref 10–20)
BUN/CREAT SERPL: 37.9 (ref 10–20)
BUN/CREAT SERPL: 40.9 (ref 10–20)
BUN/CREAT SERPL: 41.1 (ref 10–20)
BUN/CREAT SERPL: 42.5 (ref 10–20)
BUN/CREAT SERPL: 43.2 (ref 10–20)
BUN/CREAT SERPL: 51.2 (ref 10–20)
BUN/CREAT SERPL: 57.5 (ref 10–20)
BUN/CREAT SERPL: 57.6 (ref 10–20)
CALCIUM BLD-MCNC: 10.1 MG/DL (ref 8.5–10.1)
CALCIUM BLD-MCNC: 10.4 MG/DL (ref 8.5–10.1)
CALCIUM BLD-MCNC: 11.5 MG/DL (ref 8.5–10.1)
CALCIUM BLD-MCNC: 5.7 MG/DL (ref 8.5–10.1)
CALCIUM BLD-MCNC: 5.7 MG/DL (ref 8.5–10.1)
CALCIUM BLD-MCNC: 6 MG/DL (ref 8.5–10.1)
CALCIUM BLD-MCNC: 6 MG/DL (ref 8.5–10.1)
CALCIUM BLD-MCNC: 6.1 MG/DL (ref 8.5–10.1)
CALCIUM BLD-MCNC: 6.3 MG/DL (ref 8.5–10.1)
CALCIUM BLD-MCNC: 6.3 MG/DL (ref 8.5–10.1)
CALCIUM BLD-MCNC: 6.5 MG/DL (ref 8.5–10.1)
CALCIUM BLD-MCNC: 6.6 MG/DL (ref 8.5–10.1)
CALCIUM BLD-MCNC: 7.3 MG/DL (ref 8.5–10.1)
CALCIUM BLD-MCNC: 9 MG/DL (ref 8.5–10.1)
CALCIUM BLD-MCNC: 9.8 MG/DL (ref 8.5–10.1)
CALCIUM BLD-MCNC: 9.9 MG/DL (ref 8.5–10.1)
CALCIUM BLD-MCNC: 9.9 MG/DL (ref 8.5–10.1)
CHLORIDE SERPL-SCNC: 101 MMOL/L (ref 98–112)
CHLORIDE SERPL-SCNC: 101 MMOL/L (ref 98–112)
CHLORIDE SERPL-SCNC: 104 MMOL/L (ref 98–112)
CHLORIDE SERPL-SCNC: 107 MMOL/L (ref 98–112)
CHLORIDE SERPL-SCNC: 108 MMOL/L (ref 98–112)
CHLORIDE SERPL-SCNC: 108 MMOL/L (ref 98–112)
CHLORIDE SERPL-SCNC: 111 MMOL/L (ref 98–112)
CHLORIDE SERPL-SCNC: 111 MMOL/L (ref 98–112)
CHLORIDE SERPL-SCNC: 112 MMOL/L (ref 98–112)
CHLORIDE SERPL-SCNC: 113 MMOL/L (ref 98–112)
CHLORIDE SERPL-SCNC: 114 MMOL/L (ref 98–112)
CHLORIDE SERPL-SCNC: 114 MMOL/L (ref 98–112)
CHLORIDE SERPL-SCNC: 117 MMOL/L (ref 98–112)
CHOLEST SMN-MCNC: 71 MG/DL (ref ?–200)
CHOLEST SMN-MCNC: 97 MG/DL (ref ?–200)
CHOLEST SMN-MCNC: 98 MG/DL (ref ?–200)
CLARITY UR: CLEAR
CO2 SERPL-SCNC: 15 MMOL/L (ref 21–32)
CO2 SERPL-SCNC: 18 MMOL/L (ref 21–32)
CO2 SERPL-SCNC: 19 MMOL/L (ref 21–32)
CO2 SERPL-SCNC: 20 MMOL/L (ref 21–32)
CO2 SERPL-SCNC: 21 MMOL/L (ref 21–32)
CO2 SERPL-SCNC: 22 MMOL/L (ref 21–32)
CO2 SERPL-SCNC: 22 MMOL/L (ref 21–32)
CO2 SERPL-SCNC: 23 MMOL/L (ref 21–32)
CO2 SERPL-SCNC: 24 MMOL/L (ref 21–32)
CO2 SERPL-SCNC: 26 MMOL/L (ref 21–32)
COLOR UR: YELLOW
COLOR UR: YELLOW
CREAT BLD-MCNC: 0.33 MG/DL
CREAT BLD-MCNC: 0.36 MG/DL
CREAT BLD-MCNC: 0.37 MG/DL
CREAT BLD-MCNC: 0.4 MG/DL
CREAT BLD-MCNC: 0.4 MG/DL
CREAT BLD-MCNC: 0.41 MG/DL
CREAT BLD-MCNC: 0.46 MG/DL
CREAT BLD-MCNC: 0.54 MG/DL
CREAT BLD-MCNC: 0.55 MG/DL
CREAT BLD-MCNC: 0.56 MG/DL
CREAT BLD-MCNC: 0.58 MG/DL
CREAT BLD-MCNC: 0.58 MG/DL
CREAT BLD-MCNC: 0.61 MG/DL
CREAT BLD-MCNC: 0.66 MG/DL
CREAT BLD-MCNC: 0.81 MG/DL
CREAT BLD-MCNC: 0.94 MG/DL
CREAT BLD-MCNC: 2.18 MG/DL
D DIMER PPP FEU-MCNC: 5.28 UG/ML FEU (ref ?–0.72)
DEPRECATED HBV CORE AB SER IA-ACNC: 7150.9 NG/ML
DEPRECATED RDW RBC AUTO: 42.9 FL (ref 35.1–46.3)
DEPRECATED RDW RBC AUTO: 47 FL (ref 35.1–46.3)
DEPRECATED RDW RBC AUTO: 49.2 FL (ref 35.1–46.3)
DEPRECATED RDW RBC AUTO: 51.5 FL (ref 35.1–46.3)
DEPRECATED RDW RBC AUTO: 51.8 FL (ref 35.1–46.3)
DEPRECATED RDW RBC AUTO: 54.3 FL (ref 35.1–46.3)
DEPRECATED RDW RBC AUTO: 54.6 FL (ref 35.1–46.3)
DEPRECATED RDW RBC AUTO: 55.5 FL (ref 35.1–46.3)
DEPRECATED RDW RBC AUTO: 55.5 FL (ref 35.1–46.3)
DEPRECATED RDW RBC AUTO: 55.8 FL (ref 35.1–46.3)
DEPRECATED RDW RBC AUTO: 56 FL (ref 35.1–46.3)
DEPRECATED RDW RBC AUTO: 56.8 FL (ref 35.1–46.3)
DEPRECATED RDW RBC AUTO: 57.1 FL (ref 35.1–46.3)
DEPRECATED RDW RBC AUTO: 57.1 FL (ref 35.1–46.3)
DEPRECATED RDW RBC AUTO: 57.2 FL (ref 35.1–46.3)
DEPRECATED RDW RBC AUTO: 57.5 FL (ref 35.1–46.3)
DEPRECATED RDW RBC AUTO: 57.7 FL (ref 35.1–46.3)
DEPRECATED RDW RBC AUTO: 57.8 FL (ref 35.1–46.3)
DEPRECATED RDW RBC AUTO: 58.1 FL (ref 35.1–46.3)
DEPRECATED RDW RBC AUTO: 58.8 FL (ref 35.1–46.3)
DIRECT COOMBS POLY: NEGATIVE
EOSINOPHIL # BLD AUTO: 0.01 X10(3) UL (ref 0–0.7)
EOSINOPHIL # BLD AUTO: 0.03 X10(3) UL (ref 0–0.7)
EOSINOPHIL # BLD AUTO: 0.15 X10(3) UL (ref 0–0.7)
EOSINOPHIL # BLD: 0 X10(3) UL (ref 0–0.7)
EOSINOPHIL # BLD: 0.03 X10(3) UL (ref 0–0.7)
EOSINOPHIL # BLD: 0.03 X10(3) UL (ref 0–0.7)
EOSINOPHIL # BLD: 0.05 X10(3) UL (ref 0–0.7)
EOSINOPHIL # BLD: 0.08 X10(3) UL (ref 0–0.7)
EOSINOPHIL # BLD: 0.18 X10(3) UL (ref 0–0.7)
EOSINOPHIL NFR BLD AUTO: 0.2 %
EOSINOPHIL NFR BLD AUTO: 0.6 %
EOSINOPHIL NFR BLD AUTO: 2.2 %
EOSINOPHIL NFR BLD: 0 %
EOSINOPHIL NFR BLD: 1 %
EOSINOPHIL NFR BLD: 2 %
EOSINOPHIL NFR BLD: 2 %
ERYTHROCYTE [DISTWIDTH] IN BLOOD BY AUTOMATED COUNT: 13.2 % (ref 11–15)
ERYTHROCYTE [DISTWIDTH] IN BLOOD BY AUTOMATED COUNT: 14.7 % (ref 11–15)
ERYTHROCYTE [DISTWIDTH] IN BLOOD BY AUTOMATED COUNT: 15.5 % (ref 11–15)
ERYTHROCYTE [DISTWIDTH] IN BLOOD BY AUTOMATED COUNT: 16.6 % (ref 11–15)
ERYTHROCYTE [DISTWIDTH] IN BLOOD BY AUTOMATED COUNT: 16.8 % (ref 11–15)
ERYTHROCYTE [DISTWIDTH] IN BLOOD BY AUTOMATED COUNT: 16.9 % (ref 11–15)
ERYTHROCYTE [DISTWIDTH] IN BLOOD BY AUTOMATED COUNT: 17.1 % (ref 11–15)
ERYTHROCYTE [DISTWIDTH] IN BLOOD BY AUTOMATED COUNT: 17.2 % (ref 11–15)
ERYTHROCYTE [DISTWIDTH] IN BLOOD BY AUTOMATED COUNT: 17.2 % (ref 11–15)
ERYTHROCYTE [DISTWIDTH] IN BLOOD BY AUTOMATED COUNT: 17.3 % (ref 11–15)
ERYTHROCYTE [DISTWIDTH] IN BLOOD BY AUTOMATED COUNT: 17.4 % (ref 11–15)
ERYTHROCYTE [DISTWIDTH] IN BLOOD BY AUTOMATED COUNT: 17.5 % (ref 11–15)
ERYTHROCYTE [DISTWIDTH] IN BLOOD BY AUTOMATED COUNT: 17.7 % (ref 11–15)
ERYTHROCYTE [DISTWIDTH] IN BLOOD BY AUTOMATED COUNT: 17.8 % (ref 11–15)
ERYTHROCYTE [DISTWIDTH] IN BLOOD BY AUTOMATED COUNT: 17.9 % (ref 11–15)
ERYTHROCYTE [DISTWIDTH] IN BLOOD BY AUTOMATED COUNT: 18 % (ref 11–15)
ERYTHROCYTE [DISTWIDTH] IN BLOOD BY AUTOMATED COUNT: 18 % (ref 11–15)
ERYTHROCYTE [DISTWIDTH] IN BLOOD BY AUTOMATED COUNT: 18.1 % (ref 11–15)
EST. AVERAGE GLUCOSE BLD GHB EST-MCNC: 177 MG/DL (ref 68–126)
FIBRINOGEN PPP-MCNC: 525 MG/DL (ref 176–491)
GLOBULIN PLAS-MCNC: 3.4 G/DL (ref 2.8–4.4)
GLOBULIN PLAS-MCNC: 3.7 G/DL (ref 2.8–4.4)
GLOBULIN PLAS-MCNC: 4.1 G/DL (ref 2.8–4.4)
GLUCOSE BLD-MCNC: 108 MG/DL (ref 70–99)
GLUCOSE BLD-MCNC: 112 MG/DL (ref 70–99)
GLUCOSE BLD-MCNC: 121 MG/DL (ref 70–99)
GLUCOSE BLD-MCNC: 123 MG/DL (ref 70–99)
GLUCOSE BLD-MCNC: 134 MG/DL (ref 70–99)
GLUCOSE BLD-MCNC: 143 MG/DL (ref 70–99)
GLUCOSE BLD-MCNC: 150 MG/DL (ref 70–99)
GLUCOSE BLD-MCNC: 151 MG/DL (ref 70–99)
GLUCOSE BLD-MCNC: 168 MG/DL (ref 70–99)
GLUCOSE BLD-MCNC: 174 MG/DL (ref 70–99)
GLUCOSE BLD-MCNC: 180 MG/DL (ref 70–99)
GLUCOSE BLD-MCNC: 182 MG/DL (ref 70–99)
GLUCOSE BLD-MCNC: 86 MG/DL (ref 70–99)
GLUCOSE BLDC GLUCOMTR-MCNC: 106 MG/DL (ref 70–99)
GLUCOSE BLDC GLUCOMTR-MCNC: 106 MG/DL (ref 70–99)
GLUCOSE BLDC GLUCOMTR-MCNC: 107 MG/DL (ref 70–99)
GLUCOSE BLDC GLUCOMTR-MCNC: 107 MG/DL (ref 70–99)
GLUCOSE BLDC GLUCOMTR-MCNC: 109 MG/DL (ref 70–99)
GLUCOSE BLDC GLUCOMTR-MCNC: 110 MG/DL (ref 70–99)
GLUCOSE BLDC GLUCOMTR-MCNC: 111 MG/DL (ref 70–99)
GLUCOSE BLDC GLUCOMTR-MCNC: 112 MG/DL (ref 70–99)
GLUCOSE BLDC GLUCOMTR-MCNC: 112 MG/DL (ref 70–99)
GLUCOSE BLDC GLUCOMTR-MCNC: 113 MG/DL (ref 70–99)
GLUCOSE BLDC GLUCOMTR-MCNC: 116 MG/DL (ref 70–99)
GLUCOSE BLDC GLUCOMTR-MCNC: 119 MG/DL (ref 70–99)
GLUCOSE BLDC GLUCOMTR-MCNC: 120 MG/DL (ref 70–99)
GLUCOSE BLDC GLUCOMTR-MCNC: 121 MG/DL (ref 70–99)
GLUCOSE BLDC GLUCOMTR-MCNC: 121 MG/DL (ref 70–99)
GLUCOSE BLDC GLUCOMTR-MCNC: 122 MG/DL (ref 70–99)
GLUCOSE BLDC GLUCOMTR-MCNC: 122 MG/DL (ref 70–99)
GLUCOSE BLDC GLUCOMTR-MCNC: 125 MG/DL (ref 70–99)
GLUCOSE BLDC GLUCOMTR-MCNC: 127 MG/DL (ref 70–99)
GLUCOSE BLDC GLUCOMTR-MCNC: 129 MG/DL (ref 70–99)
GLUCOSE BLDC GLUCOMTR-MCNC: 132 MG/DL (ref 70–99)
GLUCOSE BLDC GLUCOMTR-MCNC: 133 MG/DL (ref 70–99)
GLUCOSE BLDC GLUCOMTR-MCNC: 137 MG/DL (ref 70–99)
GLUCOSE BLDC GLUCOMTR-MCNC: 138 MG/DL (ref 70–99)
GLUCOSE BLDC GLUCOMTR-MCNC: 139 MG/DL (ref 70–99)
GLUCOSE BLDC GLUCOMTR-MCNC: 144 MG/DL (ref 70–99)
GLUCOSE BLDC GLUCOMTR-MCNC: 144 MG/DL (ref 70–99)
GLUCOSE BLDC GLUCOMTR-MCNC: 145 MG/DL (ref 70–99)
GLUCOSE BLDC GLUCOMTR-MCNC: 147 MG/DL (ref 70–99)
GLUCOSE BLDC GLUCOMTR-MCNC: 152 MG/DL (ref 70–99)
GLUCOSE BLDC GLUCOMTR-MCNC: 155 MG/DL (ref 70–99)
GLUCOSE BLDC GLUCOMTR-MCNC: 155 MG/DL (ref 70–99)
GLUCOSE BLDC GLUCOMTR-MCNC: 161 MG/DL (ref 70–99)
GLUCOSE BLDC GLUCOMTR-MCNC: 163 MG/DL (ref 70–99)
GLUCOSE BLDC GLUCOMTR-MCNC: 168 MG/DL (ref 70–99)
GLUCOSE BLDC GLUCOMTR-MCNC: 170 MG/DL (ref 70–99)
GLUCOSE BLDC GLUCOMTR-MCNC: 171 MG/DL (ref 70–99)
GLUCOSE BLDC GLUCOMTR-MCNC: 173 MG/DL (ref 70–99)
GLUCOSE BLDC GLUCOMTR-MCNC: 173 MG/DL (ref 70–99)
GLUCOSE BLDC GLUCOMTR-MCNC: 175 MG/DL (ref 70–99)
GLUCOSE BLDC GLUCOMTR-MCNC: 178 MG/DL (ref 70–99)
GLUCOSE BLDC GLUCOMTR-MCNC: 180 MG/DL (ref 70–99)
GLUCOSE BLDC GLUCOMTR-MCNC: 185 MG/DL (ref 70–99)
GLUCOSE BLDC GLUCOMTR-MCNC: 185 MG/DL (ref 70–99)
GLUCOSE BLDC GLUCOMTR-MCNC: 188 MG/DL (ref 70–99)
GLUCOSE BLDC GLUCOMTR-MCNC: 196 MG/DL (ref 70–99)
GLUCOSE BLDC GLUCOMTR-MCNC: 201 MG/DL (ref 70–99)
GLUCOSE BLDC GLUCOMTR-MCNC: 214 MG/DL (ref 70–99)
GLUCOSE BLDC GLUCOMTR-MCNC: 216 MG/DL (ref 70–99)
GLUCOSE BLDC GLUCOMTR-MCNC: 222 MG/DL (ref 70–99)
GLUCOSE BLDC GLUCOMTR-MCNC: 229 MG/DL (ref 70–99)
GLUCOSE BLDC GLUCOMTR-MCNC: 231 MG/DL (ref 70–99)
GLUCOSE BLDC GLUCOMTR-MCNC: 247 MG/DL (ref 70–99)
GLUCOSE BLDC GLUCOMTR-MCNC: 253 MG/DL (ref 70–99)
GLUCOSE BLDC GLUCOMTR-MCNC: 270 MG/DL (ref 70–99)
GLUCOSE BLDC GLUCOMTR-MCNC: 302 MG/DL (ref 70–99)
GLUCOSE UR-MCNC: NEGATIVE MG/DL
GLUCOSE UR-MCNC: NEGATIVE MG/DL
HAPTOGLOB SERPL-MCNC: 485 MG/DL (ref 30–200)
HAV IGM SER QL: 1.5 MG/DL (ref 1.6–2.6)
HAV IGM SER QL: 1.5 MG/DL (ref 1.6–2.6)
HAV IGM SER QL: 1.6 MG/DL (ref 1.6–2.6)
HAV IGM SER QL: 1.7 MG/DL (ref 1.6–2.6)
HAV IGM SER QL: 1.8 MG/DL (ref 1.6–2.6)
HBA1C MFR BLD HPLC: 7.8 % (ref ?–5.7)
HCO3 BLDA-SCNC: 14.2 MEQ/L (ref 21–27)
HCT VFR BLD AUTO: 17.8 %
HCT VFR BLD AUTO: 18 %
HCT VFR BLD AUTO: 18.7 %
HCT VFR BLD AUTO: 20.6 %
HCT VFR BLD AUTO: 21.7 %
HCT VFR BLD AUTO: 21.9 %
HCT VFR BLD AUTO: 22.1 %
HCT VFR BLD AUTO: 23 %
HCT VFR BLD AUTO: 23.1 %
HCT VFR BLD AUTO: 23.7 %
HCT VFR BLD AUTO: 25.2 %
HCT VFR BLD AUTO: 25.7 %
HCT VFR BLD AUTO: 26 %
HCT VFR BLD AUTO: 26.1 %
HCT VFR BLD AUTO: 26.2 %
HCT VFR BLD AUTO: 27.2 %
HCT VFR BLD AUTO: 27.2 %
HCT VFR BLD AUTO: 27.3 %
HCT VFR BLD AUTO: 28.7 %
HCT VFR BLD AUTO: 31.5 %
HCT VFR BLD AUTO: 31.5 %
HCT VFR BLD AUTO: 33.9 %
HCT VFR BLD AUTO: 36 %
HDLC SERPL-MCNC: 11 MG/DL (ref 40–59)
HDLC SERPL-MCNC: 14 MG/DL (ref 40–59)
HDLC SERPL-MCNC: 38 MG/DL (ref 40–59)
HGB BLD-MCNC: 10.2 G/DL
HGB BLD-MCNC: 11.1 G/DL
HGB BLD-MCNC: 11.7 G/DL
HGB BLD-MCNC: 5.7 G/DL
HGB BLD-MCNC: 5.9 G/DL
HGB BLD-MCNC: 6 G/DL
HGB BLD-MCNC: 6.8 G/DL
HGB BLD-MCNC: 6.9 G/DL
HGB BLD-MCNC: 7 G/DL
HGB BLD-MCNC: 7.2 G/DL
HGB BLD-MCNC: 7.4 G/DL
HGB BLD-MCNC: 7.5 G/DL
HGB BLD-MCNC: 8.1 G/DL
HGB BLD-MCNC: 8.1 G/DL
HGB BLD-MCNC: 8.3 G/DL
HGB BLD-MCNC: 8.4 G/DL
HGB BLD-MCNC: 8.5 G/DL
HGB BLD-MCNC: 8.6 G/DL
HGB BLD-MCNC: 8.8 G/DL
HGB BLD-MCNC: 8.9 G/DL
HGB BLD-MCNC: 9 G/DL
HGB BLD-MCNC: 9.4 G/DL
HGB BLD-MCNC: 9.7 G/DL
HGB RETIC QN AUTO: 31.7 PG (ref 28.2–36.6)
HYALINE CASTS #/AREA URNS AUTO: PRESENT /LPF
IMM GRANULOCYTES # BLD AUTO: 0.12 X10(3) UL (ref 0–1)
IMM GRANULOCYTES # BLD AUTO: 0.13 X10(3) UL (ref 0–1)
IMM GRANULOCYTES # BLD AUTO: 0.19 X10(3) UL (ref 0–1)
IMM GRANULOCYTES NFR BLD: 1.9 %
IMM GRANULOCYTES NFR BLD: 2.5 %
IMM GRANULOCYTES NFR BLD: 4.5 %
IMM RETICS NFR: 0.28 RATIO (ref 0.1–0.3)
INR BLD: 1.33 (ref 0.9–1.2)
INR BLD: 1.96 (ref 0.9–1.2)
IRON SATURATION: 38 %
IRON SATURATION: 46 %
IRON SATURATION: 46 %
IRON SERPL-MCNC: 70 UG/DL
IRON SERPL-MCNC: 74 UG/DL
IRON SERPL-MCNC: 95 UG/DL
ISTAT ACTIVATED CLOTTING TIME: 197 SECONDS (ref 125–137)
ISTAT ACTIVATED CLOTTING TIME: 208 SECONDS (ref 125–137)
ISTAT ACTIVATED CLOTTING TIME: 213 SECONDS (ref 125–137)
ISTAT ACTIVATED CLOTTING TIME: 235 SECONDS (ref 125–137)
KETONES UR-MCNC: NEGATIVE MG/DL
KETONES UR-MCNC: NEGATIVE MG/DL
LACTATE SERPL-SCNC: 8.1 MMOL/L (ref 0.4–2)
LACTATE SERPL-SCNC: 9.8 MMOL/L (ref 0.4–2)
LDH SERPL L TO P-CCNC: 845 U/L
LDLC SERPL CALC-MCNC: 21 MG/DL (ref ?–100)
LDLC SERPL CALC-MCNC: 29 MG/DL (ref ?–100)
LDLC SERPL CALC-MCNC: 44 MG/DL (ref ?–100)
LEUKOCYTE ESTERASE UR QL STRIP.AUTO: NEGATIVE
LEUKOCYTE ESTERASE UR QL STRIP.AUTO: NEGATIVE
LYMPHOCYTES # BLD AUTO: 0.64 X10(3) UL (ref 1–4)
LYMPHOCYTES # BLD AUTO: 0.74 X10(3) UL (ref 1–4)
LYMPHOCYTES # BLD AUTO: 1.33 X10(3) UL (ref 1–4)
LYMPHOCYTES NFR BLD AUTO: 15.2 %
LYMPHOCYTES NFR BLD AUTO: 15.6 %
LYMPHOCYTES NFR BLD AUTO: 19.7 %
LYMPHOCYTES NFR BLD: 0.2 X10(3) UL (ref 1–4)
LYMPHOCYTES NFR BLD: 0.2 X10(3) UL (ref 1–4)
LYMPHOCYTES NFR BLD: 0.37 X10(3) UL (ref 1–4)
LYMPHOCYTES NFR BLD: 0.54 X10(3) UL (ref 1–4)
LYMPHOCYTES NFR BLD: 0.55 X10(3) UL (ref 1–4)
LYMPHOCYTES NFR BLD: 0.57 X10(3) UL (ref 1–4)
LYMPHOCYTES NFR BLD: 0.58 X10(3) UL (ref 1–4)
LYMPHOCYTES NFR BLD: 0.61 X10(3) UL (ref 1–4)
LYMPHOCYTES NFR BLD: 0.79 X10(3) UL (ref 1–4)
LYMPHOCYTES NFR BLD: 0.91 X10(3) UL (ref 1–4)
LYMPHOCYTES NFR BLD: 10 %
LYMPHOCYTES NFR BLD: 11 %
LYMPHOCYTES NFR BLD: 13 %
LYMPHOCYTES NFR BLD: 14 %
LYMPHOCYTES NFR BLD: 15 %
LYMPHOCYTES NFR BLD: 19 %
LYMPHOCYTES NFR BLD: 19 %
LYMPHOCYTES NFR BLD: 6 %
LYMPHOCYTES NFR BLD: 6 %
LYMPHOCYTES NFR BLD: 88 %
LYMPHOCYTES NFR BLD: 9 %
LYMPHOCYTES NFR BLD: 9 %
M PROTEIN MFR SERPL ELPH: 4.7 G/DL (ref 6.4–8.2)
M PROTEIN MFR SERPL ELPH: 5.6 G/DL (ref 6.4–8.2)
M PROTEIN MFR SERPL ELPH: 5.9 G/DL (ref 6.4–8.2)
M PROTEIN MFR SERPL ELPH: 6.1 G/DL (ref 6.4–8.2)
M PROTEIN MFR SERPL ELPH: 7.2 G/DL (ref 6.4–8.2)
MCH RBC QN AUTO: 27.6 PG (ref 26–34)
MCH RBC QN AUTO: 28.2 PG (ref 26–34)
MCH RBC QN AUTO: 28.4 PG (ref 26–34)
MCH RBC QN AUTO: 28.5 PG (ref 26–34)
MCH RBC QN AUTO: 28.5 PG (ref 26–34)
MCH RBC QN AUTO: 28.7 PG (ref 26–34)
MCH RBC QN AUTO: 28.8 PG (ref 26–34)
MCH RBC QN AUTO: 28.9 PG (ref 26–34)
MCH RBC QN AUTO: 28.9 PG (ref 26–34)
MCH RBC QN AUTO: 29.1 PG (ref 26–34)
MCH RBC QN AUTO: 29.1 PG (ref 26–34)
MCH RBC QN AUTO: 29.2 PG (ref 26–34)
MCH RBC QN AUTO: 29.3 PG (ref 26–34)
MCH RBC QN AUTO: 29.4 PG (ref 26–34)
MCH RBC QN AUTO: 29.4 PG (ref 26–34)
MCHC RBC AUTO-ENTMCNC: 30.8 G/DL (ref 31–37)
MCHC RBC AUTO-ENTMCNC: 31.5 G/DL (ref 31–37)
MCHC RBC AUTO-ENTMCNC: 31.7 G/DL (ref 31–37)
MCHC RBC AUTO-ENTMCNC: 31.8 G/DL (ref 31–37)
MCHC RBC AUTO-ENTMCNC: 31.8 G/DL (ref 31–37)
MCHC RBC AUTO-ENTMCNC: 32.1 G/DL (ref 31–37)
MCHC RBC AUTO-ENTMCNC: 32.1 G/DL (ref 31–37)
MCHC RBC AUTO-ENTMCNC: 32.4 G/DL (ref 31–37)
MCHC RBC AUTO-ENTMCNC: 32.4 G/DL (ref 31–37)
MCHC RBC AUTO-ENTMCNC: 32.5 G/DL (ref 31–37)
MCHC RBC AUTO-ENTMCNC: 32.5 G/DL (ref 31–37)
MCHC RBC AUTO-ENTMCNC: 32.6 G/DL (ref 31–37)
MCHC RBC AUTO-ENTMCNC: 32.7 G/DL (ref 31–37)
MCHC RBC AUTO-ENTMCNC: 32.7 G/DL (ref 31–37)
MCHC RBC AUTO-ENTMCNC: 32.8 G/DL (ref 31–37)
MCHC RBC AUTO-ENTMCNC: 32.8 G/DL (ref 31–37)
MCHC RBC AUTO-ENTMCNC: 33 G/DL (ref 31–37)
MCHC RBC AUTO-ENTMCNC: 33.1 G/DL (ref 31–37)
MCV RBC AUTO: 86.9 FL
MCV RBC AUTO: 87.4 FL
MCV RBC AUTO: 87.7 FL
MCV RBC AUTO: 87.8 FL
MCV RBC AUTO: 88.3 FL
MCV RBC AUTO: 88.8 FL
MCV RBC AUTO: 89 FL
MCV RBC AUTO: 89.1 FL
MCV RBC AUTO: 89.2 FL
MCV RBC AUTO: 89.4 FL
MCV RBC AUTO: 89.5 FL
MCV RBC AUTO: 89.7 FL
MCV RBC AUTO: 89.9 FL
MCV RBC AUTO: 90 FL
MCV RBC AUTO: 90.1 FL
MCV RBC AUTO: 90.2 FL
MCV RBC AUTO: 91.9 FL
MCV RBC AUTO: 94.9 FL
METAMYELOCYTES # BLD: 0.03 X10(3) UL
METAMYELOCYTES # BLD: 0.05 X10(3) UL
METAMYELOCYTES # BLD: 0.05 X10(3) UL
METAMYELOCYTES # BLD: 0.06 X10(3) UL
METAMYELOCYTES # BLD: 0.07 X10(3) UL
METAMYELOCYTES # BLD: 0.09 X10(3) UL
METAMYELOCYTES NFR BLD: 1 %
METAMYELOCYTES NFR BLD: 2 %
MONOCYTES # BLD AUTO: 0.38 X10(3) UL (ref 0.1–1)
MONOCYTES # BLD AUTO: 0.44 X10(3) UL (ref 0.1–1)
MONOCYTES # BLD AUTO: 0.47 X10(3) UL (ref 0.1–1)
MONOCYTES # BLD: 0.03 X10(3) UL (ref 0.1–1)
MONOCYTES # BLD: 0.04 X10(3) UL (ref 0.1–1)
MONOCYTES # BLD: 0.07 X10(3) UL (ref 0.1–1)
MONOCYTES # BLD: 0.12 X10(3) UL (ref 0.1–1)
MONOCYTES # BLD: 0.13 X10(3) UL (ref 0.1–1)
MONOCYTES # BLD: 0.18 X10(3) UL (ref 0.1–1)
MONOCYTES # BLD: 0.22 X10(3) UL (ref 0.1–1)
MONOCYTES # BLD: 0.26 X10(3) UL (ref 0.1–1)
MONOCYTES # BLD: 0.26 X10(3) UL (ref 0.1–1)
MONOCYTES # BLD: 0.27 X10(3) UL (ref 0.1–1)
MONOCYTES # BLD: 0.35 X10(3) UL (ref 0.1–1)
MONOCYTES # BLD: 0.63 X10(3) UL (ref 0.1–1)
MONOCYTES NFR BLD AUTO: 6.5 %
MONOCYTES NFR BLD AUTO: 9 %
MONOCYTES NFR BLD AUTO: 9.9 %
MONOCYTES NFR BLD: 1 %
MONOCYTES NFR BLD: 2 %
MONOCYTES NFR BLD: 3 %
MONOCYTES NFR BLD: 4 %
MONOCYTES NFR BLD: 5 %
MONOCYTES NFR BLD: 5 %
MONOCYTES NFR BLD: 7 %
MONOCYTES NFR BLD: 8 %
MONOCYTES NFR BLD: 8 %
MONOCYTES NFR BLD: 9 %
MORPHOLOGY: NORMAL
MRSA DNA SPEC QL NAA+PROBE: NEGATIVE
MRSA DNA SPEC QL NAA+PROBE: NEGATIVE
MYELOCYTES # BLD: 0.04 X10(3) UL
MYELOCYTES # BLD: 0.09 X10(3) UL
MYELOCYTES # BLD: 0.18 X10(3) UL
MYELOCYTES NFR BLD: 2 %
NEUTROPHILS # BLD AUTO: 0.04 X10 (3) UL (ref 1.5–7.7)
NEUTROPHILS # BLD AUTO: 1.57 X10 (3) UL (ref 1.5–7.7)
NEUTROPHILS # BLD AUTO: 1.73 X10 (3) UL (ref 1.5–7.7)
NEUTROPHILS # BLD AUTO: 1.92 X10 (3) UL (ref 1.5–7.7)
NEUTROPHILS # BLD AUTO: 2.53 X10 (3) UL (ref 1.5–7.7)
NEUTROPHILS # BLD AUTO: 2.54 X10 (3) UL (ref 1.5–7.7)
NEUTROPHILS # BLD AUTO: 2.57 X10 (3) UL (ref 1.5–7.7)
NEUTROPHILS # BLD AUTO: 2.89 X10 (3) UL (ref 1.5–7.7)
NEUTROPHILS # BLD AUTO: 2.99 X10 (3) UL (ref 1.5–7.7)
NEUTROPHILS # BLD AUTO: 2.99 X10(3) UL (ref 1.5–7.7)
NEUTROPHILS # BLD AUTO: 3.26 X10 (3) UL (ref 1.5–7.7)
NEUTROPHILS # BLD AUTO: 3.35 X10 (3) UL (ref 1.5–7.7)
NEUTROPHILS # BLD AUTO: 3.35 X10(3) UL (ref 1.5–7.7)
NEUTROPHILS # BLD AUTO: 3.73 X10 (3) UL (ref 1.5–7.7)
NEUTROPHILS # BLD AUTO: 4.47 X10 (3) UL (ref 1.5–7.7)
NEUTROPHILS # BLD AUTO: 4.66 X10 (3) UL (ref 1.5–7.7)
NEUTROPHILS # BLD AUTO: 4.66 X10(3) UL (ref 1.5–7.7)
NEUTROPHILS # BLD AUTO: 5.61 X10 (3) UL (ref 1.5–7.7)
NEUTROPHILS NFR BLD AUTO: 69.3 %
NEUTROPHILS NFR BLD AUTO: 70.9 %
NEUTROPHILS NFR BLD AUTO: 71 %
NEUTROPHILS NFR BLD: 3 %
NEUTROPHILS NFR BLD: 59 %
NEUTROPHILS NFR BLD: 61 %
NEUTROPHILS NFR BLD: 66 %
NEUTROPHILS NFR BLD: 71 %
NEUTROPHILS NFR BLD: 72 %
NEUTROPHILS NFR BLD: 76 %
NEUTROPHILS NFR BLD: 79 %
NEUTROPHILS NFR BLD: 81 %
NEUTROPHILS NFR BLD: 83 %
NEUTROPHILS NFR BLD: 85 %
NEUTROPHILS NFR BLD: 87 %
NEUTS BAND NFR BLD: 1 %
NEUTS BAND NFR BLD: 11 %
NEUTS BAND NFR BLD: 11 %
NEUTS BAND NFR BLD: 17 %
NEUTS BAND NFR BLD: 3 %
NEUTS BAND NFR BLD: 3 %
NEUTS BAND NFR BLD: 5 %
NEUTS BAND NFR BLD: 7 %
NEUTS BAND NFR BLD: 9 %
NEUTS BAND NFR BLD: 9 %
NEUTS HYPERSEG # BLD: 0.04 X10(3) UL (ref 1.5–7.7)
NEUTS HYPERSEG # BLD: 1.91 X10(3) UL (ref 1.5–7.7)
NEUTS HYPERSEG # BLD: 1.97 X10(3) UL (ref 1.5–7.7)
NEUTS HYPERSEG # BLD: 2.43 X10(3) UL (ref 1.5–7.7)
NEUTS HYPERSEG # BLD: 2.88 X10(3) UL (ref 1.5–7.7)
NEUTS HYPERSEG # BLD: 3.04 X10(3) UL (ref 1.5–7.7)
NEUTS HYPERSEG # BLD: 3.39 X10(3) UL (ref 1.5–7.7)
NEUTS HYPERSEG # BLD: 3.53 X10(3) UL (ref 1.5–7.7)
NEUTS HYPERSEG # BLD: 4.35 X10(3) UL (ref 1.5–7.7)
NEUTS HYPERSEG # BLD: 4.59 X10(3) UL (ref 1.5–7.7)
NEUTS HYPERSEG # BLD: 5.27 X10(3) UL (ref 1.5–7.7)
NEUTS HYPERSEG # BLD: 7.29 X10(3) UL (ref 1.5–7.7)
NITRITE UR QL STRIP.AUTO: NEGATIVE
NITRITE UR QL STRIP.AUTO: NEGATIVE
NONHDLC SERPL-MCNC: 60 MG/DL (ref ?–130)
NONHDLC SERPL-MCNC: 60 MG/DL (ref ?–130)
NONHDLC SERPL-MCNC: 83 MG/DL (ref ?–130)
NRBC BLD MANUAL-RTO: 1 %
NT-PROBNP SERPL-MCNC: 7494 PG/ML (ref ?–125)
NT-PROBNP SERPL-MCNC: ABNORMAL PG/ML (ref ?–125)
O2 CT BLD-SCNC: 12.3 VOL% (ref 15–23)
O2/TOTAL GAS SETTING VFR VENT: 60 %
OSMOLALITY SERPL CALC.SUM OF ELEC: 288 MOSM/KG (ref 275–295)
OSMOLALITY SERPL CALC.SUM OF ELEC: 289 MOSM/KG (ref 275–295)
OSMOLALITY SERPL CALC.SUM OF ELEC: 289 MOSM/KG (ref 275–295)
OSMOLALITY SERPL CALC.SUM OF ELEC: 290 MOSM/KG (ref 275–295)
OSMOLALITY SERPL CALC.SUM OF ELEC: 291 MOSM/KG (ref 275–295)
OSMOLALITY SERPL CALC.SUM OF ELEC: 293 MOSM/KG (ref 275–295)
OSMOLALITY SERPL CALC.SUM OF ELEC: 295 MOSM/KG (ref 275–295)
OSMOLALITY SERPL CALC.SUM OF ELEC: 296 MOSM/KG (ref 275–295)
OSMOLALITY SERPL CALC.SUM OF ELEC: 301 MOSM/KG (ref 275–295)
OSMOLALITY SERPL CALC.SUM OF ELEC: 302 MOSM/KG (ref 275–295)
OSMOLALITY SERPL CALC.SUM OF ELEC: 302 MOSM/KG (ref 275–295)
OSMOLALITY SERPL CALC.SUM OF ELEC: 304 MOSM/KG (ref 275–295)
OSMOLALITY SERPL CALC.SUM OF ELEC: 306 MOSM/KG (ref 275–295)
OSMOLALITY SERPL CALC.SUM OF ELEC: 310 MOSM/KG (ref 275–295)
PATIENT FASTING Y/N/NP: NO
PCO2 BLDA: 38 MM HG (ref 35–45)
PEEP SETTING VENT: 5 CM H2O
PH BLDA: 7.17 [PH] (ref 7.35–7.45)
PH UR: 5 [PH] (ref 5–8)
PH UR: 6 [PH] (ref 5–8)
PHOSPHATE SERPL-MCNC: 1.2 MG/DL (ref 2.5–4.9)
PHOSPHATE SERPL-MCNC: 1.3 MG/DL (ref 2.5–4.9)
PHOSPHATE SERPL-MCNC: 1.4 MG/DL (ref 2.5–4.9)
PHOSPHATE SERPL-MCNC: 1.4 MG/DL (ref 2.5–4.9)
PHOSPHATE SERPL-MCNC: 1.5 MG/DL (ref 2.5–4.9)
PHOSPHATE SERPL-MCNC: 1.5 MG/DL (ref 2.5–4.9)
PHOSPHATE SERPL-MCNC: 1.6 MG/DL (ref 2.5–4.9)
PHOSPHATE SERPL-MCNC: 1.6 MG/DL (ref 2.5–4.9)
PHOSPHATE SERPL-MCNC: 1.7 MG/DL (ref 2.5–4.9)
PHOSPHATE SERPL-MCNC: 2 MG/DL (ref 2.5–4.9)
PLATELET # BLD AUTO: 102 10(3)UL (ref 150–450)
PLATELET # BLD AUTO: 108 10(3)UL (ref 150–450)
PLATELET # BLD AUTO: 126 10(3)UL (ref 150–450)
PLATELET # BLD AUTO: 134 10(3)UL (ref 150–450)
PLATELET # BLD AUTO: 140 10(3)UL (ref 150–450)
PLATELET # BLD AUTO: 147 10(3)UL (ref 150–450)
PLATELET # BLD AUTO: 15 10(3)UL (ref 150–450)
PLATELET # BLD AUTO: 154 10(3)UL (ref 150–450)
PLATELET # BLD AUTO: 213 10(3)UL (ref 150–450)
PLATELET # BLD AUTO: 35 10(3)UL (ref 150–450)
PLATELET # BLD AUTO: 48 10(3)UL (ref 150–450)
PLATELET # BLD AUTO: 57 10(3)UL (ref 150–450)
PLATELET # BLD AUTO: 58 10(3)UL (ref 150–450)
PLATELET # BLD AUTO: 65 10(3)UL (ref 150–450)
PLATELET # BLD AUTO: 65 10(3)UL (ref 150–450)
PLATELET # BLD AUTO: 72 10(3)UL (ref 150–450)
PLATELET # BLD AUTO: 75 10(3)UL (ref 150–450)
PLATELET # BLD AUTO: 90 10(3)UL (ref 150–450)
PLATELET # BLD AUTO: 93 10(3)UL (ref 150–450)
PLATELET MORPHOLOGY: NORMAL
PO2 BLDA: 106 MM HG (ref 80–100)
POTASSIUM SERPL-SCNC: 3.3 MMOL/L (ref 3.5–5.1)
POTASSIUM SERPL-SCNC: 3.4 MMOL/L (ref 3.5–5.1)
POTASSIUM SERPL-SCNC: 3.4 MMOL/L (ref 3.5–5.1)
POTASSIUM SERPL-SCNC: 3.5 MMOL/L (ref 3.5–5.1)
POTASSIUM SERPL-SCNC: 3.5 MMOL/L (ref 3.5–5.1)
POTASSIUM SERPL-SCNC: 3.6 MMOL/L (ref 3.5–5.1)
POTASSIUM SERPL-SCNC: 3.6 MMOL/L (ref 3.5–5.1)
POTASSIUM SERPL-SCNC: 3.7 MMOL/L (ref 3.5–5.1)
POTASSIUM SERPL-SCNC: 3.8 MMOL/L (ref 3.5–5.1)
POTASSIUM SERPL-SCNC: 3.8 MMOL/L (ref 3.5–5.1)
POTASSIUM SERPL-SCNC: 3.9 MMOL/L (ref 3.5–5.1)
POTASSIUM SERPL-SCNC: 3.9 MMOL/L (ref 3.5–5.1)
POTASSIUM SERPL-SCNC: 4.1 MMOL/L (ref 3.5–5.1)
POTASSIUM SERPL-SCNC: 4.2 MMOL/L (ref 3.5–5.1)
POTASSIUM SERPL-SCNC: 4.2 MMOL/L (ref 3.5–5.1)
POTASSIUM SERPL-SCNC: 5.2 MMOL/L (ref 3.5–5.1)
PROT UR-MCNC: 30 MG/DL
PROT UR-MCNC: 30 MG/DL
PROTHROMBIN TIME: 16.3 SECONDS (ref 11.8–14.5)
PROTHROMBIN TIME: 22.1 SECONDS (ref 11.8–14.5)
PSA SERPL-MCNC: 145 NG/ML (ref ?–4)
PSA SERPL-MCNC: 281 NG/ML (ref ?–4)
PTH-INTACT SERPL-MCNC: 346.4 PG/ML (ref 18.5–88)
PUNCTURE CHARGE: YES
RBC # BLD AUTO: 2.02 X10(6)UL
RBC # BLD AUTO: 2.08 X10(6)UL
RBC # BLD AUTO: 2.36 X10(6)UL
RBC # BLD AUTO: 2.37 X10(6)UL
RBC # BLD AUTO: 2.45 X10(6)UL
RBC # BLD AUTO: 2.57 X10(6)UL
RBC # BLD AUTO: 2.58 X10(6)UL
RBC # BLD AUTO: 2.8 X10(6)UL
RBC # BLD AUTO: 2.91 X10(6)UL
RBC # BLD AUTO: 2.92 X10(6)UL
RBC # BLD AUTO: 2.94 X10(6)UL
RBC # BLD AUTO: 2.95 X10(6)UL
RBC # BLD AUTO: 3.03 X10(6)UL
RBC # BLD AUTO: 3.04 X10(6)UL
RBC # BLD AUTO: 3.08 X10(6)UL
RBC # BLD AUTO: 3.21 X10(6)UL
RBC # BLD AUTO: 3.32 X10(6)UL
RBC # BLD AUTO: 3.59 X10(6)UL
RBC # BLD AUTO: 3.8 X10(6)UL
RBC # BLD AUTO: 4.1 X10(6)UL
RBC #/AREA URNS AUTO: >10 /HPF
RESP RATE: 12 BPM
RETICS # AUTO: 34.8 X10(3) UL (ref 22.5–147.5)
RETICS/RBC NFR AUTO: 1.4 %
RH BLOOD TYPE: POSITIVE
SAO2 % BLDA: 98.7 % (ref 94–100)
SARS-COV-2 RNA RESP QL NAA+PROBE: NOT DETECTED
SODIUM SERPL-SCNC: 133 MMOL/L (ref 136–145)
SODIUM SERPL-SCNC: 135 MMOL/L (ref 136–145)
SODIUM SERPL-SCNC: 137 MMOL/L (ref 136–145)
SODIUM SERPL-SCNC: 139 MMOL/L (ref 136–145)
SODIUM SERPL-SCNC: 140 MMOL/L (ref 136–145)
SODIUM SERPL-SCNC: 141 MMOL/L (ref 136–145)
SODIUM SERPL-SCNC: 143 MMOL/L (ref 136–145)
SODIUM SERPL-SCNC: 144 MMOL/L (ref 136–145)
SODIUM SERPL-SCNC: 146 MMOL/L (ref 136–145)
SODIUM SERPL-SCNC: 146 MMOL/L (ref 136–145)
SP GR UR STRIP: 1.01 (ref 1–1.03)
SP GR UR STRIP: 1.02 (ref 1–1.03)
SPECIMEN VOL 24H UR: 500 ML
TESTOST SERPL-MCNC: 8.59 NG/DL
TOTAL CELLS COUNTED: 100
TOTAL IRON BINDING CAPACITY: 162 UG/DL (ref 240–450)
TOTAL IRON BINDING CAPACITY: 182 UG/DL (ref 240–450)
TOTAL IRON BINDING CAPACITY: 207 UG/DL (ref 240–450)
TRANSFERRIN SERPL-MCNC: 109 MG/DL (ref 200–360)
TRANSFERRIN SERPL-MCNC: 122 MG/DL (ref 200–360)
TRANSFERRIN SERPL-MCNC: 139 MG/DL (ref 200–360)
TRIGL SERPL-MCNC: 196 MG/DL (ref 30–149)
TRIGL SERPL-MCNC: 243 MG/DL (ref 30–149)
TRIGL SERPL-MCNC: 256 MG/DL (ref 30–149)
TROPONIN I SERPL-MCNC: 0.05 NG/ML (ref ?–0.04)
TROPONIN I SERPL-MCNC: 0.56 NG/ML (ref ?–0.04)
TROPONIN I SERPL-MCNC: 0.73 NG/ML (ref ?–0.04)
TROPONIN I SERPL-MCNC: <0.045 NG/ML (ref ?–0.04)
UROBILINOGEN UR STRIP-ACNC: 2
UROBILINOGEN UR STRIP-ACNC: <2
VLDLC SERPL CALC-MCNC: 26 MG/DL (ref 0–30)
VLDLC SERPL CALC-MCNC: 33 MG/DL (ref 0–30)
VLDLC SERPL CALC-MCNC: 34 MG/DL (ref 0–30)
WBC # BLD AUTO: 0.9 X10(3) UL (ref 4–11)
WBC # BLD AUTO: 2.2 X10(3) UL (ref 4–11)
WBC # BLD AUTO: 2.9 X10(3) UL (ref 4–11)
WBC # BLD AUTO: 3.2 X10(3) UL (ref 4–11)
WBC # BLD AUTO: 3.2 X10(3) UL (ref 4–11)
WBC # BLD AUTO: 3.3 X10(3) UL (ref 4–11)
WBC # BLD AUTO: 3.5 X10(3) UL (ref 4–11)
WBC # BLD AUTO: 3.6 X10(3) UL (ref 4–11)
WBC # BLD AUTO: 4.1 X10(3) UL (ref 4–11)
WBC # BLD AUTO: 4.2 X10(3) UL (ref 4–11)
WBC # BLD AUTO: 4.4 X10(3) UL (ref 4–11)
WBC # BLD AUTO: 4.4 X10(3) UL (ref 4–11)
WBC # BLD AUTO: 4.6 X10(3) UL (ref 4–11)
WBC # BLD AUTO: 4.7 X10(3) UL (ref 4–11)
WBC # BLD AUTO: 5.3 X10(3) UL (ref 4–11)
WBC # BLD AUTO: 5.4 X10(3) UL (ref 4–11)
WBC # BLD AUTO: 6.5 X10(3) UL (ref 4–11)
WBC # BLD AUTO: 6.7 X10(3) UL (ref 4–11)
WBC # BLD AUTO: 7.7 X10(3) UL (ref 4–11)
WBC # BLD AUTO: 9 X10(3) UL (ref 4–11)

## 2021-01-01 PROCEDURE — 85730 THROMBOPLASTIN TIME PARTIAL: CPT

## 2021-01-01 PROCEDURE — 51702 INSERT TEMP BLADDER CATH: CPT

## 2021-01-01 PROCEDURE — 99233 SBSQ HOSP IP/OBS HIGH 50: CPT | Performed by: HOSPITALIST

## 2021-01-01 PROCEDURE — 36415 COLL VENOUS BLD VENIPUNCTURE: CPT

## 2021-01-01 PROCEDURE — 0VB08ZZ EXCISION OF PROSTATE, VIA NATURAL OR ARTIFICIAL OPENING ENDOSCOPIC: ICD-10-PCS | Performed by: UROLOGY

## 2021-01-01 PROCEDURE — 99223 1ST HOSP IP/OBS HIGH 75: CPT | Performed by: HOSPITALIST

## 2021-01-01 PROCEDURE — 84443 ASSAY THYROID STIM HORMONE: CPT

## 2021-01-01 PROCEDURE — 027034Z DILATION OF CORONARY ARTERY, ONE ARTERY WITH DRUG-ELUTING INTRALUMINAL DEVICE, PERCUTANEOUS APPROACH: ICD-10-PCS | Performed by: INTERNAL MEDICINE

## 2021-01-01 PROCEDURE — 99152 MOD SED SAME PHYS/QHP 5/>YRS: CPT

## 2021-01-01 PROCEDURE — 96372 THER/PROPH/DIAG INJ SC/IM: CPT

## 2021-01-01 PROCEDURE — 84153 ASSAY OF PSA TOTAL: CPT

## 2021-01-01 PROCEDURE — 93018 CV STRESS TEST I&R ONLY: CPT | Performed by: INTERNAL MEDICINE

## 2021-01-01 PROCEDURE — 4A023N7 MEASUREMENT OF CARDIAC SAMPLING AND PRESSURE, LEFT HEART, PERCUTANEOUS APPROACH: ICD-10-PCS | Performed by: INTERNAL MEDICINE

## 2021-01-01 PROCEDURE — 85025 COMPLETE CBC W/AUTO DIFF WBC: CPT | Performed by: EMERGENCY MEDICINE

## 2021-01-01 PROCEDURE — 96402 CHEMO HORMON ANTINEOPL SQ/IM: CPT | Performed by: UROLOGY

## 2021-01-01 PROCEDURE — 85025 COMPLETE CBC W/AUTO DIFF WBC: CPT

## 2021-01-01 PROCEDURE — 99213 OFFICE O/P EST LOW 20 MIN: CPT | Performed by: INTERNAL MEDICINE

## 2021-01-01 PROCEDURE — 99284 EMERGENCY DEPT VISIT MOD MDM: CPT

## 2021-01-01 PROCEDURE — 99213 OFFICE O/P EST LOW 20 MIN: CPT | Performed by: NURSE PRACTITIONER

## 2021-01-01 PROCEDURE — 93306 TTE W/DOPPLER COMPLETE: CPT | Performed by: INTERNAL MEDICINE

## 2021-01-01 PROCEDURE — 30233R1 TRANSFUSION OF NONAUTOLOGOUS PLATELETS INTO PERIPHERAL VEIN, PERCUTANEOUS APPROACH: ICD-10-PCS | Performed by: HOSPITALIST

## 2021-01-01 PROCEDURE — 0DBP8ZX EXCISION OF RECTUM, VIA NATURAL OR ARTIFICIAL OPENING ENDOSCOPIC, DIAGNOSTIC: ICD-10-PCS | Performed by: INTERNAL MEDICINE

## 2021-01-01 PROCEDURE — B2131ZZ FLUOROSCOPY OF MULTIPLE CORONARY ARTERY BYPASS GRAFTS USING LOW OSMOLAR CONTRAST: ICD-10-PCS | Performed by: INTERNAL MEDICINE

## 2021-01-01 PROCEDURE — 77290 THER RAD SIMULAJ FIELD CPLX: CPT | Performed by: INTERNAL MEDICINE

## 2021-01-01 PROCEDURE — 30233N1 TRANSFUSION OF NONAUTOLOGOUS RED BLOOD CELLS INTO PERIPHERAL VEIN, PERCUTANEOUS APPROACH: ICD-10-PCS | Performed by: HOSPITALIST

## 2021-01-01 PROCEDURE — 93005 ELECTROCARDIOGRAM TRACING: CPT

## 2021-01-01 PROCEDURE — 3E043XZ INTRODUCTION OF VASOPRESSOR INTO CENTRAL VEIN, PERCUTANEOUS APPROACH: ICD-10-PCS | Performed by: INTERNAL MEDICINE

## 2021-01-01 PROCEDURE — 99205 OFFICE O/P NEW HI 60 MIN: CPT | Performed by: INTERNAL MEDICINE

## 2021-01-01 PROCEDURE — 96374 THER/PROPH/DIAG INJ IV PUSH: CPT

## 2021-01-01 PROCEDURE — 82306 VITAMIN D 25 HYDROXY: CPT

## 2021-01-01 PROCEDURE — 99203 OFFICE O/P NEW LOW 30 MIN: CPT | Performed by: INTERNAL MEDICINE

## 2021-01-01 PROCEDURE — 70490 CT SOFT TISSUE NECK W/O DYE: CPT | Performed by: INTERNAL MEDICINE

## 2021-01-01 PROCEDURE — 93970 EXTREMITY STUDY: CPT | Performed by: HOSPITALIST

## 2021-01-01 PROCEDURE — 86901 BLOOD TYPING SEROLOGIC RH(D): CPT

## 2021-01-01 PROCEDURE — 77333 RADIATION TREATMENT AID(S): CPT | Performed by: INTERNAL MEDICINE

## 2021-01-01 PROCEDURE — 99214 OFFICE O/P EST MOD 30 MIN: CPT | Performed by: NURSE PRACTITIONER

## 2021-01-01 PROCEDURE — 02H633Z INSERTION OF INFUSION DEVICE INTO RIGHT ATRIUM, PERCUTANEOUS APPROACH: ICD-10-PCS | Performed by: INTERNAL MEDICINE

## 2021-01-01 PROCEDURE — 71045 X-RAY EXAM CHEST 1 VIEW: CPT | Performed by: EMERGENCY MEDICINE

## 2021-01-01 PROCEDURE — 77334 RADIATION TREATMENT AID(S): CPT | Performed by: INTERNAL MEDICINE

## 2021-01-01 PROCEDURE — 43239 EGD BIOPSY SINGLE/MULTIPLE: CPT | Performed by: INTERNAL MEDICINE

## 2021-01-01 PROCEDURE — 99213 OFFICE O/P EST LOW 20 MIN: CPT | Performed by: UROLOGY

## 2021-01-01 PROCEDURE — 73630 X-RAY EXAM OF FOOT: CPT | Performed by: HOSPITALIST

## 2021-01-01 PROCEDURE — 0DBK8ZZ EXCISION OF ASCENDING COLON, VIA NATURAL OR ARTIFICIAL OPENING ENDOSCOPIC: ICD-10-PCS | Performed by: INTERNAL MEDICINE

## 2021-01-01 PROCEDURE — 99212 OFFICE O/P EST SF 10 MIN: CPT

## 2021-01-01 PROCEDURE — 82607 VITAMIN B-12: CPT

## 2021-01-01 PROCEDURE — 99232 SBSQ HOSP IP/OBS MODERATE 35: CPT | Performed by: INTERNAL MEDICINE

## 2021-01-01 PROCEDURE — 99283 EMERGENCY DEPT VISIT LOW MDM: CPT

## 2021-01-01 PROCEDURE — 0DBL8ZZ EXCISION OF TRANSVERSE COLON, VIA NATURAL OR ARTIFICIAL OPENING ENDOSCOPIC: ICD-10-PCS | Performed by: INTERNAL MEDICINE

## 2021-01-01 PROCEDURE — 86900 BLOOD TYPING SEROLOGIC ABO: CPT

## 2021-01-01 PROCEDURE — 84403 ASSAY OF TOTAL TESTOSTERONE: CPT

## 2021-01-01 PROCEDURE — 88363 XM ARCHIVE TISSUE MOLEC ANAL: CPT | Performed by: INTERNAL MEDICINE

## 2021-01-01 PROCEDURE — 45385 COLONOSCOPY W/LESION REMOVAL: CPT | Performed by: INTERNAL MEDICINE

## 2021-01-01 PROCEDURE — 93010 ELECTROCARDIOGRAM REPORT: CPT | Performed by: EMERGENCY MEDICINE

## 2021-01-01 PROCEDURE — 83540 ASSAY OF IRON: CPT

## 2021-01-01 PROCEDURE — 99223 1ST HOSP IP/OBS HIGH 75: CPT | Performed by: INTERNAL MEDICINE

## 2021-01-01 PROCEDURE — 85027 COMPLETE CBC AUTOMATED: CPT

## 2021-01-01 PROCEDURE — 77412 RADIATION TX DELIVERY LVL 3: CPT | Performed by: INTERNAL MEDICINE

## 2021-01-01 PROCEDURE — 99215 OFFICE O/P EST HI 40 MIN: CPT | Performed by: NURSE PRACTITIONER

## 2021-01-01 PROCEDURE — 80053 COMPREHEN METABOLIC PANEL: CPT

## 2021-01-01 PROCEDURE — 71250 CT THORAX DX C-: CPT | Performed by: INTERNAL MEDICINE

## 2021-01-01 PROCEDURE — 99232 SBSQ HOSP IP/OBS MODERATE 35: CPT | Performed by: PHYSICIAN ASSISTANT

## 2021-01-01 PROCEDURE — 77307 TELETHX ISODOSE PLAN CPLX: CPT | Performed by: INTERNAL MEDICINE

## 2021-01-01 PROCEDURE — 96375 TX/PRO/DX INJ NEW DRUG ADDON: CPT

## 2021-01-01 PROCEDURE — 85007 BL SMEAR W/DIFF WBC COUNT: CPT

## 2021-01-01 PROCEDURE — B2181ZZ FLUOROSCOPY OF LEFT INTERNAL MAMMARY BYPASS GRAFT USING LOW OSMOLAR CONTRAST: ICD-10-PCS | Performed by: INTERNAL MEDICINE

## 2021-01-01 PROCEDURE — 0001A SARSCOV2 VAC 30MCG/0.3ML IM: CPT

## 2021-01-01 PROCEDURE — 80048 BASIC METABOLIC PNL TOTAL CA: CPT

## 2021-01-01 PROCEDURE — 52601 PROSTATECTOMY (TURP): CPT | Performed by: UROLOGY

## 2021-01-01 PROCEDURE — 0DB98ZX EXCISION OF DUODENUM, VIA NATURAL OR ARTIFICIAL OPENING ENDOSCOPIC, DIAGNOSTIC: ICD-10-PCS | Performed by: INTERNAL MEDICINE

## 2021-01-01 PROCEDURE — 96360 HYDRATION IV INFUSION INIT: CPT

## 2021-01-01 PROCEDURE — 74176 CT ABD & PELVIS W/O CONTRAST: CPT | Performed by: EMERGENCY MEDICINE

## 2021-01-01 PROCEDURE — 73090 X-RAY EXAM OF FOREARM: CPT | Performed by: INTERNAL MEDICINE

## 2021-01-01 PROCEDURE — 81001 URINALYSIS AUTO W/SCOPE: CPT | Performed by: EMERGENCY MEDICINE

## 2021-01-01 PROCEDURE — 93010 ELECTROCARDIOGRAM REPORT: CPT | Performed by: UROLOGY

## 2021-01-01 PROCEDURE — B2111ZZ FLUOROSCOPY OF MULTIPLE CORONARY ARTERIES USING LOW OSMOLAR CONTRAST: ICD-10-PCS | Performed by: INTERNAL MEDICINE

## 2021-01-01 PROCEDURE — 99291 CRITICAL CARE FIRST HOUR: CPT | Performed by: INTERNAL MEDICINE

## 2021-01-01 PROCEDURE — 73620 X-RAY EXAM OF FOOT: CPT | Performed by: HOSPITALIST

## 2021-01-01 PROCEDURE — 99497 ADVNCD CARE PLAN 30 MIN: CPT | Performed by: HOSPITALIST

## 2021-01-01 PROCEDURE — 99153 MOD SED SAME PHYS/QHP EA: CPT

## 2021-01-01 PROCEDURE — 77387 GUIDANCE FOR RADJ TX DLVR: CPT | Performed by: INTERNAL MEDICINE

## 2021-01-01 PROCEDURE — 83036 HEMOGLOBIN GLYCOSYLATED A1C: CPT

## 2021-01-01 PROCEDURE — 0DB68ZZ EXCISION OF STOMACH, VIA NATURAL OR ARTIFICIAL OPENING ENDOSCOPIC: ICD-10-PCS | Performed by: INTERNAL MEDICINE

## 2021-01-01 PROCEDURE — 0HBRXZZ EXCISION OF TOE NAIL, EXTERNAL APPROACH: ICD-10-PCS | Performed by: PODIATRIST

## 2021-01-01 PROCEDURE — 74176 CT ABD & PELVIS W/O CONTRAST: CPT | Performed by: NURSE PRACTITIONER

## 2021-01-01 PROCEDURE — 93010 ELECTROCARDIOGRAM REPORT: CPT | Performed by: INTERNAL MEDICINE

## 2021-01-01 PROCEDURE — 30233N1 TRANSFUSION OF NONAUTOLOGOUS RED BLOOD CELLS INTO PERIPHERAL VEIN, PERCUTANEOUS APPROACH: ICD-10-PCS | Performed by: INTERNAL MEDICINE

## 2021-01-01 PROCEDURE — 73060 X-RAY EXAM OF HUMERUS: CPT | Performed by: INTERNAL MEDICINE

## 2021-01-01 PROCEDURE — 93017 CV STRESS TEST TRACING ONLY: CPT | Performed by: INTERNAL MEDICINE

## 2021-01-01 PROCEDURE — 99215 OFFICE O/P EST HI 40 MIN: CPT | Performed by: INTERNAL MEDICINE

## 2021-01-01 PROCEDURE — 82962 GLUCOSE BLOOD TEST: CPT

## 2021-01-01 PROCEDURE — 86850 RBC ANTIBODY SCREEN: CPT

## 2021-01-01 PROCEDURE — 70450 CT HEAD/BRAIN W/O DYE: CPT | Performed by: EMERGENCY MEDICINE

## 2021-01-01 PROCEDURE — 0DB68ZX EXCISION OF STOMACH, VIA NATURAL OR ARTIFICIAL OPENING ENDOSCOPIC, DIAGNOSTIC: ICD-10-PCS | Performed by: INTERNAL MEDICINE

## 2021-01-01 PROCEDURE — 99239 HOSP IP/OBS DSCHRG MGMT >30: CPT | Performed by: HOSPITALIST

## 2021-01-01 PROCEDURE — 93016 CV STRESS TEST SUPVJ ONLY: CPT | Performed by: INTERNAL MEDICINE

## 2021-01-01 PROCEDURE — 80061 LIPID PANEL: CPT

## 2021-01-01 PROCEDURE — 99214 OFFICE O/P EST MOD 30 MIN: CPT | Performed by: INTERNAL MEDICINE

## 2021-01-01 PROCEDURE — 99217 OBSERVATION CARE DISCHARGE: CPT | Performed by: INTERNAL MEDICINE

## 2021-01-01 PROCEDURE — 85610 PROTHROMBIN TIME: CPT

## 2021-01-01 PROCEDURE — 5A1935Z RESPIRATORY VENTILATION, LESS THAN 24 CONSECUTIVE HOURS: ICD-10-PCS | Performed by: INTERNAL MEDICINE

## 2021-01-01 PROCEDURE — 78452 HT MUSCLE IMAGE SPECT MULT: CPT | Performed by: INTERNAL MEDICINE

## 2021-01-01 PROCEDURE — 45380 COLONOSCOPY AND BIOPSY: CPT | Performed by: INTERNAL MEDICINE

## 2021-01-01 PROCEDURE — 84484 ASSAY OF TROPONIN QUANT: CPT | Performed by: EMERGENCY MEDICINE

## 2021-01-01 PROCEDURE — 80048 BASIC METABOLIC PNL TOTAL CA: CPT | Performed by: EMERGENCY MEDICINE

## 2021-01-01 PROCEDURE — 99213 OFFICE O/P EST LOW 20 MIN: CPT | Performed by: HOSPITALIST

## 2021-01-01 PROCEDURE — 1111F DSCHRG MED/CURRENT MED MERGE: CPT | Performed by: UROLOGY

## 2021-01-01 PROCEDURE — 85060 BLOOD SMEAR INTERPRETATION: CPT

## 2021-01-01 PROCEDURE — 36556 INSERT NON-TUNNEL CV CATH: CPT | Performed by: INTERNAL MEDICINE

## 2021-01-01 PROCEDURE — 99233 SBSQ HOSP IP/OBS HIGH 50: CPT | Performed by: INTERNAL MEDICINE

## 2021-01-01 PROCEDURE — 0BH17EZ INSERTION OF ENDOTRACHEAL AIRWAY INTO TRACHEA, VIA NATURAL OR ARTIFICIAL OPENING: ICD-10-PCS | Performed by: EMERGENCY MEDICINE

## 2021-01-01 PROCEDURE — 99222 1ST HOSP IP/OBS MODERATE 55: CPT | Performed by: INTERNAL MEDICINE

## 2021-01-01 PROCEDURE — 85347 COAGULATION TIME ACTIVATED: CPT

## 2021-01-01 PROCEDURE — 92943 PRQ TRLUML REVSC CH OCC ANT: CPT

## 2021-01-01 PROCEDURE — 0DBM8ZZ EXCISION OF DESCENDING COLON, VIA NATURAL OR ARTIFICIAL OPENING ENDOSCOPIC: ICD-10-PCS | Performed by: INTERNAL MEDICINE

## 2021-01-01 PROCEDURE — 71046 X-RAY EXAM CHEST 2 VIEWS: CPT | Performed by: UROLOGY

## 2021-01-01 PROCEDURE — 84466 ASSAY OF TRANSFERRIN: CPT

## 2021-01-01 PROCEDURE — 93459 L HRT ART/GRFT ANGIO: CPT

## 2021-01-01 PROCEDURE — 52000 CYSTOURETHROSCOPY: CPT | Performed by: UROLOGY

## 2021-01-01 PROCEDURE — 0002A SARSCOV2 VAC 30MCG/0.3ML IM: CPT

## 2021-01-01 RX ORDER — HYDROCODONE BITARTRATE AND ACETAMINOPHEN 5; 325 MG/1; MG/1
2 TABLET ORAL AS NEEDED
Status: DISCONTINUED | OUTPATIENT
Start: 2021-01-01 | End: 2021-01-01 | Stop reason: HOSPADM

## 2021-01-01 RX ORDER — OMEGA-3 FATTY ACIDS 300 MG
CAPSULE ORAL
COMMUNITY
End: 2021-01-01 | Stop reason: ALTCHOICE

## 2021-01-01 RX ORDER — MAGNESIUM OXIDE 400 MG (241.3 MG MAGNESIUM) TABLET
400 TABLET ONCE
Status: COMPLETED | OUTPATIENT
Start: 2021-01-01 | End: 2021-01-01

## 2021-01-01 RX ORDER — TAMSULOSIN HYDROCHLORIDE 0.4 MG/1
0.4 CAPSULE ORAL NIGHTLY
Status: DISCONTINUED | OUTPATIENT
Start: 2021-01-01 | End: 2021-01-01

## 2021-01-01 RX ORDER — BRIMONIDINE TARTRATE 2 MG/ML
1 SOLUTION/ DROPS OPHTHALMIC 3 TIMES DAILY
Status: DISCONTINUED | OUTPATIENT
Start: 2021-01-01 | End: 2021-01-01

## 2021-01-01 RX ORDER — VANCOMYCIN HYDROCHLORIDE
15 EVERY 12 HOURS
Status: DISCONTINUED | OUTPATIENT
Start: 2021-01-01 | End: 2021-01-01

## 2021-01-01 RX ORDER — DEXAMETHASONE 4 MG/1
8 TABLET ORAL 2 TIMES DAILY WITH MEALS
Qty: 24 TABLET | Refills: 4 | Status: SHIPPED | OUTPATIENT
Start: 2021-01-01 | End: 2021-01-01 | Stop reason: ALTCHOICE

## 2021-01-01 RX ORDER — CYCLOBENZAPRINE HCL 10 MG
10 TABLET ORAL 3 TIMES DAILY PRN
Status: DISCONTINUED | OUTPATIENT
Start: 2021-01-01 | End: 2021-01-01

## 2021-01-01 RX ORDER — MAGNESIUM OXIDE 400 MG (241.3 MG MAGNESIUM) TABLET
400 TABLET 2 TIMES DAILY WITH MEALS
Status: DISCONTINUED | OUTPATIENT
Start: 2021-01-01 | End: 2021-01-01

## 2021-01-01 RX ORDER — PANTOPRAZOLE SODIUM 40 MG/1
40 TABLET, DELAYED RELEASE ORAL
Status: DISCONTINUED | OUTPATIENT
Start: 2021-01-01 | End: 2021-01-01

## 2021-01-01 RX ORDER — ONDANSETRON 2 MG/ML
8 INJECTION INTRAMUSCULAR; INTRAVENOUS ONCE
Status: COMPLETED | OUTPATIENT
Start: 2021-01-01 | End: 2021-01-01

## 2021-01-01 RX ORDER — NALOXONE HYDROCHLORIDE 0.4 MG/ML
80 INJECTION, SOLUTION INTRAMUSCULAR; INTRAVENOUS; SUBCUTANEOUS AS NEEDED
Status: DISCONTINUED | OUTPATIENT
Start: 2021-01-01 | End: 2021-01-01 | Stop reason: HOSPADM

## 2021-01-01 RX ORDER — ACETAMINOPHEN 325 MG/1
650 TABLET ORAL EVERY 6 HOURS PRN
Status: DISCONTINUED | OUTPATIENT
Start: 2021-01-01 | End: 2021-01-01

## 2021-01-01 RX ORDER — MORPHINE SULFATE 4 MG/ML
4 INJECTION, SOLUTION INTRAMUSCULAR; INTRAVENOUS EVERY 10 MIN PRN
Status: DISCONTINUED | OUTPATIENT
Start: 2021-01-01 | End: 2021-01-01 | Stop reason: HOSPADM

## 2021-01-01 RX ORDER — POTASSIUM CHLORIDE 14.9 MG/ML
20 INJECTION INTRAVENOUS ONCE
Status: COMPLETED | OUTPATIENT
Start: 2021-01-01 | End: 2021-01-01

## 2021-01-01 RX ORDER — SODIUM PHOSPHATE, DIBASIC AND SODIUM PHOSPHATE, MONOBASIC 7; 19 G/133ML; G/133ML
1 ENEMA RECTAL ONCE AS NEEDED
Status: DISCONTINUED | OUTPATIENT
Start: 2021-01-01 | End: 2021-01-01

## 2021-01-01 RX ORDER — CLOPIDOGREL BISULFATE 75 MG/1
75 TABLET ORAL DAILY
Qty: 90 TABLET | Refills: 0 | Status: SHIPPED | OUTPATIENT
Start: 2021-01-01

## 2021-01-01 RX ORDER — ONDANSETRON 4 MG/1
4 TABLET, ORALLY DISINTEGRATING ORAL EVERY 4 HOURS PRN
Qty: 10 TABLET | Refills: 0 | Status: SHIPPED | OUTPATIENT
Start: 2021-01-01 | End: 2021-01-01

## 2021-01-01 RX ORDER — ASPIRIN 81 MG/1
81 TABLET ORAL DAILY
Status: DISCONTINUED | OUTPATIENT
Start: 2021-01-01 | End: 2021-01-01

## 2021-01-01 RX ORDER — MORPHINE SULFATE 4 MG/ML
2 INJECTION, SOLUTION INTRAMUSCULAR; INTRAVENOUS EVERY 10 MIN PRN
Status: DISCONTINUED | OUTPATIENT
Start: 2021-01-01 | End: 2021-01-01 | Stop reason: HOSPADM

## 2021-01-01 RX ORDER — CALCIUM CARBONATE/VITAMIN D3 250-3.125
2 TABLET ORAL
Status: DISCONTINUED | OUTPATIENT
Start: 2021-01-01 | End: 2021-01-01

## 2021-01-01 RX ORDER — LIDOCAINE HYDROCHLORIDE 20 MG/ML
INJECTION, SOLUTION EPIDURAL; INFILTRATION; INTRACAUDAL; PERINEURAL
Status: COMPLETED
Start: 2021-01-01 | End: 2021-01-01

## 2021-01-01 RX ORDER — ACETAMINOPHEN 650 MG/1
SUPPOSITORY RECTAL
Status: COMPLETED
Start: 2021-01-01 | End: 2021-01-01

## 2021-01-01 RX ORDER — ASPIRIN 81 MG/1
81 TABLET ORAL DAILY
Qty: 30 TABLET | Refills: 0 | Status: SHIPPED | OUTPATIENT
Start: 2021-01-01

## 2021-01-01 RX ORDER — MORPHINE SULFATE 4 MG/ML
INJECTION, SOLUTION INTRAMUSCULAR; INTRAVENOUS
Status: COMPLETED
Start: 2021-01-01 | End: 2021-01-01

## 2021-01-01 RX ORDER — SODIUM CHLORIDE 9 MG/ML
INJECTION, SOLUTION INTRAVENOUS CONTINUOUS
Status: ACTIVE | OUTPATIENT
Start: 2021-01-01 | End: 2021-01-01

## 2021-01-01 RX ORDER — SODIUM CHLORIDE, SODIUM LACTATE, POTASSIUM CHLORIDE, CALCIUM CHLORIDE 600; 310; 30; 20 MG/100ML; MG/100ML; MG/100ML; MG/100ML
INJECTION, SOLUTION INTRAVENOUS CONTINUOUS
Status: DISCONTINUED | OUTPATIENT
Start: 2021-01-01 | End: 2021-01-01

## 2021-01-01 RX ORDER — MAGNESIUM HYDROXIDE 1200 MG/15ML
3000 LIQUID ORAL CONTINUOUS
Status: DISCONTINUED | OUTPATIENT
Start: 2021-01-01 | End: 2021-01-01

## 2021-01-01 RX ORDER — METOPROLOL TARTRATE 100 MG/1
TABLET ORAL
Qty: 180 TABLET | Refills: 1 | Status: SHIPPED | OUTPATIENT
Start: 2021-01-01

## 2021-01-01 RX ORDER — HYDROCODONE BITARTRATE AND ACETAMINOPHEN 5; 325 MG/1; MG/1
1 TABLET ORAL EVERY 6 HOURS PRN
Qty: 30 TABLET | Refills: 0 | Status: SHIPPED | OUTPATIENT
Start: 2021-01-01 | End: 2021-01-01 | Stop reason: ALTCHOICE

## 2021-01-01 RX ORDER — ATORVASTATIN CALCIUM 40 MG/1
TABLET, FILM COATED ORAL
Qty: 90 TABLET | Refills: 1 | Status: SHIPPED | OUTPATIENT
Start: 2021-01-01

## 2021-01-01 RX ORDER — HYDROMORPHONE HYDROCHLORIDE 1 MG/ML
0.6 INJECTION, SOLUTION INTRAMUSCULAR; INTRAVENOUS; SUBCUTANEOUS EVERY 5 MIN PRN
Status: DISCONTINUED | OUTPATIENT
Start: 2021-01-01 | End: 2021-01-01 | Stop reason: HOSPADM

## 2021-01-01 RX ORDER — DEXTROSE MONOHYDRATE 25 G/50ML
50 INJECTION, SOLUTION INTRAVENOUS
Status: DISCONTINUED | OUTPATIENT
Start: 2021-01-01 | End: 2021-01-01

## 2021-01-01 RX ORDER — MAGNESIUM OXIDE 400 MG (241.3 MG MAGNESIUM) TABLET
800 TABLET ONCE
Status: COMPLETED | OUTPATIENT
Start: 2021-01-01 | End: 2021-01-01

## 2021-01-01 RX ORDER — SODIUM CHLORIDE 9 MG/ML
INJECTION, SOLUTION INTRAVENOUS CONTINUOUS
Status: DISCONTINUED | OUTPATIENT
Start: 2021-01-01 | End: 2021-01-01

## 2021-01-01 RX ORDER — ONDANSETRON 2 MG/ML
4 INJECTION INTRAMUSCULAR; INTRAVENOUS ONCE AS NEEDED
Status: DISCONTINUED | OUTPATIENT
Start: 2021-01-01 | End: 2021-01-01 | Stop reason: HOSPADM

## 2021-01-01 RX ORDER — ATORVASTATIN CALCIUM 40 MG/1
40 TABLET, FILM COATED ORAL DAILY
Status: DISCONTINUED | OUTPATIENT
Start: 2021-01-01 | End: 2021-01-01

## 2021-01-01 RX ORDER — CHLORHEXIDINE GLUCONATE 0.12 MG/ML
15 RINSE ORAL
Status: DISCONTINUED | OUTPATIENT
Start: 2021-01-01 | End: 2021-01-01

## 2021-01-01 RX ORDER — DEXAMETHASONE 4 MG/1
8 TABLET ORAL ONCE
Status: COMPLETED | OUTPATIENT
Start: 2021-01-01 | End: 2021-01-01

## 2021-01-01 RX ORDER — DEXAMETHASONE SODIUM PHOSPHATE 4 MG/ML
VIAL (ML) INJECTION AS NEEDED
Status: DISCONTINUED | OUTPATIENT
Start: 2021-01-01 | End: 2021-01-01 | Stop reason: SURG

## 2021-01-01 RX ORDER — AMLODIPINE BESYLATE 10 MG/1
10 TABLET ORAL DAILY
Qty: 90 TABLET | Refills: 3 | Status: ON HOLD | OUTPATIENT
Start: 2021-01-01 | End: 2021-01-01

## 2021-01-01 RX ORDER — ONDANSETRON 2 MG/ML
INJECTION INTRAMUSCULAR; INTRAVENOUS AS NEEDED
Status: DISCONTINUED | OUTPATIENT
Start: 2021-01-01 | End: 2021-01-01 | Stop reason: SURG

## 2021-01-01 RX ORDER — CIPROFLOXACIN 500 MG/1
500 TABLET, FILM COATED ORAL ONCE
Status: COMPLETED | OUTPATIENT
Start: 2021-01-01 | End: 2021-01-01

## 2021-01-01 RX ORDER — SENNA AND DOCUSATE SODIUM 50; 8.6 MG/1; MG/1
2 TABLET, FILM COATED ORAL DAILY
Status: DISCONTINUED | OUTPATIENT
Start: 2021-01-01 | End: 2021-01-01

## 2021-01-01 RX ORDER — SODIUM CHLORIDE 9 MG/ML
INJECTION, SOLUTION INTRAVENOUS ONCE
Status: DISCONTINUED | OUTPATIENT
Start: 2021-01-01 | End: 2021-01-01

## 2021-01-01 RX ORDER — MAGNESIUM OXIDE 400 MG (241.3 MG MAGNESIUM) TABLET
400 TABLET ONCE
Status: DISCONTINUED | OUTPATIENT
Start: 2021-01-01 | End: 2021-01-01

## 2021-01-01 RX ORDER — MORPHINE SULFATE 4 MG/ML
2 INJECTION, SOLUTION INTRAMUSCULAR; INTRAVENOUS ONCE
Status: COMPLETED | OUTPATIENT
Start: 2021-01-01 | End: 2021-01-01

## 2021-01-01 RX ORDER — AMLODIPINE BESYLATE 10 MG/1
5 TABLET ORAL DAILY
Qty: 90 TABLET | Refills: 3 | Status: ON HOLD | COMMUNITY
Start: 2021-01-01 | End: 2021-01-01

## 2021-01-01 RX ORDER — ACETAMINOPHEN 500 MG
1000 TABLET ORAL EVERY 6 HOURS PRN
Status: DISCONTINUED | OUTPATIENT
Start: 2021-01-01 | End: 2021-01-01

## 2021-01-01 RX ORDER — PROCHLORPERAZINE MALEATE 10 MG
10 TABLET ORAL EVERY 6 HOURS PRN
Qty: 30 TABLET | Refills: 2 | Status: ON HOLD | OUTPATIENT
Start: 2021-01-01 | End: 2021-01-01

## 2021-01-01 RX ORDER — AMLODIPINE BESYLATE 10 MG/1
10 TABLET ORAL DAILY
Status: DISCONTINUED | OUTPATIENT
Start: 2021-01-01 | End: 2021-01-01

## 2021-01-01 RX ORDER — LIDOCAINE HYDROCHLORIDE 10 MG/ML
INJECTION, SOLUTION EPIDURAL; INFILTRATION; INTRACAUDAL; PERINEURAL AS NEEDED
Status: DISCONTINUED | OUTPATIENT
Start: 2021-01-01 | End: 2021-01-01 | Stop reason: SURG

## 2021-01-01 RX ORDER — CLOPIDOGREL BISULFATE 75 MG/1
75 TABLET ORAL DAILY
Qty: 90 TABLET | Refills: 3 | Status: ON HOLD | OUTPATIENT
Start: 2021-01-01 | End: 2021-01-01

## 2021-01-01 RX ORDER — ACETAMINOPHEN 500 MG
1000 TABLET ORAL EVERY 6 HOURS PRN
Status: ON HOLD | COMMUNITY
End: 2021-01-01

## 2021-01-01 RX ORDER — SODIUM CHLORIDE 9 MG/ML
INJECTION, SOLUTION INTRAVENOUS ONCE
Status: COMPLETED | OUTPATIENT
Start: 2021-01-01 | End: 2021-01-01

## 2021-01-01 RX ORDER — SODIUM CHLORIDE, SODIUM LACTATE, POTASSIUM CHLORIDE, CALCIUM CHLORIDE 600; 310; 30; 20 MG/100ML; MG/100ML; MG/100ML; MG/100ML
INJECTION, SOLUTION INTRAVENOUS CONTINUOUS PRN
Status: DISCONTINUED | OUTPATIENT
Start: 2021-01-01 | End: 2021-01-01 | Stop reason: SURG

## 2021-01-01 RX ORDER — FUROSEMIDE 10 MG/ML
20 INJECTION INTRAMUSCULAR; INTRAVENOUS
Status: DISCONTINUED | OUTPATIENT
Start: 2021-01-01 | End: 2021-01-01

## 2021-01-01 RX ORDER — TIMOLOL MALEATE 5 MG/ML
1 SOLUTION/ DROPS OPHTHALMIC 2 TIMES DAILY
Status: DISCONTINUED | OUTPATIENT
Start: 2021-01-01 | End: 2021-01-01

## 2021-01-01 RX ORDER — POLYETHYLENE GLYCOL 3350 17 G/17G
17 POWDER, FOR SOLUTION ORAL DAILY PRN
Status: DISCONTINUED | OUTPATIENT
Start: 2021-01-01 | End: 2021-01-01

## 2021-01-01 RX ORDER — POTASSIUM CHLORIDE 20 MEQ/1
40 TABLET, EXTENDED RELEASE ORAL EVERY 4 HOURS
Status: COMPLETED | OUTPATIENT
Start: 2021-01-01 | End: 2021-01-01

## 2021-01-01 RX ORDER — AMLODIPINE BESYLATE 5 MG/1
5 TABLET ORAL DAILY
Status: DISCONTINUED | OUTPATIENT
Start: 2021-01-01 | End: 2021-01-01

## 2021-01-01 RX ORDER — ROCURONIUM BROMIDE 10 MG/ML
INJECTION, SOLUTION INTRAVENOUS AS NEEDED
Status: DISCONTINUED | OUTPATIENT
Start: 2021-01-01 | End: 2021-01-01 | Stop reason: SURG

## 2021-01-01 RX ORDER — DEXAMETHASONE 4 MG/1
8 TABLET ORAL 2 TIMES DAILY WITH MEALS
Status: COMPLETED | OUTPATIENT
Start: 2021-01-01 | End: 2021-01-01

## 2021-01-01 RX ORDER — METOPROLOL TARTRATE 5 MG/5ML
2.5 INJECTION INTRAVENOUS ONCE
Status: DISCONTINUED | OUTPATIENT
Start: 2021-01-01 | End: 2021-01-01 | Stop reason: HOSPADM

## 2021-01-01 RX ORDER — VANCOMYCIN 2 GRAM/500 ML IN 0.9 % SODIUM CHLORIDE INTRAVENOUS
25 ONCE
Status: COMPLETED | OUTPATIENT
Start: 2021-01-01 | End: 2021-01-01

## 2021-01-01 RX ORDER — MIDAZOLAM HYDROCHLORIDE 1 MG/ML
INJECTION INTRAMUSCULAR; INTRAVENOUS
Status: COMPLETED
Start: 2021-01-01 | End: 2021-01-01

## 2021-01-01 RX ORDER — METOPROLOL TARTRATE 100 MG/1
100 TABLET ORAL 2 TIMES DAILY
Status: DISCONTINUED | OUTPATIENT
Start: 2021-01-01 | End: 2021-01-01

## 2021-01-01 RX ORDER — HALOPERIDOL 5 MG/ML
0.25 INJECTION INTRAMUSCULAR ONCE AS NEEDED
Status: DISCONTINUED | OUTPATIENT
Start: 2021-01-01 | End: 2021-01-01 | Stop reason: HOSPADM

## 2021-01-01 RX ORDER — DORZOLAMIDE HCL 20 MG/ML
1 SOLUTION/ DROPS OPHTHALMIC 2 TIMES DAILY
Status: DISCONTINUED | OUTPATIENT
Start: 2021-01-01 | End: 2021-01-01

## 2021-01-01 RX ORDER — HEPARIN SODIUM 5000 [USP'U]/ML
5000 INJECTION, SOLUTION INTRAVENOUS; SUBCUTANEOUS EVERY 12 HOURS SCHEDULED
Status: DISCONTINUED | OUTPATIENT
Start: 2021-01-01 | End: 2021-01-01

## 2021-01-01 RX ORDER — HEPARIN SODIUM 1000 [USP'U]/ML
INJECTION, SOLUTION INTRAVENOUS; SUBCUTANEOUS
Status: COMPLETED
Start: 2021-01-01 | End: 2021-01-01

## 2021-01-01 RX ORDER — AMLODIPINE BESYLATE 5 MG/1
5 TABLET ORAL DAILY
Qty: 90 TABLET | Refills: 3 | Status: CANCELLED | OUTPATIENT
Start: 2021-01-01

## 2021-01-01 RX ORDER — LIDOCAINE HYDROCHLORIDE 20 MG/ML
JELLY TOPICAL AS NEEDED
Status: DISCONTINUED | OUTPATIENT
Start: 2021-01-01 | End: 2021-01-01 | Stop reason: HOSPADM

## 2021-01-01 RX ORDER — METOCLOPRAMIDE HYDROCHLORIDE 5 MG/ML
10 INJECTION INTRAMUSCULAR; INTRAVENOUS EVERY 8 HOURS PRN
Status: DISCONTINUED | OUTPATIENT
Start: 2021-01-01 | End: 2021-01-01

## 2021-01-01 RX ORDER — CALCIUM CARBONATE/VITAMIN D3 250-3.125
2 TABLET ORAL
Qty: 60 TABLET | Refills: 0 | Status: SHIPPED | OUTPATIENT
Start: 2021-01-01

## 2021-01-01 RX ORDER — CYCLOBENZAPRINE HCL 10 MG
10 TABLET ORAL 3 TIMES DAILY PRN
Qty: 30 TABLET | Refills: 1 | Status: SHIPPED | OUTPATIENT
Start: 2021-01-01 | End: 2021-01-01

## 2021-01-01 RX ORDER — FAMOTIDINE 20 MG/1
20 TABLET ORAL 2 TIMES DAILY
Status: DISCONTINUED | OUTPATIENT
Start: 2021-01-01 | End: 2021-01-01

## 2021-01-01 RX ORDER — CLOPIDOGREL BISULFATE 75 MG/1
75 TABLET ORAL DAILY
Status: DISCONTINUED | OUTPATIENT
Start: 2021-01-01 | End: 2021-01-01

## 2021-01-01 RX ORDER — TRAMADOL HYDROCHLORIDE 50 MG/1
50 TABLET ORAL EVERY 6 HOURS PRN
Status: DISCONTINUED | OUTPATIENT
Start: 2021-01-01 | End: 2021-01-01

## 2021-01-01 RX ORDER — SODIUM CHLORIDE, SODIUM LACTATE, POTASSIUM CHLORIDE, CALCIUM CHLORIDE 600; 310; 30; 20 MG/100ML; MG/100ML; MG/100ML; MG/100ML
INJECTION, SOLUTION INTRAVENOUS CONTINUOUS
Status: DISCONTINUED | OUTPATIENT
Start: 2021-01-01 | End: 2021-01-01 | Stop reason: HOSPADM

## 2021-01-01 RX ORDER — SODIUM CHLORIDE 0.9 % (FLUSH) 0.9 %
10 SYRINGE (ML) INJECTION AS NEEDED
Status: DISCONTINUED | OUTPATIENT
Start: 2021-01-01 | End: 2021-01-01

## 2021-01-01 RX ORDER — PROCHLORPERAZINE EDISYLATE 5 MG/ML
5 INJECTION INTRAMUSCULAR; INTRAVENOUS ONCE AS NEEDED
Status: DISCONTINUED | OUTPATIENT
Start: 2021-01-01 | End: 2021-01-01 | Stop reason: HOSPADM

## 2021-01-01 RX ORDER — ONDANSETRON 4 MG/1
4 TABLET, ORALLY DISINTEGRATING ORAL EVERY 6 HOURS PRN
Status: DISCONTINUED | OUTPATIENT
Start: 2021-01-01 | End: 2021-01-01

## 2021-01-01 RX ORDER — ONDANSETRON 2 MG/ML
4 INJECTION INTRAMUSCULAR; INTRAVENOUS EVERY 6 HOURS PRN
Status: DISCONTINUED | OUTPATIENT
Start: 2021-01-01 | End: 2021-01-01

## 2021-01-01 RX ORDER — MORPHINE SULFATE 4 MG/ML
4 INJECTION, SOLUTION INTRAMUSCULAR; INTRAVENOUS ONCE
Status: COMPLETED | OUTPATIENT
Start: 2021-01-01 | End: 2021-01-01

## 2021-01-01 RX ORDER — ONDANSETRON HYDROCHLORIDE 8 MG/1
8 TABLET, FILM COATED ORAL EVERY 8 HOURS PRN
Qty: 30 TABLET | Refills: 2 | Status: ON HOLD | OUTPATIENT
Start: 2021-01-01 | End: 2021-01-01

## 2021-01-01 RX ORDER — FAMOTIDINE 20 MG/1
20 TABLET ORAL DAILY
Status: DISCONTINUED | OUTPATIENT
Start: 2021-01-01 | End: 2021-01-01

## 2021-01-01 RX ORDER — ONDANSETRON 2 MG/ML
INJECTION INTRAMUSCULAR; INTRAVENOUS
Status: COMPLETED
Start: 2021-01-01 | End: 2021-01-01

## 2021-01-01 RX ORDER — CYCLOBENZAPRINE HCL 10 MG
10 TABLET ORAL 3 TIMES DAILY PRN
COMMUNITY

## 2021-01-01 RX ORDER — ACETAMINOPHEN 500 MG
TABLET ORAL
Status: COMPLETED
Start: 2021-01-01 | End: 2021-01-01

## 2021-01-01 RX ORDER — CALCIUM CARBONATE/VITAMIN D3 250-3.125
2 TABLET ORAL
Status: CANCELLED | OUTPATIENT
Start: 2021-01-01

## 2021-01-01 RX ORDER — CEFADROXIL 500 MG/1
CAPSULE ORAL
Qty: 10 CAPSULE | Refills: 0 | Status: SHIPPED | OUTPATIENT
Start: 2021-01-01 | End: 2021-01-01

## 2021-01-01 RX ORDER — ACETAMINOPHEN 500 MG
1000 TABLET ORAL ONCE
Status: COMPLETED | OUTPATIENT
Start: 2021-01-01 | End: 2021-01-01

## 2021-01-01 RX ORDER — ONDANSETRON 4 MG/1
4 TABLET, ORALLY DISINTEGRATING ORAL EVERY 8 HOURS PRN
Status: ON HOLD | COMMUNITY
End: 2021-01-01

## 2021-01-01 RX ORDER — NITROGLYCERIN 0.4 MG/1
0.4 TABLET SUBLINGUAL EVERY 5 MIN PRN
Status: DISCONTINUED | OUTPATIENT
Start: 2021-01-01 | End: 2021-01-01

## 2021-01-01 RX ORDER — PANTOPRAZOLE SODIUM 40 MG/1
40 TABLET, DELAYED RELEASE ORAL
Qty: 60 TABLET | Refills: 1 | Status: SHIPPED | OUTPATIENT
Start: 2021-01-01

## 2021-01-01 RX ORDER — CALCIUM GLUCONATE 94 MG/ML
1 INJECTION, SOLUTION INTRAVENOUS 2 TIMES DAILY
Status: CANCELLED | OUTPATIENT
Start: 2021-01-01

## 2021-01-01 RX ORDER — DEXMEDETOMIDINE HYDROCHLORIDE 4 UG/ML
INJECTION, SOLUTION INTRAVENOUS CONTINUOUS
Status: DISCONTINUED | OUTPATIENT
Start: 2021-01-01 | End: 2021-01-01

## 2021-01-01 RX ORDER — ATROPA BELLADONNA AND OPIUM 16.2; 6 MG/1; MG/1
1 SUPPOSITORY RECTAL EVERY 6 HOURS PRN
Status: DISCONTINUED | OUTPATIENT
Start: 2021-01-01 | End: 2021-01-01

## 2021-01-01 RX ORDER — ACETAMINOPHEN 650 MG/1
1300 SUPPOSITORY RECTAL ONCE
Status: COMPLETED | OUTPATIENT
Start: 2021-01-01 | End: 2021-01-01

## 2021-01-01 RX ORDER — FAMOTIDINE 20 MG/1
20 TABLET ORAL 2 TIMES DAILY PRN
Qty: 60 TABLET | Refills: 0 | Status: ON HOLD | OUTPATIENT
Start: 2021-01-01 | End: 2021-01-01

## 2021-01-01 RX ORDER — ACETAMINOPHEN 325 MG/1
650 TABLET ORAL EVERY 4 HOURS PRN
Refills: 0 | Status: SHIPPED | COMMUNITY
Start: 2021-01-01

## 2021-01-01 RX ORDER — METOPROLOL TARTRATE 50 MG/1
100 TABLET, FILM COATED ORAL 2 TIMES DAILY
Status: DISCONTINUED | OUTPATIENT
Start: 2021-01-01 | End: 2021-01-01

## 2021-01-01 RX ORDER — ACETAMINOPHEN 325 MG/1
650 TABLET ORAL EVERY 4 HOURS PRN
Status: DISCONTINUED | OUTPATIENT
Start: 2021-01-01 | End: 2021-01-01

## 2021-01-01 RX ORDER — HYDROMORPHONE HYDROCHLORIDE 1 MG/ML
0.2 INJECTION, SOLUTION INTRAMUSCULAR; INTRAVENOUS; SUBCUTANEOUS EVERY 5 MIN PRN
Status: DISCONTINUED | OUTPATIENT
Start: 2021-01-01 | End: 2021-01-01 | Stop reason: HOSPADM

## 2021-01-01 RX ORDER — FUROSEMIDE 20 MG/1
20 TABLET ORAL
Status: DISCONTINUED | OUTPATIENT
Start: 2021-01-01 | End: 2021-01-01

## 2021-01-01 RX ORDER — GLYCOPYRROLATE 0.2 MG/ML
INJECTION, SOLUTION INTRAMUSCULAR; INTRAVENOUS AS NEEDED
Status: DISCONTINUED | OUTPATIENT
Start: 2021-01-01 | End: 2021-01-01 | Stop reason: SURG

## 2021-01-01 RX ORDER — SODIUM CHLORIDE 9 MG/ML
INJECTION, SOLUTION INTRAVENOUS
Status: COMPLETED | OUTPATIENT
Start: 2021-01-01 | End: 2021-01-01

## 2021-01-01 RX ORDER — DEXTROSE MONOHYDRATE 25 G/50ML
50 INJECTION, SOLUTION INTRAVENOUS
Status: DISCONTINUED | OUTPATIENT
Start: 2021-01-01 | End: 2021-01-01 | Stop reason: HOSPADM

## 2021-01-01 RX ORDER — VANCOMYCIN HYDROCHLORIDE
15 EVERY 24 HOURS
Status: DISCONTINUED | OUTPATIENT
Start: 2021-01-01 | End: 2021-01-01

## 2021-01-01 RX ORDER — NITROGLYCERIN 20 MG/100ML
INJECTION INTRAVENOUS
Status: COMPLETED
Start: 2021-01-01 | End: 2021-01-01

## 2021-01-01 RX ORDER — ONDANSETRON 2 MG/ML
4 INJECTION INTRAMUSCULAR; INTRAVENOUS ONCE
Status: COMPLETED | OUTPATIENT
Start: 2021-01-01 | End: 2021-01-01

## 2021-01-01 RX ORDER — FUROSEMIDE 20 MG/1
20 TABLET ORAL
Qty: 60 TABLET | Refills: 0 | Status: SHIPPED | OUTPATIENT
Start: 2021-01-01

## 2021-01-01 RX ORDER — CLOPIDOGREL BISULFATE 75 MG/1
TABLET ORAL
Status: COMPLETED
Start: 2021-01-01 | End: 2021-01-01

## 2021-01-01 RX ORDER — BLOOD SUGAR DIAGNOSTIC
STRIP MISCELLANEOUS
Qty: 300 STRIP | Refills: 0 | Status: ON HOLD | OUTPATIENT
Start: 2021-01-01 | End: 2021-01-01

## 2021-01-01 RX ORDER — ONDANSETRON 4 MG/1
4 TABLET, ORALLY DISINTEGRATING ORAL EVERY 8 HOURS PRN
Status: DISCONTINUED | OUTPATIENT
Start: 2021-01-01 | End: 2021-01-01

## 2021-01-01 RX ORDER — HYDROMORPHONE HYDROCHLORIDE 1 MG/ML
0.4 INJECTION, SOLUTION INTRAMUSCULAR; INTRAVENOUS; SUBCUTANEOUS EVERY 5 MIN PRN
Status: DISCONTINUED | OUTPATIENT
Start: 2021-01-01 | End: 2021-01-01 | Stop reason: HOSPADM

## 2021-01-01 RX ORDER — MORPHINE SULFATE 10 MG/ML
6 INJECTION, SOLUTION INTRAMUSCULAR; INTRAVENOUS EVERY 10 MIN PRN
Status: DISCONTINUED | OUTPATIENT
Start: 2021-01-01 | End: 2021-01-01 | Stop reason: HOSPADM

## 2021-01-01 RX ORDER — SODIUM CHLORIDE 9 MG/ML
INJECTION, SOLUTION INTRAVENOUS
Status: DISCONTINUED | OUTPATIENT
Start: 2021-01-01 | End: 2021-01-01

## 2021-01-01 RX ORDER — BLOOD SUGAR DIAGNOSTIC
STRIP MISCELLANEOUS
Qty: 300 STRIP | Refills: 0 | Status: SHIPPED | OUTPATIENT
Start: 2021-01-01 | End: 2021-01-01

## 2021-01-01 RX ORDER — BISACODYL 10 MG
10 SUPPOSITORY, RECTAL RECTAL
Status: DISCONTINUED | OUTPATIENT
Start: 2021-01-01 | End: 2021-01-01

## 2021-01-01 RX ORDER — HYDROCODONE BITARTRATE AND ACETAMINOPHEN 5; 325 MG/1; MG/1
1 TABLET ORAL AS NEEDED
Status: DISCONTINUED | OUTPATIENT
Start: 2021-01-01 | End: 2021-01-01 | Stop reason: HOSPADM

## 2021-01-01 RX ADMIN — ONDANSETRON 4 MG: 2 INJECTION INTRAMUSCULAR; INTRAVENOUS at 11:24:00

## 2021-01-01 RX ADMIN — SODIUM CHLORIDE: 9 INJECTION, SOLUTION INTRAVENOUS at 11:30:00

## 2021-01-01 RX ADMIN — SODIUM CHLORIDE, SODIUM LACTATE, POTASSIUM CHLORIDE, CALCIUM CHLORIDE: 600; 310; 30; 20 INJECTION, SOLUTION INTRAVENOUS at 13:50:00

## 2021-01-01 RX ADMIN — DEXAMETHASONE SODIUM PHOSPHATE 4 MG: 4 MG/ML VIAL (ML) INJECTION at 11:24:00

## 2021-01-01 RX ADMIN — ROCURONIUM BROMIDE 50 MG: 10 INJECTION, SOLUTION INTRAVENOUS at 11:36:00

## 2021-01-01 RX ADMIN — SODIUM CHLORIDE, SODIUM LACTATE, POTASSIUM CHLORIDE, CALCIUM CHLORIDE: 600; 310; 30; 20 INJECTION, SOLUTION INTRAVENOUS at 14:57:00

## 2021-01-01 RX ADMIN — ONDANSETRON 4 MG: 2 INJECTION INTRAMUSCULAR; INTRAVENOUS at 13:59:00

## 2021-01-01 RX ADMIN — LIDOCAINE HYDROCHLORIDE 50 MG: 10 INJECTION, SOLUTION EPIDURAL; INFILTRATION; INTRACAUDAL; PERINEURAL at 11:10:00

## 2021-01-01 RX ADMIN — ACETAMINOPHEN 650 MG: 500 MG TABLET ORAL at 14:00:00

## 2021-01-01 RX ADMIN — ACETAMINOPHEN 1000 MG: 500 MG TABLET ORAL at 13:25:00

## 2021-01-01 RX ADMIN — LIDOCAINE HYDROCHLORIDE 50 MG: 10 INJECTION, SOLUTION EPIDURAL; INFILTRATION; INTRACAUDAL; PERINEURAL at 13:57:00

## 2021-01-01 RX ADMIN — ACETAMINOPHEN 650 MG: 325 TABLET ORAL at 14:00:00

## 2021-01-01 RX ADMIN — GLYCOPYRROLATE 0.1 MG: 0.2 INJECTION, SOLUTION INTRAMUSCULAR; INTRAVENOUS at 13:57:00

## 2021-01-01 RX ADMIN — CIPROFLOXACIN 500 MG: 500 TABLET, FILM COATED ORAL at 14:05:00

## 2021-01-01 RX ADMIN — SODIUM CHLORIDE: 9 INJECTION, SOLUTION INTRAVENOUS at 10:50:00

## 2021-01-14 NOTE — TELEPHONE ENCOUNTER
LMTCB with how he is feeling and if we are to fax letter or will he ? Fax number? Per  OV 11/24/2020:  He also would like to go back to work and as long as the pain is proving he may return with no restrictions.

## 2021-01-14 NOTE — TELEPHONE ENCOUNTER
Patient is returning the call.  He is requesting for the document to be sent Cortez Curry at [facility] [employer] [facility] e-mail [email or web address]. Please follow up

## 2021-01-14 NOTE — TELEPHONE ENCOUNTER
Patient needs a note that states he is okay to go back to work. Patient called in to request a note to return to work in writing to his supervisor.  Please follow up

## 2021-01-15 NOTE — TELEPHONE ENCOUNTER
Confirmed patient feels well. Letter generated and sent per MY CHART. Will also mail to patient.      Per  OV 11/24/2020:  He also would like to go back to work and as long as the pain is proving he may return with no restrictions.

## 2021-01-15 NOTE — TELEPHONE ENCOUNTER
LMTCB with update of how he is feeling, and I can't email return to work letter. He can email it from his my chart message or  or we can fax.

## 2021-01-15 NOTE — TELEPHONE ENCOUNTER
From: Pankaj Alaniz  To: YONIS Barragan  Sent: 1/15/2021 12:15 PM CST  Subject: Other    note to Malena Nuñez: thank you for the follow up phone calls. I may be going back to work soon at [employer] facility.  I feel like I am ready to res

## 2021-01-25 NOTE — TELEPHONE ENCOUNTER
----- Message from [address] [place] [address]. Marisol Simon sent at 1/25/2021  3:07 PM CST -----  Regarding: Non-Urgent Medical Question  Contact: [phone]  DR Tristan, I am experiencing frequent urination , especially at night so that I am waking up every hour.  I  have h

## 2021-01-25 NOTE — TELEPHONE ENCOUNTER
[facility]  To: Liane Umanzor MD  Sent: 1/25/2021 3:07 PM CST  Subject: Non-Urgent Medical Question    DR Tristan, I am experiencing frequent urination , especially at night so that I am waking up every hour.  I have had this problem for a

## 2021-01-25 NOTE — TELEPHONE ENCOUNTER
Patient contacted by phone. Patient c/o urinary frequency, urge incontinence; states these symptoms are not new, just getting worse for past couple weeks.   States he does not feel like he empties his bladder all the time, states urine stream is sometimes s

## 2021-01-26 NOTE — TELEPHONE ENCOUNTER
OK to make follow up with me. During visit will have patient provide urine sample and check his post void residual.  If found to have emptying bladder ok during visit we may consider a trial of antimuscarinics.

## 2021-01-28 NOTE — PATIENT INSTRUCTIONS
Doing well  Continue same medicines  Work on exercise diet  And weight loss    If sugars remain elevated talk to primary    Increase amlodipine to 10 mg daily and monitor blood pressure for 2 weeks and call with results.   Always rest 5 minutes before check

## 2021-01-28 NOTE — PROGRESS NOTES
[place] [facility]  PROGRESS NOTE    Domi Cuellar is a 65year old male. Patient presents with:   Follow - Up  CAD    HPI:   This is a pleasant 65year old male with coronary disease status post bypass in September with prior coronary stents hypertension Cholecalciferol, 1000 units Oral Cap Take by mouth. • aspirin 81 MG Oral Tab Take 81 mg by mouth daily.        • methylPREDNISolone 4 MG Oral Tablet Therapy Pack TAKE AS DIRECTED (Patient not taking: Reported on 1/28/2021 ) 21 tablet 0   • Lancets (ONET heartburn  NEURO: denies headaches  Remainder of review of systems is completed and negative    EXAM:   BP (!) 171/83 (BP Location: Right arm, Patient Position: Sitting, Cuff Size: large)   Pulse 61   Ht 5' 9\" (1.753 m)   Wt 215 lb (97.5 kg)   BMI 31.75 k

## 2021-01-29 PROBLEM — R93.89 ABNORMAL FINDING ON IMAGING: Status: ACTIVE | Noted: 2021-01-01

## 2021-01-29 PROBLEM — Z79.899 MEDICATION MANAGEMENT: Status: ACTIVE | Noted: 2021-01-01

## 2021-01-29 NOTE — H&P
[facility] - Gastroenterology                                                                                                  Clinic History and Physical hypertension 1993   • Heart disease    • Hyperlipidemia 1993   • Prostate cancer [facility]) May 2015    Treated with hormonal injection Trelstar x 1 in May 2015.  Then external beam radiation in Sept 2015; and brachytherapy in Dec 2015   • Rheumatoid arthritis ( metoprolol Tartrate 100 MG Oral Tab Take 1 tablet (100 mg total) by mouth 2x Daily(Beta Blocker).  180 tablet 1   • ATORVASTATIN 40 MG Oral Tab TAKE ONE TABLET BY MOUTH ONE TIME DAILY  90 tablet 1   • famoTIDine 20 MG Oral Tab Take 1 tablet (20 mg total) by ALLERGIC/IMMUNOLOGIC:  negative for hay fever  ENDOCRINE:  negative for cold intolerance and heat intolerance  MUSCULOSKELETAL:  negative for joint effusion/severe erythema  BEHAVIOR/PSYCH:  negative for psychotic behavior      PHYSICAL EXAM:   Blood pre vaccine first. He understands the risks and benefits of waiting to do colonoscopy. I will contact his cardiologist Dr. Flakito Villegas to see if he is able to hold his Plavix safely for the colonoscopy.   Additionally the patient will see urology this week or n

## 2021-02-03 NOTE — PROGRESS NOTES
[facility]  Follow-Up Visit    HPI: Federico Cason is a 65year old male presents for a follow up visit. Patient was last seen by Dr. Javi Lockett in October 2020.      INTERVAL HISTORY: Here for an evaluation regarding urinary frequency and se greater than left. Patient not interested in salvage therapy or initiation of ADT. Elected observation with repeat PSA in 3 months. - 6/2020 F/U: PSA almost double to 12.4 ng/mL. Elected to start ADT; preferably intermittent ADT.  Started on bicalutamide CAD.    Reviewed past medical, surgical, family, and social history. Reviewed med list and allergies. REVIEW OF SYSTEMS:  Pertinent positives and negatives per HPI. A five-point review of systems was performed and is otherwise negative.       EXAM: hepatomegaly. 4. Small splenic and left renal cysts. 5. Coronary and peripheral atherosclerosis. NM Bone Scan (2/11/2020):  1. No evidence of bone metastases. Axumin PET-CT (03/2020):  1. Abnormal F 18 Fluciclovine PET-CT study.   2. There is inte answered.       PLAN:    1. Continue Tamsulosin for management of LUTS. M    2.  Decrease water intake to 60 oz daily, limit caffeine. 3. Follow up with Dr. Liseth Gaona in April as planned.           20 mintues were spent with more than half the time in fac

## 2021-02-17 NOTE — TELEPHONE ENCOUNTER
Yennifer Raya, patient was seen in office 1/28/21 w/MB BP in office was 171/83  Please advise    From: Hamilton Allen  To:  Chidi Logan MD  Sent: 2/17/2021  4:55 PM CST  Subject: Visit Follow-up Question    Dr Aida Nguyen, per your request I am sending my bl

## 2021-02-18 NOTE — TELEPHONE ENCOUNTER
BP are borderline, try to exercise more limit salt and record another 2 weeks of readings.  If still high can add ACE/ARB

## 2021-03-05 NOTE — TELEPHONE ENCOUNTER
From: Veatrice Romberg  To: Madison Valencia MD  Sent: 3/5/2021 8:44 AM CST  Subject: Other    Dr. Peri Farrell, per your request, the following is the last fourteen days of my bloodpressure readings.  Numbers have improved since I have increased exercise to

## 2021-03-05 NOTE — TELEPHONE ENCOUNTER
Marsh Shone sent BP's below, please advise    Per  01/2/21 OV   Patient will continue working on exercise diet and weight loss. Will increase amlodipine to 10 mg a day and monitor pressure for a week and call with results.   Depending on results w

## 2021-04-07 NOTE — PROGRESS NOTES
[facility] Urology  Follow-Up Visit    HPI: Piter Hudson is a 65year old male presents for a follow up visit. Patient was last seen on 2/3/2021 by JOSE RAMON Vo.     INTERVAL HISTORY:   Here for an [facility] injection for continuation of ADT PSA has been checked annually and has been rising.   Patient met criteria for biochemical recurrence in 10/2019 with a PSA of 6.77 ng/mL, confirmed with a repeat PSA level of 6.97 ng/mL in 01/2020.       - 2/2020 F/U: Metastatic evaluation including nuclear cystourethroscopy: No evidence of recurrence of urethral stricture. PVR 31 mL.     - 6/2020 F/U: PVR 6 mL's. Nocturia x 6. U/A 6/9/20 negative. Continue Tamsulosin. - 2/2021 F/U:  ml. Nocturia x 5. Continue tamsulosin.   Decrease water intake t 12.40 (H)   1/18/2020 6.97 (H)   10/12/2019 6.77 (H)   6/5/2018 1.3   5/10/2017 0.1   1/26/2016 0.1   2/25/2015 16.4 (H)   10/12/2012 4.0   4/4/2011 2.0     Component TESTOSTERONE   Latest Ref Rng & Units 86.98 - 780.10 ng/dL   4/1/2021 7.98 (L)   10/6/202 salvage local therapy.     Androgen deprivation therapy in the form of a [facility] agonist which was initiated in 07/2020. 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   Discussed with tammi

## 2021-04-07 NOTE — PROGRESS NOTES
Pt identity verified with name & . Administered 6-month Eligard 45 mg. sub-cutaneously to left mid-abdomen. Site dressed with 2 davei-cross bandaids. Procedure tolerated well as evidenced by no c/o pain/ discomfort. Encounter complete.

## 2021-04-09 NOTE — TELEPHONE ENCOUNTER
Patient is requesting a order for his A1C labs, he received the [facility] email as a reminder.

## 2021-04-13 NOTE — TELEPHONE ENCOUNTER
[employer] message viewed by patient.      From   Barbara Mendoza To   Sushila Arevalo Sent and Delivered   4/12/2021 10:38 AM   Last Read in [employer]   4/12/2021 10:42 AM by Piter Hudson

## 2021-05-06 NOTE — H&P (VIEW-ONLY)
[facility], [facility] Urology  Follow-Up Visit    HPI: Rodrigo Reid is a 65year old male presents for a follow up visit. Patient was last seen on 4/7/2021.     INTERVAL HISTORY: Here for office cystoscopy to further evaluate recently worsening LUTS and inc significantly greater than left. Patient not interested in salvage therapy or initiation of ADT. Elected observation with repeat PSA in 3 months. - 6/2020 F/U: PSA almost double to 12.4 ng/mL. Elected to start ADT; preferably intermittent ADT.  Started o intravesical median lobe.   Bladder outlet obstruction appreciated with potential prostate cancer nodules growing into the bladder neck.       PMH: HTN, HLD, DM, CAD S/P PCI in 2004 and CABG in 2020, gout, prostate cancer, history of urethral stricture.    Component TESTOSTERONE   Latest Ref Rng & Units 86.98 - 780.10 ng/dL   4/1/2021 7.98 (L)   10/6/2020 13.28 (L)       IMAGING:    CT Abdomen & Pelvis with IV (2/4/2020):  1. There is a history of prostate carcinoma with recent biochemical recurrence.   B trabeculated with cellules. U.O's: Normal    Trabeculation: +2-3    POST CYSTOSCOPY MEDICATIONS: sample one tablet Cipro 500 mg given to patient    DIAGNOSIS: Bladder neck obstruction possibly from locally advanced recurrent prostate cancer.       Katerin Aceves discussed risks including not limited to anesthetic complications, bleeding, infection, injury to surrounding organs or structures, bladder neck contracture, increased risk of post operative urinary incontinence given previous history of radiation to the p

## 2021-05-06 NOTE — H&P
[facility] Urology  Follow-Up Visit    HPI: Dalila Vance is a 65year old male presents for a follow up visit. Patient was last seen on 4/7/2021.     INTERVAL HISTORY: Here for office cystoscopy to further evaluate recently worsening LUTS and inc significantly greater than left. Patient not interested in salvage therapy or initiation of ADT. Elected observation with repeat PSA in 3 months. - 6/2020 F/U: PSA almost double to 12.4 ng/mL. Elected to start ADT; preferably intermittent ADT.  Started o intravesical median lobe.   Bladder outlet obstruction appreciated with potential prostate cancer nodules growing into the bladder neck.       PMH: HTN, HLD, DM, CAD S/P PCI in 2004 and CABG in 2020, gout, prostate cancer, history of urethral stricture.    Component TESTOSTERONE   Latest Ref Rng & Units 86.98 - 780.10 ng/dL   4/1/2021 7.98 (L)   10/6/2020 13.28 (L)       IMAGING:    CT Abdomen & Pelvis with IV (2/4/2020):  1. There is a history of prostate carcinoma with recent biochemical recurrence.   B trabeculated with cellules. U.O's: Normal    Trabeculation: +2-3    POST CYSTOSCOPY MEDICATIONS: sample one tablet Cipro 500 mg given to patient    DIAGNOSIS: Bladder neck obstruction possibly from locally advanced recurrent prostate cancer.       Love Sanchez discussed risks including not limited to anesthetic complications, bleeding, infection, injury to surrounding organs or structures, bladder neck contracture, increased risk of post operative urinary incontinence given previous history of radiation to the p

## 2021-05-07 NOTE — PROGRESS NOTES
HPI:    Patient ID: Mikie Rod is a 65year old male. HPI  Patient is here for follow-up on chronic medical issues as listed below. Last seen here in November. No changes made at that time.   Blood pressure was a little bit elevated at that bobbi Lower urinary tract symptoms (LUTS)     Androgen deprivation therapy     Chest pain     Chest pain, unspecified type     Acute coronary syndrome (HCC)     Abnormal finding on imaging     Medication management         HISTORY:  Past Medical History:   Diag with meals 60 tablet 5   • METOPROLOL TARTRATE 100 MG Oral Tab TAKE ONE TABLET BY MOUTH TWICE DAILY  180 tablet 1   • ATORVASTATIN 40 MG Oral Tab TAKE ONE TABLET BY MOUTH ONE TIME DAILY  90 tablet 1   • acetaminophen 500 MG Oral Tab Take 1,000 mg by mouth SWELLING  Ibuprofen               RASH     PHYSICAL EXAM:   /68 (BP Location: Right arm, Patient Position: Sitting, Cuff Size: large)   Pulse 58   Resp 20   Ht 5' 9\" (1.753 m)   Wt 205 lb (93 kg)   BMI 30.27 kg/m²      Physical Exam  Constitutional: mild elevation of triglycerides. Continue current dose of atorvastatin. 5. Malignant neoplasm of prostate ([address]  Evidence of recurrence. Patient will be seeing the oncologist soon.   Also back to the urologist for removal of some bladder wall abnormali

## 2021-05-07 NOTE — TELEPHONE ENCOUNTER
Patient seen in office, scheduled Cystoscopy, Channeling Transurethral resection of the prostate, Friday 05/21/2021, [facility]/outpatient, went over  Pre-op instructions, all labs were done, all questions answered.

## 2021-05-08 NOTE — PROGRESS NOTES
I was called by the patient this morning.  He says he experiencing low back pain that began early this morning.  Notes from his primary urologist Dr. 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  The patient has been able to pass urine.  His preliminary urine culture obtained on May 7, 2 021 is negative thus far.  The patient has been taking Tylenol his last dose only about 15 minutes ago.  I suggested that he give this some time to see if it is effective continue to monitor urine output and signs for fevers or chills.  If symptoms continue to worsen I advised the patient to go to the emergency room for further evaluation.

## 2021-05-08 NOTE — ED INITIAL ASSESSMENT (HPI)
PT came in for lower back pain that wraps to the front sine this morning. Had a cystoscopy on Thursday for urinary retention. Reports weakness/fatigue, nausea. Denies vomiting/diarrhea or dysruia/hematuria.  RR even and nonlabored, speaking in full sentence

## 2021-05-08 NOTE — ED PROVIDER NOTES
Patient Seen in: [facility] AND [facility] Emergency Department    History   Patient presents with:  Back Pain      HPI    25-year-old male presents the ER with complaint of bilateral flank pain and inability to completely empty his bladder.  Patient on May 6 had a Father         TIA   • Other (Lung cancer) Brother                 Smoking Status: Social History    Socioeconomic History      Marital status: Single      Spouse name: Not on file      Number of children: Not on file      Years of education: N External ear normal.      Nose: Nose normal.   Eyes:      Conjunctiva/sclera: Conjunctivae normal.      Pupils: Pupils are equal, round, and reactive to light. Cardiovascular:      Rate and Rhythm: Normal rate and regular rhythm.       Heart sounds: Cleotha Boozer W/ DIFFERENTIAL[[id number]]          Abnormal            Final result                 Please view results for these tests on the individual orders.    RAINBOW DRAW BLUE   RAINBOW DRAW LAVENDER   RAINBOW DRAW LIGHT GREEN   RAINBOW DRAW GOLD         Imaging Re Prescribed:  Current Discharge Medication List    START taking these medications    ondansetron 4 MG Oral Tablet Dispersible  Take 1 tablet (4 mg total) by mouth every 4 (four) hours as needed for Nausea.   Qty: 10 tablet Refills: 0

## 2021-05-08 NOTE — ED QUICK NOTES
Pt to ED per lower back pain rated 7/10 starting 2 hours ago. Pain radiates to lower back. Pt c/o urinary urgency and hesitancy. Pt is awake, alert, oriented, ambulates to BR. No urine output. Pt medicated for pain per MD orders.  Wu inserted  MD notifie

## 2021-05-10 NOTE — TELEPHONE ENCOUNTER
Per pt has blockage in bladder and is scheduled for surgery. Pt had cysto 5/6 and could not urinate. Pt went to er and cath was placed. Per pt has questions about upcoming surgery as well.  Per pt dr suggested he discontinue plavix for 5 day prior to surger

## 2021-05-11 NOTE — TELEPHONE ENCOUNTER
Faxed cardiac clearance to ' office: how long can patient hold plavix     Faxed: medical clearance to [facility]' office   I will await recommendations. insulin aspart (NOVOLOG FLEXPEN) 100 UNIT/ML pen      HISTORICAL ONLY ON MEDICATION LIST    Last Office Visit : 9-  Future Office visit:  NONE    Routing refill request to provider for review/approval because:  Medication is reported/historical    Kathleen M Doege RN

## 2021-05-11 NOTE — PROGRESS NOTES
I called the pt into the exam room and I verified the pt's name and  and I explained that I will need to get him on the exam table and have him drop his bottom clothing to his ankles in order to access his stat lock at the Rt lower leg.  I then assisted

## 2021-05-11 NOTE — TELEPHONE ENCOUNTER
Noted.     Dr. Gurpreet Simon orders for albarran are as follows;     lets try a voiding trial. A PVR of 250-300 is pretty normal for him. Patient contacted, JOVI Yadav made for 5/13 .

## 2021-05-11 NOTE — TELEPHONE ENCOUNTER
Noted. I will call patient to see how he is doing w/ albarran, waiting for orders from Dr. Etelvina Brown. Spoke to patient. Patient states albarran is draining clear yellow urine. Denies hematuria. Patient tells me \"clip\" has come off of \"tube\".   I determined

## 2021-05-11 NOTE — TELEPHONE ENCOUNTER
See yesterday's mychart message from patient. Patient contacted. Patient went to ER 3 days ago, found to be in urinary retention, albarran placed. Patient states urine is draining clear yellow.   Having trouble w/ stat lock, will come in today for stat loc

## 2021-05-11 NOTE — TELEPHONE ENCOUNTER
From: Ronnie Parry  To: Tamar Ospina MD  Sent: 5/10/2021 6:07 PM CDT  Subject: Visit Follow-up Question    Dr Campuzano May, as you know I had to go to the emergency room Saturday a.m. and had a catheter inserted since I was not able to urinate and wh

## 2021-05-12 NOTE — TELEPHONE ENCOUNTER
Pt called in stating that he has questions about medication Clopidogrel Bisulfate (PLAVIX) 75 MG Oral Tab, he states he may need to withhold this medication before a procedure with Dr. Villatoro July.  Please follow up

## 2021-05-12 NOTE — PROGRESS NOTES
Information provided on darolutamide: mechanism of action and side effects. Process of medication dispensing and copay help if needed reviewed. Hand outs provided. Discussed resources such as [employer], per pt request and [employer].

## 2021-05-12 NOTE — TELEPHONE ENCOUNTER
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 Gustavo Cantu at [address] [facility] last saw him 1/28/21, needs clearance and instruction on holding plavix.     triple bypass last fall  Stents 20 years ago    P

## 2021-05-13 NOTE — ED PROVIDER NOTES
Patient Seen in: [facility] AND [facility] Emergency Department      History   Patient presents with:  Urinary Retention    Stated Complaint: difficulty urinating    HPI/Subjective:   HPI    Patient is a 75-year-old man that underwent cystoscopy a week ago.   He Never used    Alcohol use: Not Currently      Alcohol/week: 0.0 standard drinks      Comment: wine, 2-3 glasses weekly    Drug use: Never             Review of Systems    Positive for stated complaint: difficulty urinating  Other systems are as noted in HP is 357 cc. MDM      After inserted by RN with good urine output will discharge home. He will follow up with urology tomorrow via the phone.                              Disposition and Plan     Clinical Impression:  Urinary retention  (primary enco

## 2021-05-13 NOTE — CONSULTS
[facility]    PATIENT'S NAME: Nick Mikaela   CONSULTING PHYSICIAN: Francoise Alexandra.  Clarisa Glover MD   PATIENT ACCOUNT #: [de-identified] LOCATION: [address] [place] [address] RECORD #: [id number] YOB: 1950   CONSULTATION DATE: 2021       CANCER MEDICAL HISTORY:  His brother was a smoker, had lung cancer. No other reported history of malignancy in the family. REVIEW OF SYSTEMS:  All other systems reviewed and negative x12. PHYSICAL EXAMINATION:    GENERAL:  No acute distress.   Alert and o Thank you very much for the consultation request and for allowing us to participate in the care of this delightful patient. Dictated By Margaret Santana MD  d: 05/12/2021 16:35:05  t: 05/13/2021 08:00:37  Job [id number]  [facility]/    cc: Elly Mena

## 2021-05-13 NOTE — TELEPHONE ENCOUNTER
S/w pt; identity verified with name & .     - Pt transferred from [address] @ 4pm.      -He reports intake since 11:30am today as 40 oz; and output \"not even 1 oz. \"     - Instructed to present to Urology by 5pm for catheter re-insertion.   However, p

## 2021-05-13 NOTE — TELEPHONE ENCOUNTER
Message to patient with Dr. Inez Espinoza instructions and asked he respond to how he is feeling. Will do clearance letter once he responds.

## 2021-05-13 NOTE — TELEPHONE ENCOUNTER
If no new symptoms stable from cardiac standpoint for cystoscopy.   May hold clopidogrel for 5 days prior to procedure and resume after

## 2021-05-13 NOTE — PROGRESS NOTES
Pt to Urology for voiding trial/ de-cath. Identity verified with name & ; procedure explained. He is ambulatory with cane; assisted onto exam table; & privacy provided.   I unclipped albarran & removed stat-lock; detached/ discarded extension tubing/ colle

## 2021-05-13 NOTE — ED INITIAL ASSESSMENT (HPI)
Pt ambulatory into triage with steady gait. Pt states that he had a catheter removed 11:30 this am and has not been able to urinate since then. Pt had recent cystoscopy, and then could not urinate afterwards and had the catheter placed.    Pt denies abd/p

## 2021-05-14 NOTE — TELEPHONE ENCOUNTER
S/w Ratna Michelle , and he denies any c/o of SOB, CP. Feels good is out walking daily. Letter sent for clearance.

## 2021-05-17 NOTE — TELEPHONE ENCOUNTER
Dr. Abi Velasquez, please see patient's message below. [id number] [address] for him to have tylenol PM?     Patient has albarran again, inserted in ER 4 days ago. Surgery scheduled for Friday.

## 2021-05-17 NOTE — TELEPHONE ENCOUNTER
Received message from [employer] that copay for darolutamide is over $2000/ month. Reassured that this is not uncommon and we can assist with applying for copay assistance either from a foundation or from the drug company.   We will be in touch to assist wit

## 2021-05-17 NOTE — TELEPHONE ENCOUNTER
----- Message from [address] [place] [address].  Beth Alaniz sent at 5/16/2021  4:50 PM CDT -----  Regarding: Non-Urgent Medical Question  Contact: [phone]  dr Gustavo Cantu, I know I am permitted to take Tylenol at this time but does that include Tylenol PM--with Diphenhydrami

## 2021-05-17 NOTE — TELEPHONE ENCOUNTER
From: Lacretia Market  To: Chandra Orantes MD  Sent: 5/16/2021 4:50 PM CDT  Subject: Non-Urgent Medical Question    dr Chreise Tucker, I know I am permitted to take Tylenol at this time but does that include Tylenol PM--with Diphenhydramine( 25 mg)?     LADONNA HERMAN

## 2021-05-21 PROBLEM — N40.1 ENLARGED PROSTATE WITH URINARY RETENTION: Status: ACTIVE | Noted: 2021-01-01

## 2021-05-21 PROBLEM — R33.8 ENLARGED PROSTATE WITH URINARY RETENTION: Status: ACTIVE | Noted: 2021-01-01

## 2021-05-21 NOTE — INTERVAL H&P NOTE
Pre-op Diagnosis: Recurrent prostate cancer ([facility] [address] Todd Weston    The above referenced H&P was reviewed by Huyen Peck MD on 5/21/2021, the patient was examined and no significant changes have occurred in the patient's condition since the H&P was performed.

## 2021-05-21 NOTE — PROGRESS NOTES
[facility] HOSP - [facility]    Progress Note    Isabelle aMnzo Patient Status:  Outpatient in a Bed    1950 MRN [id number]   Location [address] UNIT Attending Chepe Santamaria MD   Hosp Day # 0 PCP Roger Mac, (H) 05/08/2021     05/08/2021    K 3.8 05/08/2021     05/08/2021    CO2 25.0 05/08/2021     (H) 05/08/2021    CA 9.9 05/08/2021    ALB 3.9 04/28/2021    ALKPHO 118 (H) 04/28/2021    BILT 0.7 04/28/2021    TP 7.1 04/28/2021    AST 12 (L)

## 2021-05-21 NOTE — ANESTHESIA POSTPROCEDURE EVALUATION
Patient: Boaz Sharif    Procedure Summary     Date: 05/21/21 Room / Location: [facility] OR  / [facility] OR    Anesthesia Start: 4280 Anesthesia Stop: 7383    Procedure: Cystoscopy, Channeling Transurethral resection of the prostate (N/A ) Diagnosis:

## 2021-05-21 NOTE — ANESTHESIA PREPROCEDURE EVALUATION
Anesthesia PreOp Note    HPI:     Denis De La Fuente is a 65year old male who presents for preoperative consultation requested by: Sarah Easton MD    Date of Surgery: 5/21/2021    Procedure(s):  Cystoscopy, Channeling [place]   • CAD (coronary artery disease)     x2; chemical stress test normal, done in [facility] Oct 2014   • Cancer [facility]) 2015   • Diabetes [facility])    • Esophageal reflux    • Essential hypertension 1993   • Gout    • Heart disease    • tablet (10 mg total) by mouth daily. , Disp: 90 tablet, Rfl: 3, 5/21/2021 at 0700  tamsulosin (FLOMAX) cap, TAKE ONE CAPSULE BY MOUTH ONE TIME DAILY  (Patient taking differently: Take 0.4 mg by mouth nightly.  ), Disp: 90 capsule, Rfl: 3, 5/20/2021 at 1900 Monitoring Suppl Does not apply Device, Check Blood sugar daily E11.9  Dispense per INS coverage, Disp: 1 Device, Rfl: 0  Glucose Blood (BLOOD GLUCOSE TEST) In Vitro Strip, Check Blood sugar daily E11.9-  Dispense per INS coverage, Disp: 100 strip, Rfl: 3 Concern: Not Asked        Stress Concern: Not Asked        Weight Concern: Not Asked        Special Diet: Not Asked        Back Care: Not Asked        Exercise: Not Asked        Bike Helmet: Not Asked        Seat Belt: Not Asked        Self-Exams: Not Aske blood pressure is 136/70 and his pulse is 54. His respiration is 17 and oxygen saturation is 96%.     05/20/21  0902 05/21/21  0911   BP:  136/70   Pulse:  54   Resp:  17   Temp:  98 °F (36.7 °C)   TempSrc:  Oral   SpO2:  96%   Weight: 91.6 kg (202 lb) 91.6

## 2021-05-21 NOTE — ANESTHESIA PROCEDURE NOTES
Airway  Urgency: Elective      General Information and Staff    Patient location during procedure: OR  Anesthesiologist: Linn Comer MD  Performed: anesthesiologist     Indications and Patient Condition  Indications for airway management: anesthesia  S

## 2021-05-21 NOTE — PLAN OF CARE
Problem: Patient Centered Care  Goal: Patient preferences are identified and integrated in the patient's plan of care  Description: Interventions:  - What would you like us to know as we care for you?   - Provide timely, complete, and accurate informatio distraction and/or relaxation techniques  - Monitor for opioid side effects  - Notify MD/LIP if interventions unsuccessful or patient reports new pain  - Anticipate increased pain with activity and pre-medicate as appropriate  Outcome: Progressing     Prob needed  - Instruct patient on self management of diabetes  Outcome: Progressing  Goal: Electrolytes maintained within normal limits  Description: INTERVENTIONS:  - Monitor labs and rhythm and assess patient for signs and symptoms of electrolyte imbalances staff  - Avoid use of toothpicks and dental floss  - Use electric shaver for shaving  - Use soft bristle tooth brush  - Limit straining and forceful nose blowing  Outcome: Progressing     Problem: Impaired Functional Mobility  Goal: Achieve highest/safest

## 2021-05-21 NOTE — OPERATIVE REPORT
[facility]   Urology Operative Note     Maximiliano Davis Location: OR   CSN [id number] MRN [id number]   Admission Date 5/21/2021 Operation Date 5/21/2021   Service Urology Surgeon Jackie Wick MD      Primary Surgeon: Jackie Wick, resection of the prostate, urethral calibration. Anesthesia: General endotracheal.      Surgical Findings:   1. Narrow fossa navicularis, calibrated using sequential Nancy Franklin sounds to 28 Fr. 2. Bipolar channeling TURP performed in saline.  Yuriy Dunham neck and prostatic fossa were noted to be flat with the bladder floor at that point. Care was taken not to undermine the bladder neck. The resection was then continued in the floor of the prostate to the proximal aspect of the verumontanum.     I then tur supine, general anesthesia was reversed, he was successfully extubated and transported to the recovery room in a stable condition. He tolerated the procedure well without any immediate complications. Estimated Blood Loss: 5 mL.       Complications: None

## 2021-05-22 PROBLEM — Z01.818 PREOPERATIVE TESTING: Status: ACTIVE | Noted: 2021-01-01

## 2021-05-22 NOTE — PLAN OF CARE
Andrae Causey is doing well this evening. Denies any pain. CBI infusing at slow rate, urine light pink in color with no clots noted in 3-way catheter. IVF infusing per orders. Tolerating carb controlled diet. Accuchecks ACHS. Patient remains AOx4, on room air.  SCD's WBC  - Administer growth factors as ordered  - Implement neutropenic guidelines  Outcome: Progressing     Problem: SAFETY ADULT - FALL  Goal: Free from fall injury  Description: INTERVENTIONS:  - Assess pt frequently for physical needs  - Identify cognitiv appropriate  - Fluid restriction as ordered  - Instruct patient on fluid and nutrition restrictions as appropriate  Outcome: Progressing  Goal: Hemodynamic stability and optimal renal function maintained  Description: INTERVENTIONS:  - Monitor labs and ass for mobility and gait  - Educate and engage patient/family in tolerated activity level and precautions  Outcome: Progressing

## 2021-05-22 NOTE — PROGRESS NOTES
[facility]    Progress Note    Lisaatrice Romberg Patient Status:  Outpatient in a Bed    1950 MRN [id number]   Location [facility] 4W/SW/SE Attending Joel Muniz,  [facility] Day # 0 PCP Abdirizak Stringer MD     CC: Steven Castillo 20 mg Oral BID   • metFORMIN HCl  500 mg Oral BID with meals   • Metoprolol Tartrate  100 mg Oral BID   • tamsulosin HCl  0.4 mg Oral Nightly   • Insulin Aspart Pen  1-7 Units Subcutaneous TID CC       Current PRN Inpatient Meds:      Normal Saline Flush, status: Full   · Dispo: per clinical course      Greater than 35 minutes spent, >50% spent coordinating care with providers and counseling re: treatment plan and workup    Layne Mejia MD              This note was prepared using [address] [place] [employer]

## 2021-05-22 NOTE — DISCHARGE SUMMARY
[facility]    Discharge Summary    Analia Hoskins Patient Status:  Outpatient in a Bed    1950 MRN [id number]   Location [facility] 4W/SW/SE Attending Chayito Valentin MD   Hosp Day # 0 PCP Dianelys Quiñones MD     Date Riaz filed at 5/22/2021 0824  Gross per 24 hour   Intake 2682 ml   Output 1850 ml   Net 832 ml         CULTURE:   No results found for this visit on 05/21/21.     IMAGING STUDIES: SOME MAY NEED FOLLOW UP WITH PCP         LABS :     Lab Results 120 tablet  Refills: 11     HYDROcodone-acetaminophen 5-325 MG Tabs  Commonly known as: Nora Gill  What changed: additional instructions      Take 2 tablets by mouth every 4 (four) hours as needed for Pain.    Quantity: 30 tablet  Refills: 0     tamsulosin HCl known as: LOPRESSOR      TAKE ONE TABLET BY MOUTH TWICE DAILY   Quantity: 180 tablet  Refills: 1     multivitamin Tabs      Take 1 tablet by mouth daily.    Quantity: 30 tablet  Refills: 0     ondansetron 4 MG Tbdp  Commonly known as: ZOFRAN-ODT      Take 4 extra strength Tylenol   4.  Avoid constipation, avoid straining to have a bowel movement; if you become constipated, please take a laxative   5.  To switch from leg bag during the day, to large bag and night, please wash your hands and to treat any connec

## 2021-05-22 NOTE — PLAN OF CARE
Pt educated on discharge instructions and albarran care. CBI discontinued and valve placed. Pt albarran bag changed by RN and shown how to properly change bag. Pt given sufficient amount of supplies for discharge. Albarran intact and working properly upon discharge.

## 2021-05-22 NOTE — PROGRESS NOTES
Liban Pineda is a 65year old male. HPI:   No chief complaint on file.     COVERING     History provided by patient is feeling well status post 5/21/2021 channel TURP for obstructing BPH tissue and recurrent bladder cancer invading bladd Disease Mother         congenital heart disease (casue of death)   • Stroke Father         TIA   • Other (Lung cancer) Brother                Social History: Social History    Tobacco Use      Smoking status: Never Smoker      Smokeless tobacco (DEX-4) chewable tab 4 tablet, 4 tablet, Oral, Q15 Min PRN **OR** dextrose 50 % injection 50 mL, 50 mL, Intravenous, Q15 Min PRN **OR** glucose (DEX4) oral liquid 30 g, 30 g, Oral, Q15 Min PRN **OR** Glucose-Vitamin C (DEX-4) chewable tab 8 tablet, 8 table Negative Negative   GLUUR  --  Negative Negative Negative Negative   KETUR  --  Negative Negative Negative Negative   BILUR  --  Negative Negative Negative Negative   BLOODURINE  --  Negative Negative Negative Moderate*   NITRITE negative Negative Negative Finalized by (CST): Erma Santillan MD on 5/06/2021 at 4:25 PM                       Review of previous records:   5/21/2021 channel TURP by Dr. Priti Gerardo        ASSESSMENT/PLAN:   Assessment     COVERING     1.   Endoscopically obstructing pr

## 2021-05-24 NOTE — TELEPHONE ENCOUNTER
See 5/22/2021 hospital progress note from Dr. Della Wilburn (covering [facility]) for orders. Patient states urine in albarran bag is clear yellow, denies hematuria. Offered patient RN visit tomorrow @ 8:40 am for voiding trial/decath. Patient agreed.      Patient den

## 2021-05-25 NOTE — PROGRESS NOTES
Patient presents for voiding trial decath. Physician's order verified. See 5/25/2021 TE ; physician to call for MD's orders. Patient assisted to exam table. Urine in albarran bag is noted to be clear yellow. Albarran detached from tubing/leg bag.   Patient's

## 2021-05-26 NOTE — TELEPHONE ENCOUNTER
Confirmed delivery with Andreas of Darolutamide 300 mg tablets. Reviewed the following:  Dose verified and administration:  Take two 300 mg tablets with food twice daily. As close to every 12 hours as possible. Do not crush or break tablets in half.   [facility]

## 2021-05-26 NOTE — TELEPHONE ENCOUNTER
Addendum:  Lupron injections scheduled with Urology, ([facility]). Last injection: Eligard 45 mg 6 month depot given on April 7 2021.

## 2021-05-26 NOTE — TELEPHONE ENCOUNTER
[employer] sent a fax to confirm the shipment of the patient's Nubeqa prescription. The medication was shipped via [employer] on 5/25/21 with the tracking number [id number].

## 2021-05-26 NOTE — TELEPHONE ENCOUNTER
LM indicating delivery of darolutamide should be arriving today, Daniel Cook approved with $0 copay. Please call back and ask to speak with clinic RN when medication arrives to review drug, dose and education on medication.

## 2021-06-03 NOTE — PROGRESS NOTES
[facility] Urology  Follow-Up Visit    HPI: Antonio Boyer is a 65year old male presents for a follow up visit. Patient was last seen on 5/6/2021. INTERVAL HISTORY: patient is status post bipolar channeling TURP completed 5/21/2021.   He passed months of ADT.       Patient denies stress urinary incontinence following treatment however has erectile dysfunction and is no longer sexually active.     His PSA ori was 0.1 ng/mL in 05/2017. PSA has been checked annually and has been rising.   Patient m Inadequate voided volume of 41 mL's. Q max 2.2 mL/s, Q average 5.7 mL/s, voiding time 1 minute 17 seconds, flow time 7.2 seconds.   Bladder scan for PVR revealed 10 mL's.    - 03/2020 Office cystourethroscopy: No evidence of recurrence of urethral strictur Physical Exam    Constitutional: He is oriented to person, place, and time. He appears well-developed. HENT:   Head: Normocephalic. Eyes: Conjunctivae are normal.   Cardiovascular: Normal rate.     Pulmonary/Chest: Effort normal.   Genitourinary: cysts. 5. Coronary and peripheral atherosclerosis. NM Bone Scan (2/11/2020):  1. No evidence of bone metastases. Axumin PET-CT (03/2020):  1. Abnormal F 18 Fluciclovine PET-CT study.   2. There is intensely increased activity in the posterior pros right back worse when laying down and better when he starts moving. Dipstick UA without gross signs of UTI. We will further evaluate starting with a renal bladder ultrasound to rule out hydronephrosis. Continue Flomax for the time being.     All amparoi

## 2021-06-03 NOTE — PROGRESS NOTES
HPI:    Patient ID: Maximiliano Davis is a 65year old male. HPI Back pain  65year old male presents to the office with right sided back pain. He is walking with a cane and states he has had a decrease in mobility.  He has a hx of prostate cancer and s cancer [facility]) May 2015    Treated with hormonal injection Trelstar x 1 in May 2015.  Then external beam radiation in Sept 2015; and brachytherapy in Dec 2015   • Rheumatoid arthritis ([facility] [address]     bilateral knees   • Urinary retention    • Visual impairment palpitations and leg swelling. Gastrointestinal: Negative for abdominal pain, constipation, diarrhea, nausea and vomiting. Endocrine: Negative for cold intolerance and heat intolerance. Genitourinary: Negative for dysuria and hematuria.    Musculoskel 30 tablet 0   • Vitamin C 500 MG Oral Tab Take 1 tablet (500 mg total) by mouth 3 (three) times daily. 90 tablet 0   • B Complex Vitamins (VITAMIN-B COMPLEX OR) Take by mouth.      • Blood Glucose Monitoring Suppl Does not apply Device Check Blood sugar ernestine There is no mass. Tenderness: There is no abdominal tenderness. There is no right CVA tenderness, left CVA tenderness or guarding. Musculoskeletal:         General: Tenderness (Tenderness in thoracic mid back) present.       Cervical back: Normal ran calcification.           Dr Jesusita Bradshaw informed patient of results.  (See Dr Jesusita Bradshaw phone messages for details)             Other Visit Diagnoses     Chronic right-sided thoracic back pain    -  Primary    Relevant Orders    CT ABDOMEN+PELVIS Lula Wilson 2D R

## 2021-06-03 NOTE — TELEPHONE ENCOUNTER
Action Requested: Summary for Provider     []  Critical Lab, Recommendations Needed  [] Need Additional Advice  []   FYI    []   Need Orders  [] Need Medications Sent to Pharmacy  []  Other     SUMMARY: OV scheduled with Skyla Pineda for eval back pain    P
Bouchra Saavedra Faafanny Kaufman

## 2021-06-03 NOTE — TELEPHONE ENCOUNTER
Dr. Alfonso Dillon and Evelin Rhodes his APN came to my office and informed me that Corine Kirk came and saw Aubrey Mins after my appointment and discussed his back pain with her.   She obtained a stat CT of the abdomen and pelvis which revealed evidence of new lytic metastas

## 2021-06-04 NOTE — PROGRESS NOTES
Cancer Center Progress Note    Patient Name: Ac Cohen   YOB: 1950   Medical Record Number: [id number]   Attending Physician: Wayne Graham M.D.      Chief Complaint:  Prostate cancer    History of Present Illness:  Cancer history:  65 radiation in Sept 2015; and brachytherapy in Dec 2015   • Rheumatoid arthritis ([facility] [address]     bilateral knees   • Urinary retention    • Visual impairment     contacts       Past Surgical History:  Past Surgical History:   Procedure Laterality Date   • ANGIOPLA 4 MG Oral Tablet Dispersible, Take 4 mg by mouth every 8 (eight) hours as needed for Nausea.  , Disp: , Rfl:   •  Darolutamide 300 MG Oral Tab, Take 600 mg by mouth 2 (two) times a day. (Patient taking differently: Take 600 mg by mouth 2 (two) times a day. HCl 2 % Ophthalmic Solution, Place 1 drop into both eyes 2 (two) times a day.  , Disp: , Rfl:   •  Brimonidine Tartrate-Timolol (COMBIGAN) 0.2-0.5 % Ophthalmic Solution, INSTILL ONE DROP INTO BOTH EYES 2 TIMES DAILY, Disp: , Rfl:   •  Vitamin D, Cholecalci (three) times daily. , Disp: 90 tablet, Rfl: 0  B Complex Vitamins (VITAMIN-B COMPLEX OR), Take by mouth., Disp: , Rfl:   Blood Glucose Monitoring Suppl Does not apply Device, Check Blood sugar daily E11.9  Dispense per INS coverage, Disp: 1 Device, Rfl: 0 MD, 45 mg at 04/07/21 1506          Allergies:    Ibuprofen               RASH  Quinapril               ANGIOEDEMA, SWELLING     Review of Systems:  All other systems reviewed and negative x12    Vital Signs:  /71 (BP Location: Left arm, Patient Posi originally treated with external beam radiotherapy, brachytherapy, androgen deprivation therapy in 2015. As of the spring of 2021, he has a PSA progression despite androgen deprivation therapy as noted above.   –Darolutamide started briefly before bone met

## 2021-06-06 PROBLEM — M54.6 ACUTE RIGHT-SIDED THORACIC BACK PAIN: Status: ACTIVE | Noted: 2021-01-01

## 2021-06-06 NOTE — ASSESSMENT & PLAN NOTE
A/P Patient with a decline in his mobility. The is complaining of right thoracic back pain. He has a history of prostate cancer. Plan  Stat CT of abdomen, kidney, and pelvis. Impression   CONCLUSION:   1. No urolithiasis or renal obstruction.    2. U

## 2021-06-10 NOTE — CONSULTS
[address] [facility] Oncology New Consultation      Patient Name: Sukhdev Adam   MRN: [id number]  PCP: Mohini Vivas MD  Referring Physician: Jerson Ramos MD; Jesús Simmons MD    Diagnosis: recurrent/metastatic prostate cancer, castr renal stone protocol  -No renal stone noted  -New lytic metastasis at T10  -Scattered new sclerotic vertebral and pelvic bone metastases    States pain in LS spine is mild, 2/10, takes Tylenol intermittently  Though also with new-wu neck pain over past mo 300 MG Oral Tab Take 600 mg by mouth 2 (two) times a day. (Patient taking differently: Take 600 mg by mouth 2 (two) times a day.  Hasn't started yet ) 120 tablet 11   • METFORMIN  MG Oral Tab TAKE ONE TABLET BY MOUTH TWICE DAILY with meals 60 tablet every 4 (four) hours as needed for Pain.  (Patient not taking: Reported on 6/10/2021 ) 30 tablet 0       Allergies    Ibuprofen               RASH  Quinapril               ANGIOEDEMA, SWELLING    Social History  -Employment: works part time  -Living situati prostate cancer, I do not feel a stereotactic approach is warranted. We discussed the potential short-term and long-term side effects of radiotherapy. All questions were answered, and no guarantee of safety or efficacy was made.  Alternatives to radiothe

## 2021-06-10 NOTE — PATIENT INSTRUCTIONS
Pt can continue current work duties, cannot lift more than 25 lbs. Dr. Uzma Santos to call you with ct scan results. Call central scheduling and schedule ct scan.

## 2021-06-10 NOTE — PROGRESS NOTES
Nursing Consultation Note  Patient: Dony Velez  YOB: 1950  Age: 65year old  Radiation Oncologist: Dr. Rodriguez Pereira  Referring Physician: [email or web address]  Consult Date: 6/10/2021      Chemotherapy: currently on 180 tablet 1   • ATORVASTATIN 40 MG Oral Tab TAKE ONE TABLET BY MOUTH ONE TIME DAILY  90 tablet 1   • acetaminophen 500 MG Oral Tab Take 1,000 mg by mouth every 6 (six) hours as needed for Pain.      • amLODIPine Besylate 10 MG Oral Tab Take 1 tablet (10 mg [phone], [phone]  [address] [place] [address]  Phone: [phone] Fax: [phone]      Past Medical History:   Diagnosis Date   • Ankle fracture, right 2008   • Atherosclerosis of coronary artery     Negative nuclear chemical Activity      Alcohol use: Not Currently        Alcohol/week: 0.0 standard drinks        Comment: quit 14 months ago      Drug use: Never      Sexual activity: Not Currently        Partners: Female    Other Topics      Concerns:         Service: No HAS advnaced directives and a copy has been requested.   Transportation:  Adequate transportation available for expected visits    Pain:  Pain Loc: Hip (right);Pain Score: 2   ;    ;      Knowledge Deficit Plan Of Care:    Problem:  Knowledge Deficit    Pro

## 2021-06-17 NOTE — TELEPHONE ENCOUNTER
Several questions regarding yesterday's CT scans. Someone was to call him to go over the plan. He has several other questions.   [phone]

## 2021-06-21 NOTE — TELEPHONE ENCOUNTER
Prostate Cancer - [facility] has been having some achy arms, both shoulders and chest at same time the last few days.  He has taken Tylenol q 6 hours and it does seem to help for about 4 hours but than returns, yesterday he had the return pain severe whi

## 2021-06-21 NOTE — TELEPHONE ENCOUNTER
Patient called in stating he has been having chest pain and pain in lower extremities of his body. Patient is currently not having any symptoms. Attempting to contact RN now.  Please follow up patient also gives permission to leave detailed voicemail if no

## 2021-06-21 NOTE — TELEPHONE ENCOUNTER
S/w Chris Ham again and he states that he just realized that his Plavix was stopped at his last hospital stay for cysto . reviewed chart and per MB ,TE 5/12, was told  to hold for 5 days and resume. He will resume plavix asap.

## 2021-06-21 NOTE — TELEPHONE ENCOUNTER
Patient called and he said he started on Liliana He believes he may be having some side-effects. The side effects he says at times are  pain in his arm, shoulders and chest at the same time. He said he took two Tylenol and it went away.  He is concerned th

## 2021-06-21 NOTE — TELEPHONE ENCOUNTER
S/w [place] and he started back at work. 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 He states after a few hours his arm , back knees and chest were aching. States he rested at home and pain resolved.    Discussed no pain now and none

## 2021-06-21 NOTE — TELEPHONE ENCOUNTER
Beckyppy Klinefelter with recommendations for him to contact Cardiology and if he would like a ACV with Genaro Walsh one could be arranged for tomorrow, he verbalizes understanding and will call back for ACV is needed.

## 2021-06-22 NOTE — TELEPHONE ENCOUNTER
Pharmacy is requesting the diagnosis code and statement indicating if patient is insulin dependent or not.     ONETOUCH ULTRA In Vitro Strip 300 strip 0 6/22/2021    Sig:   TEST ONCE DAILY     Route:   (none)     Order #:   [id number]

## 2021-06-23 NOTE — TELEPHONE ENCOUNTER
Rx request for Clpidogrel Bisulfate 75mg passed protocol. Rx filled.     Antiplatelet Medications  Protocol Criteria:  · Appointment scheduled with Cardiology in the past 12 months or in the next 3 months  · CBC in past 12 months  · Hgb greater than 8g.dl

## 2021-06-24 NOTE — PROGRESS NOTES
Cancer Center Progress Note    Patient Name: Pankaj Alaniz   YOB: 1950   Medical Record Number: [id number]   Attending Physician: Sophia Ho M.D.      Chief Complaint:  Prostate cancer    History of Present Illness:  Cancer history:  65 Osteoarthritis    • Prostate cancer [facility]) May 2015    Treated with hormonal injection Trelstar x 1 in May 2015.  Then external beam radiation in Sept 2015; and brachytherapy in Dec 2015   • Rheumatoid arthritis ([facility] [address]     bilateral knees   • Urinary retentio Outpatient Medications:   •  Clopidogrel Bisulfate 75 MG Oral Tab, Take 1 tablet (75 mg total) by mouth daily. , Disp: 90 tablet, Rfl: 0  •  Glucose Blood (ONETOUCH ULTRA) In Vitro Strip, TEST ONCE DAILY, Disp: 300 strip, Rfl: 0  •  aspirin 81 MG Oral Tab, % Ophthalmic Solution, Place 1 drop into both eyes 2 (two) times a day.  , Disp: , Rfl:   •  Brimonidine Tartrate-Timolol (COMBIGAN) 0.2-0.5 % Ophthalmic Solution, INSTILL ONE DROP INTO BOTH EYES 2 TIMES DAILY, Disp: , Rfl:   •  Vitamin D, Cholecalciferol, Tab, Take 1 tablet (20 mg total) by mouth 2 (two) times daily. , Disp: 60 tablet, Rfl: 0  Vitamin C 500 MG Oral Tab, Take 1 tablet (500 mg total) by mouth 3 (three) times daily. , Disp: 90 tablet, Rfl: 0  B Complex Vitamins (VITAMIN-B COMPLEX OR), Take by mo Laboratory:  WBC: 5.9, done on 6/24/2021. HGB: 11.9, done on 6/24/2021. PLT: 173, done on 6/24/2021.            Lab Results   Component Value Date     (H) 05/22/2021    BUN 10 05/22/2021    BUNCREA 18.2 05/22/2021    CREATSERUM 0.55 (L) 05/22

## 2021-06-24 NOTE — TELEPHONE ENCOUNTER
Please advise    Patient has been off work for a month, went back to work recently, has some chest, arms, back , biceps discomfort at night, 5 on scale 1-10, he has relief with tylenol, pain goes down to 1.  He went back to work after 1 month and his job re

## 2021-06-24 NOTE — TELEPHONE ENCOUNTER
LOV 1/28/21 for HTN, CAD, HLD. Prior stents and subsequent bypass. Per LOV note amlodipine increased to 10 mb daily. Advised to F/U in 6 months. No F/U scheduled. I called the patient to triage. Left message to call back.

## 2021-06-28 NOTE — TELEPHONE ENCOUNTER
Jody, please see note below about reproducible chest discomfort after returning to work and advise.  Thank you

## 2021-06-28 NOTE — PROGRESS NOTES
[facility] Urology  Follow-Up Visit    HPI: Ronnie Parry is a 65year old male presents for a follow up visit. Patient was last seen on 5/6/2021. INTERVAL HISTORY: patient is status post bipolar channeling TURP completed 5/21/2021.   He passed been checked annually and has been rising.   Patient met criteria for biochemical recurrence in 10/2019 with a PSA of 6.77 ng/mL, confirmed with a repeat PSA level of 6.97 ng/mL in 01/2020.       - 2/2020 F/U: Metastatic evaluation including nuclear medicin change in appetite, or bone pain. - 2/2020 uroflow and PVR. Inadequate voided volume of 41 mL's. Q max 2.2 mL/s, Q average 5.7 mL/s, voiding time 1 minute 17 seconds, flow time 7.2 seconds.   Bladder scan for PVR revealed 10 mL's.    - 03/2020 Office c otherwise negative.       EXAM:  /76 (BP Location: Left arm, Patient Position: Sitting, Cuff Size: large)   Pulse 59   Ht 5' 9\" (1.753 m)   Wt 200 lb (90.7 kg)   BMI 29.53 kg/m²      Physical Exam    Constitutional: He is oriented to person, place, a evidence of metastatic disease as of 6/2021. On systemic therapy and ADT with Dr. Lauren Cramer from medical oncology. History of urethral stricture status post DVIU in 2015. Worsening LUTS and rising PVR volumes early 2021.  Status post bipolar channeling

## 2021-06-28 NOTE — TELEPHONE ENCOUNTER
Spoke with Yumiko Nuno and reviewed recommendations of [facility] to do the stress echo that has been ordered. He is starting radiation therapy soon and will schedule stress echo. He noted he has norco at home and wondered if he could take that for pain.  Advised he could

## 2021-06-29 NOTE — TELEPHONE ENCOUNTER
Spoke with Dr. Dave Salmeron about Bela report of left arm pain and below note. He okayed changing the stress echo to [address] [place] [address]. Order placed. Contacted [place] advised of Dr. Boy Zuniga change in the order to [facility].  He reports that he took 2 more tylen

## 2021-06-29 NOTE — TELEPHONE ENCOUNTER
Patient requesting to speak with RN regarding orders from Treadmill Stress Test.  States he has issues with knees and concerned that test will effect knees. Please call. Thank you.

## 2021-06-30 NOTE — TELEPHONE ENCOUNTER
Pt states was seen yesterday for severe intermittent left arm pain x few weeks he states he also had \"chest discomfort\". He states everything checked out ok. He states he has he is taking Tylenol with some relief.  He states he was recently diagnosed with

## 2021-06-30 NOTE — ED PROVIDER NOTES
Patient Seen in: [facility] AND [facility] Emergency Department      History   Patient presents with:  Pain    Stated Complaint: Intermittent L Arm pain for multiple days better s/p PRN Tylenol     HPI/Subjective:   HPI    64-year-old male with history of hypert Past Surgical History:   Procedure Laterality Date   • ANGIOPLASTY (CORONARY)  2004    and stent of LCx   • CABG  09/2020    Triple bypass   • CARD ECHO STRESS ECHO/REST AND STRESS(CPT=93350/96784 DM 85994)  2011    per NG: normal stress echo extremities equally x4. Skin: warm and dry, no rashes. Musculoskeletal: neck is supple non tender        Extremities are symmetrical, full range of motion.   No tenderness to palpation or range of motion of the left shoulder, elbow, or wrist.  Bilateral r Plan     Clinical Impression:  Left arm pain  (primary encounter diagnosis)     Disposition:  Discharge  6/29/2021 11:14 pm    Follow-up:  Igor March MD  [address] [place] [address] Dr Ziyad Seque[facility], [address] [facility]

## 2021-06-30 NOTE — ED INITIAL ASSESSMENT (HPI)
Pt to ED with c/o worsening left arm/shoulder pain. Pt with cardiac hx. Pt denies cough or fever. No respiratory distress noted. 97% on room air. Pt denies fall or trauma. Pt on Plavix- hx of bypass and cardiac stent.

## 2021-06-30 NOTE — CM/SW NOTE
Patient requesting taxi home. Patient has money per RN. Called [place] taxi for patient at [phone]. 20 minute ETA given. RN notified.

## 2021-07-01 NOTE — PROGRESS NOTES
[facility] Radiation Treatment Management Note 1-5    Patient:  Mahendra Kern  Age:  65year old  Visit Diagnosis:  Metastatic prostate CA  Primary Rad/Onc:  Dr. Gemma Ivey    Site Delivered Dose (cGy) Prescribed Dose (cGy)

## 2021-07-08 NOTE — PROGRESS NOTES
HPI:    Patient ID: Falguni Finnegan is a 65year old male. HPI  Patient is here for follow-up on emergency room visit for left arm pain. We last saw him in the office back in early May. No changes made at that time.   He has seen numerous specialist with urinary retention     Acute right-sided thoracic back pain         HISTORY:  Past Medical History:   Diagnosis Date   • Ankle fracture, right 2008   • Atherosclerosis of coronary artery     Negative nuclear chemical stress test of the [facility], months ago    Drug use: Never         Review of Systems          Current Outpatient Medications   Medication Sig Dispense Refill   • Clopidogrel Bisulfate 75 MG Oral Tab Take 1 tablet (75 mg total) by mouth daily.  90 tablet 0   • Glucose Blood (ONETOUCH UL ondansetron 4 MG Oral Tablet Dispersible Take 4 mg by mouth every 8 (eight) hours as needed for Nausea.    (Patient not taking: Reported on 7/7/2021 )       Allergies:  Ibuprofen               RASH  Quinapril               ANGIOEDEMA, SWELLING     PHYSICAL

## 2021-07-14 NOTE — PATIENT INSTRUCTIONS
Follow up with Dr. Angelina Chaparro in 5 months December 2021. Call [phone] to schedule a follow up appointment. Please call [phone] with any radiation questions.

## 2021-07-16 NOTE — TELEPHONE ENCOUNTER
Action Requested: Summary for Provider     []  Critical Lab, Recommendations Needed  [x] Need Additional Advice  [x]   FYI    []   Need Orders  [] Need Medications Sent to Pharmacy  []  Other     SUMMARY: Patient states he has had low back pain for about

## 2021-07-16 NOTE — PROGRESS NOTES
Patient completed palliative RT to T9-T11 last week. At initial consult was not having much joint pain. Does have a history of injury at a younger age - fell 150ft and survived. Now endorses pain in sternum (non-cardiac in nature) and bl SI joints.  P

## 2021-07-16 NOTE — TELEPHONE ENCOUNTER
Rx sent to pharmacy for cyclobenziprine. Can take it as needed. Make appt to see me or APC next week for evaluation.

## 2021-07-22 PROBLEM — M54.50 CHRONIC MIDLINE LOW BACK PAIN WITHOUT SCIATICA: Status: ACTIVE | Noted: 2021-01-01

## 2021-07-22 PROBLEM — G89.29 CHRONIC MIDLINE LOW BACK PAIN WITHOUT SCIATICA: Status: ACTIVE | Noted: 2021-01-01

## 2021-07-22 NOTE — PROGRESS NOTES
HPI:    Patient ID: Rodrigo Reid is a 65year old male. HPI Back Pain- Midline lower back  and  pain in sternum (non-cardiac in nature) and bl SI joints. Pain improves with walking around/light stretching.   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 The patient is not nervous/anxious.                Current Outpatient Medications   Medication Sig Dispense Refill   • HYDROcodone-acetaminophen (NORCO) 5-325 MG Oral Tab Take 1 tablet by mouth every 6 (six) hours as needed for P daily. (Patient not taking: Reported on 7/7/2021 ) 30 tablet 0   • ondansetron 4 MG Oral Tablet Dispersible Take 4 mg by mouth every 8 (eight) hours as needed for Nausea.    (Patient not taking: Reported on 7/7/2021 )     • acetaminophen 500 MG Oral Tab Autumn Johnson /65 (BP Location: Left arm, Patient Position: Sitting, Cuff Size: large)   Pulse 80   Ht 5' 9\" (1.753 m)   Wt 188 lb (85.3 kg)   BMI 27.76 kg/m²   Wt Readings from Last 2 Encounters:  07/22/21 : 188 lb (85.3 kg)  06/30/21 : 200 lb (90.7 kg)

## 2021-07-23 PROBLEM — C79.51 BONE METASTASES: Status: ACTIVE | Noted: 2021-01-01

## 2021-07-23 PROBLEM — C79.51 BONE METASTASES (HCC): Status: ACTIVE | Noted: 2021-01-01

## 2021-07-23 NOTE — PROGRESS NOTES
Cancer Center Progress Note    Patient Name: Elie Mondragon   YOB: 1950   Medical Record Number: [id number]   Attending Physician: Mecca Das M.D.      Chief Complaint:  Prostate cancer    History of Present Illness:  Cancer history:  65 Treated with hormonal injection Trelstar x 1 in May 2015.  Then external beam radiation in Sept 2015; and brachytherapy in Dec 2015   • Rheumatoid arthritis ([facility] [address]     bilateral knees   • Urinary retention    • Visual impairment     contacts       Past Surgi (NORCO) 5-325 MG Oral Tab, Take 1 tablet by mouth every 6 (six) hours as needed for Pain., Disp: 30 tablet, Rfl: 0  •  cyclobenzaprine 10 MG Oral Tab, Take 1 tablet (10 mg total) by mouth 3 (three) times daily as needed for Muscle spasms. , Disp: 30 tablet, Dispense per INS coverage, Disp: 1 Device, Rfl: 0  •  Dorzolamide HCl 2 % Ophthalmic Solution, Place 1 drop into both eyes 2 (two) times a day.  , Disp: , Rfl:   •  Brimonidine Tartrate-Timolol (COMBIGAN) 0.2-0.5 % Ophthalmic Solution, INSTILL ONE DROP INT 5  famoTIDine 20 MG Oral Tab, Take 1 tablet (20 mg total) by mouth 2 (two) times daily. , Disp: 60 tablet, Rfl: 0  Vitamin C 500 MG Oral Tab, Take 1 tablet (500 mg total) by mouth 3 (three) times daily. , Disp: 90 tablet, Rfl: 0  B Complex Vitamins (VITAMIN- 4.7, done on 7/23/2021. HGB: 10.2, done on 7/23/2021. PLT: 134, done on 7/23/2021.            Lab Results   Component Value Date     (H) 07/23/2021    BUN 13 07/23/2021    BUNCREA 24.1 (H) 07/23/2021    CREATSERUM 0.54 (L) 07/23/2021    ANIONGAP 6

## 2021-07-28 NOTE — PROGRESS NOTES
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     Recommended Anti-nausea medications (as directed by your provider):  Prochloperazine (Compazine) 10 mg every 6 hours and Ondansetron (Zofran) 8 mg every 8 hours  Take as needed    Other drug specific:   Steroid prep Dexamethasone 8mg twice da triage nurse for further instructions. Skin Care  o Avoid direct sunlight.  o Wear a broad-spectrum sunscreen with an SPF of 30 or higher on any skin exposed to the sun. Re-apply every 2 hours if in the sun and after bathing or sweating.   o For dry skin, http://www.ghotra.info/. html      Safety and Handling of Chemotherapy  While you or your family member is receiving chemotherapy, whether in the clinic or at home, the following precautions must be taken to lessen any exposure to the m medications and for several months or years after therapy. Chemotherapy can have harmful side effects to the fetus, especially in the first trimester.   In addition, menstrual cycles can become irregular during and after treatment, so you may not know if y

## 2021-07-30 NOTE — TELEPHONE ENCOUNTER
Call from [facility], for abnormal stress test. Recommends left heart cath,   Verified HIPAA and LMTCB, please transfer call .

## 2021-07-30 NOTE — TELEPHONE ENCOUNTER
S/w Saman Spotted, and discussed stress test result and need for cardiac cath. Explained procedure. He would like Dr. Kalin Maradiaga to do procedure, looking at august 11 as has chemo treatment on august 4. PAT lab placed. Ticket placed.

## 2021-07-30 NOTE — TELEPHONE ENCOUNTER
Patient called back and would like aug 2. Called cath lab s/w George Hopper  , and they have an opening. S/w Rico Franks and aware cath okay for Monday , to complete labs this weekend. Informed cath lab will call him soon.

## 2021-08-02 NOTE — TELEPHONE ENCOUNTER
Yves Caldwell called stating Gregory Chambers would like Lena Priest to have staged PCI with Ada Fresh on August 13, order started.

## 2021-08-02 NOTE — IVS NOTE
Patient tolerated procedure well. Right groin site clean and dry. Denies chest pain. Tolerated PO food and fluids well. External urinary device placed and hooked to suction, 400cc urine noted. Patient ambulated to bathroom and aguilar.  Discharge and follow-up

## 2021-08-02 NOTE — H&P
[facility] AND CLINICS  History and physical    Denis De La Fuente Patient Status:  Outpatient in a Bed    1950 MRN [id number]   Location [facility] Attending Monty Roberto,  [facility] Day # 0 PCP Mario Cueva MD TRANSURETHRAL ELEC-SURG PROSTATECTOM  05/21/2021    Channeling bipolar TURP.  Dr. Tommy Castillo @ [address] [place] [address]     Family History   Problem Relation Age of Onset   • Heart Disease Mother         congenital heart disease (casue of death)   • Stroke Father         TIA   • cyanosis or edema. Peripheral pulses are 2+. Neurologic: Alert and oriented, normal affect. Skin: Warm and dry.      Laboratory Data:  Lab Results   Component Value Date    PGLU 170 08/02/2021       Imaging:  EKG, blood test and chest x-ray reviewed  Imp

## 2021-08-02 NOTE — PROCEDURES
[facility]    [facility] Cardiac Cath Procedure Note  Antonio Boyer Patient Status:  Outpatient in a Bed    1950 MRN [id number]   Location [facility] Attending Jorge Elam, [address] [facility] Day # 0 PCP Perclose  Recommendations: PCI performed Distal anastomosis of LIMA to LAD via LIMA    Lesion #1 LIMA to LAD and native LAD  Lesion Characteristics-severe torturous, severe calcified. Type non-C lesion.   Pre-intervention stenosis 80%, Post intervention st

## 2021-08-02 NOTE — CARDIAC REHAB
Cardiac Rehab Phase I    Activity:  Distance   Assistance needed   Patient tolerated activity . Education:  Handouts provided and reviewed: [address] Diet: Healthy Cardiac diet reviewed.     Disease Process: Disease process rev

## 2021-08-02 NOTE — TELEPHONE ENCOUNTER
please review     YONIS Thurman Cardio Clinical Staff  Pls review with Dr. Mary Kate De Santiago     Abnormal exam demonstrating a moderate-sized mostly reversible perfusion defect involving the inferior wall mid and basal segments/RCA dis

## 2021-08-04 NOTE — TELEPHONE ENCOUNTER
Reached out to patient to schedule Pre procedure test but patient complained of pain on his lower abdomen and back pain after his procedure on 8/2. [facility] was informed. Pt preferred to be seen tomorrow 8/5.     Will call patient again after office

## 2021-08-04 NOTE — TELEPHONE ENCOUNTER
Called Lena Priest just to touch base to see when he would like to be rescheduled for his treatment. No answer asked him to please call back and his convenience.

## 2021-08-04 NOTE — TELEPHONE ENCOUNTER
From: Piter Hudson  To: Karla Acosta MD  Sent: 8/4/2021 4:23 PM CDT  Subject: Other    Dr Charisma Hopkins, please change the date of upcoming procedure from Wednesday, August 13th to either August 11th or to Monday August 16th.  I am not able to make it

## 2021-08-04 NOTE — TELEPHONE ENCOUNTER
Patient had conversation earlier with cath lab, who then s/w Marybeth in office. I assisted [facility]    Confirmed cath samantha for 11/13 at 1200. , s/w sherly and she states he will need labs and covid test. She will call hi

## 2021-08-04 NOTE — TELEPHONE ENCOUNTER
Pt called in wondering about an appt that is suppose to be scheduled tomorrow at 9am. I do not see anything on the schedule.  Please call

## 2021-08-05 NOTE — TELEPHONE ENCOUNTER
Pt canceled appt scheduled for today at 9 [facility] and requesting to speak with Eugene Holt.  Please call [phone]

## 2021-08-05 NOTE — TELEPHONE ENCOUNTER
.. Procedure staged PCI  scheduled for 8/16/21 with Dr Nancy Last. Betasept given to patient with instructions. Pre Admission Testing instructions were provided. Directions to go to interventional radiology given.  Informed patient that a nurse from the cath lab

## 2021-08-05 NOTE — TELEPHONE ENCOUNTER
Patient is unable to come into the office today for his groin check due to knee pain.   He had left heart cath which showed significant coronary disease intervention was done to the LIMA to the LAD native LAD patient was brought back for staged intervention

## 2021-08-06 NOTE — TELEPHONE ENCOUNTER
Patient was unable to make appointment with [facility] on 8/5. See TE 8/5 for further communication on this matter.

## 2021-08-06 NOTE — TELEPHONE ENCOUNTER
[facility] again, he has had one procedure done and doing fairly well,having another procedure 8/16. He will call after procedure to set up apts with Dr Kya Begum when he is recovering.

## 2021-08-06 NOTE — TELEPHONE ENCOUNTER
----- Message from Berta Jones RN sent at 8/6/2021 12:43 PM CDT -----  Regarding: FW: Visit Follow-up Question  Contact: [phone]  [facility],    He was scheduled this week for chemo , but urgently needed cardiac stents.     Can you call him - I just d

## 2021-08-06 NOTE — TELEPHONE ENCOUNTER
Left message for call back to get condition up date and if he is recovered after stent placement proceeding with chemo after 8/16/21 would be okay.

## 2021-08-06 NOTE — TELEPHONE ENCOUNTER
----- Message from Susan Akbar RN sent at 8/6/2021 12:43 PM CDT -----  Regarding: FW: Visit Follow-up Question  Contact: [phone]  [facility],    He was scheduled this week for chemo , but urgently needed cardiac stents.     Can you call him - I just d

## 2021-08-11 NOTE — TELEPHONE ENCOUNTER
Called Carlos Gandara ,left message to call back    Hemoglobin dropped from 10.2 to 8.1, has another angio scheduled for 8/16        YONIS Huff   8/10/2021  3:58 PM CDT       HGB dropped, send to PCP

## 2021-08-11 NOTE — TELEPHONE ENCOUNTER
Jim Hernandez called back, cbc, bmp reviewed with him, advised to watch for bleeding since his hemoglobin dropped about 2 grams, also we sent results to [address] [place] [address].

## 2021-08-12 NOTE — TELEPHONE ENCOUNTER
Called Andreas aguilar, he has few concerns, noticed purple bruising under each arm , advised it might be from frequent blood pressure readings during last angiogram.  He had questions about upcoming angiogram, advised is scheduled for Monday 8/16 at 9 15am and c

## 2021-08-12 NOTE — TELEPHONE ENCOUNTER
Pt called in stating he has dark marks under his arm that recently appeared and it is irritating him. Pt also has questions about his diet.  Please follow up

## 2021-08-13 NOTE — TELEPHONE ENCOUNTER
Dr. Marielle Ng - patient will have blood draw tomorrow (see 8/9/21 CBC result note), however new symptoms. See below. Please advise.    Please reply to pool: EM RN TRIAGE    Patient returned our call (see 8/9/21 CBC Result Comments, ordered by Genoveva HUNTLEY

## 2021-08-16 NOTE — INTERVAL H&P NOTE
Pre-op Diagnosis: * No pre-op diagnosis entered *    The above referenced H&P was reviewed by Migdalia Samuel MD on 8/16/2021, the patient was examined and no significant changes have occurred in the patient's condition since the H&P was performed.   I discusse

## 2021-08-16 NOTE — DIETARY NOTE
NUTRITION EDUCATION NOTE    Received consult for nutrition education per cardiac rehab order set. Appropriate education and handout(s) provided. See education section of Epic for specifics.     Madelyn Lentz RDN, LDN  Clinical Nutrition  Ext [phone]

## 2021-08-16 NOTE — PROCEDURES
[facility]    Cardiac Cath Procedure Note  Camillo Bridegroom Patient Status:  Outpatient in a Bed    1950 MRN [id number]   Location [facility] Attending Margarita Figueroa, [address] [facility] Day # 0 PCP Paulo Ashley wired subintimal and then reentered distal to the lesion, rewired through the true lumen based on angiography. Attempted to IVUS however IVUS catheter was not long enough given the internal mammary approach.   Pre-dil: 2.5 x 15 mm compliant balloon, 3.0 x

## 2021-08-16 NOTE — CARDIAC REHAB
Cardiac Rehab Phase I    Activity:  Distance   Assistance needed   Patient tolerated activity . Education:  Handouts provided and reviewed: [address] Diet: Healthy Cardiac diet reviewed.     Disease Process: Disease process rev

## 2021-08-17 NOTE — TELEPHONE ENCOUNTER
Reason for call changed to condition update. Pt states that is weight is currently 170. Had triple bypass in sept 2020 and was about 200 pounds at that time. Denies any associated symptoms. No edema. States that has knee pain.  But that is an carlos

## 2021-08-17 NOTE — TELEPHONE ENCOUNTER
Please contact patient and get an update as well as a slightly more detailed message so I can make a clinical decision. Please get information on whether his weight is going up or down. Whether he has edema or any other symptoms.

## 2021-08-18 NOTE — TELEPHONE ENCOUNTER
Called Andreas to review plan that ok to start chemotherapy with Dr Aníbal Yanez after his discussion with IR cardiologist that ok to continue forward with chemotherapy.   Patient feels relatively well, would like to be more active, and taking slow steps in that direct

## 2021-08-18 NOTE — TELEPHONE ENCOUNTER
I called Jackie Luke as promised to follow up with him on restarting chemo after his cardiac procedure. Richard Perez states he did have a procedure this Monday but after the procedure they found another blockage.  He is waiting to hear back from Dr. Kristin Peng to see what the

## 2021-08-19 NOTE — TELEPHONE ENCOUNTER
Left msg this morning for Mr. Rolando Benavides to call back to schedule ASAP denosumab injection start for bony mets/hypercalcemia. Pending start of chemo next week.

## 2021-08-19 NOTE — TELEPHONE ENCOUNTER
Message from nurse no appreciated. No need for any changes right now. He is followed also by cardiology and oncology. I agree with nurse advised to encourage adequate oral intake of calories and nutrients.

## 2021-08-19 NOTE — TELEPHONE ENCOUNTER
Advised patient of Dr. Jovany Reilly note. Patient verbalized understanding. If has any further questions will let us know.

## 2021-08-19 NOTE — TELEPHONE ENCOUNTER
I reached out to Hayden Moran again, no answer, left a detailed message to please call back to schedule injection for tomorrow sometime and Chemo next week.

## 2021-08-20 PROBLEM — D69.6 THROMBOCYTOPENIA (HCC): Status: ACTIVE | Noted: 2021-01-01

## 2021-08-20 PROBLEM — R73.9 HYPERGLYCEMIA: Status: ACTIVE | Noted: 2021-01-01

## 2021-08-20 PROBLEM — R79.89 AZOTEMIA: Status: ACTIVE | Noted: 2021-01-01

## 2021-08-20 PROBLEM — D64.9 ANEMIA, UNSPECIFIED TYPE: Status: ACTIVE | Noted: 2021-01-01

## 2021-08-20 PROBLEM — D64.9 ANEMIA: Status: ACTIVE | Noted: 2021-01-01

## 2021-08-20 PROBLEM — R77.8 ELEVATED TROPONIN: Status: ACTIVE | Noted: 2021-01-01

## 2021-08-20 PROBLEM — R79.89 ELEVATED TROPONIN: Status: ACTIVE | Noted: 2021-01-01

## 2021-08-20 NOTE — ED PROVIDER NOTES
Patient Seen in: [facility] AND [facility] Emergency Department      History   Patient presents with:  Abnormal Labs    Stated Complaint: Severe anemia    HPI/Subjective:   HPI    Patient presents to the emergency department with severe anemia.   He was sent from • TRANSURETHRAL ELEC-SURG PROSTATECTOM  05/21/2021    Channeling bipolar TURP.  Dr. Jewel Tanner @ [address] [place] [address]                Social History    Tobacco Use      Smoking status: Never Smoker      Smokeless tobacco: Never Used    Vaping Use      Vaping Use: Never used Neurological:      Mental Status: He is alert and oriented to person, place, and time.                ED Course     Labs Reviewed   TROPONIN I - Abnormal; Notable for the following components:       Result Value    Troponin 0.731 (*)     All other compone were created for panel order CBC With Differential With Platelet.   Procedure                               Abnormality         Status                     ---------                               -----------         ------                     CBC W/ DIFFERSARBJIT decreased in conspicuity and the adjacent mild superior endplate compression fracture is unchanged. A new 5.5 cm hypodense mass is identified in the right hepatic lobe, likely representing a metastasis.   3. There has been development of perirectal fat str 8/20/2021 Yes    [facility] D69.6 8/20/2021 Yes

## 2021-08-20 NOTE — PROGRESS NOTES
Cancer Center Progress Note    Patient Name: Analia Hoskins   YOB: 1950   Medical Record Number: [id number]   Attending Physician: Olivia Loredo M.D.      Chief Complaint:  Prostate cancer    History of Present Illness:  Cancer history:  65 bone pain. No recent sick contacts, lives with his sister currently. No N/V/D, mild constipation. Denies melena, hematochezia, or any other active bleeding. No skin rash. He is Covid vaccinated. Plans to start chemotherapy next week.     Performanc (casue of death)   • Stroke Father         TIA   • Other (Lung cancer) Brother                 Social History:  Social History    Socioeconomic History      Marital status: Single      Spouse name: Not on file      Number of children: Not on fi Tab, TAKE ONE TABLET BY MOUTH ONE TIME DAILY , Disp: 90 tablet, Rfl: 1  •  acetaminophen 500 MG Oral Tab, Take 1,000 mg by mouth every 6 (six) hours as needed for Pain., Disp: , Rfl:   •  amLODIPine Besylate 10 MG Oral Tab, Take 1 tablet (10 mg total) by m Strip, TEST ONCE DAILY, Disp: 300 strip, Rfl: 0  ondansetron 4 MG Oral Tablet Dispersible, Take 4 mg by mouth every 8 (eight) hours as needed for Nausea.  , Disp: , Rfl:   Darolutamide 300 MG Oral Tab, Take 600 mg by mouth 2 (two) times a day., Disp: 120 t Rate: 999 mL/hr at 08/20/21 1500, 1,000 mL at 08/20/21 1500  [COMPLETED] heparin sodium 1000 UNIT/ML injection, , , ,   [COMPLETED] fentaNYL citrate (SUBLIMAZE) 0.05 MG/ML injection, , , ,   [COMPLETED] Midazolam HCl (VERSED) 2 MG/2ML injection, , , ,   [C PRN, Brandon Ross MD, 250 mL at 21 1229  [] 0.9% NaCl infusion, , Intravenous, Continuous, Glen Tracey MD  [COMPLETED] acetaminophen (TYLENOL EXTRA STRENGTH) 500 MG tab, , , , , 1,000 mg at 21 1325  [COMPLETED] regadenoson ([address] 23.0 08/09/2021       Radiology:    Cancer Staging  No matching staging information was found for the patient.     Impression and Plan:  64-year-old male being evaluated by Medical Oncology for metastatic castrate resistant prostate cancer with bone metasta

## 2021-08-20 NOTE — CONSULTS
Inpatient hematology oncology consultation    Date of consultation 8/20/2021    Chief Complaint:  Severe anemia metastatic prostate cancer    History of Present Illness:  Cancer history:  68-year-old male with metastatic castrate resistant prostate cancer. weight loss, denies any change in urinary habits, he denies any new bone pain. No recent sick contacts, lives with his sister currently. No N/V/D, mild constipation. Denies melena, hematochezia, or any other active bleeding. No skin rash.      He is Covid Age of Onset   • Heart Disease Mother         congenital heart disease (casue of death)   • Stroke Father         TIA   • Other (Lung cancer) Brother          2019       Social History:  Social History    Socioeconomic History      Marital status: MOUTH TWICE DAILY , Disp: 180 tablet, Rfl: 1  •  ATORVASTATIN 40 MG Oral Tab, TAKE ONE TABLET BY MOUTH ONE TIME DAILY , Disp: 90 tablet, Rfl: 1  •  acetaminophen 500 MG Oral Tab, Take 1,000 mg by mouth every 6 (six) hours as needed for Pain., Disp: , Rfl: flush, , , ,   [COMPLETED] Nitroglycerin in D5W 200-5 MCG/ML-% injection, , , ,   [COMPLETED] Heparin (Porcine) in NaCl 2 UNITS/ML premix flush, , , ,   [COMPLETED] Heparin (Porcine) in NaCl 2 UNITS/ML premix flush, , , ,   [COMPLETED] norepinephrine (LEVO 81 MG Oral Tab EC, Take 1 tablet (81 mg total) by mouth daily. , Disp: 30 tablet, Rfl: 0  Prochlorperazine Maleate (COMPAZINE) 10 mg tablet, Take 1 tablet (10 mg total) by mouth every 6 (six) hours as needed for Nausea or vomiting., Disp: 30 tablet, Rfl: 2 Monitoring Suppl Does not apply Device, Check Blood sugar daily E11.9  Dispense per INS coverage, Disp: 1 Device, Rfl: 0  Dorzolamide HCl 2 % Ophthalmic Solution, Place 1 drop into both eyes 2 (two) times a day.  , Disp: , Rfl:   Brimonidine Tartrate-Timol 08/20/2021    GLOBULIN 3.7 08/20/2021    AGRATIO 1.5 03/03/2016     08/20/2021    K 4.2 08/20/2021     08/20/2021    CO2 22.0 08/20/2021       Radiology:  None  Cancer Staging  No matching staging information was found for the patient.     Impre

## 2021-08-20 NOTE — ED QUICK NOTES
Orders for admission, patient is aware of plan and ready to go upstairs. Any questions, please call ED RN Pawel Driscoll  at extension [phone] .    Type of COVID test sent: Rapid  COVID Suspicion level: Low    Titratable drug(s) infusing: blood  Rate:100mL/hr    LOC a

## 2021-08-20 NOTE — TELEPHONE ENCOUNTER
Spoke with Jonny Quinones and confirmed he has an appointment today for labwork and injection of xgeva to reduce his calcium levels. He sounded dry when speaking and states he is eating and drinking but has to stop conversations often due to the dryness.   He was con

## 2021-08-20 NOTE — TELEPHONE ENCOUNTER
Patient has an appointment today for 2:30 for labs and injection, he is confused if he is still suppose to keep this appointment today, would like a callback to discuss

## 2021-08-20 NOTE — PROGRESS NOTES
MA went to Lobby to escort patient to Infusion. Patient was not able to stand, MA got wheelchair and attempted to help patient into the wheelchair, patient was unable to stand. Got Gait belt, took 3 people to help patient into wheelchair.     Patient sche

## 2021-08-20 NOTE — H&P
[facility]    PATIENT'S NAME: Karen First   ATTENDING PHYSICIAN: Reilly Hamilton.  Dorian Shah MD   PATIENT ACCOUNT#:   [id number]    LOCATION:  [place] [address]  MEDICAL RECORD #:   [id number]       YOB: 1950  ADMISSION DATE:       0 PAST SURGICAL HISTORY:  As mentioned above. Also had a right orchiectomy for undescended testicle and left knee surgery. MEDICATIONS:  Please see medication reconciliation list.     ALLERGIES:  Ibuprofen and Quinapril.      FAMILY HISTORY:  Mother h telemetry floor. Monitor his hemodynamic status closely. Blood test to rule out hemolysis still pending. Blood transfusion will be initiated once hemolysis is ruled out. Hematology/oncology consult and cardiology consults were notified.   Monitor his Ac

## 2021-08-21 NOTE — CONSULTS
[address] CONSULT NOTE    Denis De La Fuente Patient Status:  Inpatient    1950 MRN [id number]   Location [facility] 3W/SW Attending Johnathan Robertson MD   Hosp Day # 1 PCP Mario Cueva MD     Date of Admission:  8 stranding  Past Medical History  Past Medical History:   Diagnosis Date   • Ankle fracture, right 2008   • Atherosclerosis of coronary artery     Negative nuclear chemical stress test of the heart Tung Purvis) Oct 2014   • CAD (coronary artery disea Social History Narrative    None on file            Current Medications:  0.9% NaCl infusion, , Intravenous, Continuous  ondansetron (ZOFRAN) injection 4 mg, 4 mg, Intravenous, Q6H PRN  metoclopramide (REGLAN) injection 10 mg, 10 mg, Intravenous, Q8H PRN EYES 2 TIMES DAILY  Prochlorperazine Maleate (COMPAZINE) 10 mg tablet, Take 1 tablet (10 mg total) by mouth every 6 (six) hours as needed for Nausea or vomiting.   Glucose Blood (ONETOUCH ULTRA) In Vitro Strip, TEST ONCE DAILY  Lancets (ONETOUCH DELICA PLUS Extremities: Without clubbing, cyanosis. Peripheral pulsespresent. Neurologic: Alert and oriented, normal affect. Motor ok. Skin: Warm and dry.      Results:     Laboratory Data:  Lab Results   Component Value Date    WBC 6.5 08/20/2021    HGB 5.9 (LL) 0

## 2021-08-21 NOTE — PROGRESS NOTES
[facility]    Progress Note    Falguni Mis Patient Status:  Inpatient    1950 MRN [id number]   Location [facility] 3W/SW Attending Mickey Thibodeaux MD   Hosp Day # 1 PCP Roxy Garcia MD       Subjective:     Res cardiology  -Telemetry  -Lopressor  -ECHO    Progressive metastatic prostate cancer  With bony metastasis  Chronic pain  -per onc    Diabetes mellitus type 2, [facility] A1c 7.8  HTN  HL    DVT proph: SCD    D/w RN  D/w Dr. Uzma Rodriguez        Results:     Lab Results superior rectum that is suboptimally assessed by CT technique, these findings could also relate to edema in the setting of focal proctitis, although an underlying postradiation stricture or potentially a rectal mass/adenocarcinoma are highly excluded.   Rec

## 2021-08-21 NOTE — PLAN OF CARE
Patient with complaints of generalized pain that he states is chronic. PRBC's x 2 units were infused over night; currently awaiting H&H result s/p infusion of second unit. Echo ordered for today.      Problem: Patient Centered Care  Goal: Patient preference CARDIOVASCULAR - ADULT  Goal: Maintains optimal cardiac output and hemodynamic stability  Description: INTERVENTIONS:  - Monitor vital signs, rhythm, and trends  - Monitor for bleeding, hypotension and signs of decreased cardiac output  - Evaluate effectiv Progressing  Goal: Hemodynamic stability and optimal renal function maintained  Description: INTERVENTIONS:  - Monitor labs and assess for signs and symptoms of volume excess or deficit  - Monitor intake, output and patient weight  - Monitor urine specific

## 2021-08-21 NOTE — PROGRESS NOTES
[facility]    [address] [facility] [address]    Phone:  [phone]           Marlene Ward   MRN: [id number]    Department:  [facility]   Date of Visit:  6/4/2017           Diagnosis     Acute pharyngitis, unspecified etiology        You were seen by Brennan Quiroz MD.      Disclaimer     Follow-up Care:  It is your responsibility to arrange for follow-up care with your healthcare provider or as instructed. Call to get an appointment time.           Contact your doctor for follow-up appointment if not already scheduled.     Follow up with [facility].    Specialty:  Emergency Medicine    Comments:  If symptoms worsen    Contact information    [address] [facility] [address]  [phone]        Schedule an appointment as soon as possible for a visit with Your Doctor.      Medications you received while in the ED through 06/05/2017  3:03 AM     Date/Time Order Dose Route Action    06/04/2017 11:37 PM sodium chloride (PF) 0.9 % injection 2 mL 2 mL Injection Given    06/04/2017 11:43 PM acetaminophen (TYLENOL) tablet 1,000 mg 1,000 mg Oral Given    06/05/2017 12:44 AM sodium chloride (NORMAL SALINE) 0.9 % bolus 1,000 mL 0 mL Intravenous Completed    06/04/2017 11:44 PM sodium chloride (NORMAL SALINE) 0.9 % bolus 1,000 mL 1,000 mL Intravenous New Bag    06/05/2017 12:36 AM ACETAMINOPHEN 500 MG PO TABS Pyxis Override 0   UBC Duplicate    06/05/2017  1:51 AM sodium chloride (NORMAL SALINE) 0.9 % bolus 1,000 mL 0 mL Intravenous Completed    06/05/2017  1:21 AM sodium chloride (NORMAL SALINE) 0.9 % bolus 1,000 mL 1,000 mL Intravenous New Bag         What to Do with Your Medications      START taking these medications today unless otherwise stated        Details    penicillin v potassium 500 MG tablet   Commonly known as:  VEETID        Take 1 tablet by mouth 3 times daily for 10 days.   Authorizing Provider:   [facility]    Progress Note    Lexis Garcia Patient Status:  Inpatient    1950 MRN [id number]   Location [facility] 3W/SW Attending Stefan Wise MD   Hosp Day # 1 PCP Ronda Layton MD     HPI/Subjective:   Trisha Grace Brennan Quiroz                             Where to Get Your Medications      You can get these medications from any pharmacy     Bring a paper prescription for each of these medications     penicillin v potassium 500 MG tablet               Procedures and tests performed during your visit     Procedure/Test Number of Times Performed    Basic Metabolic Panel 1    CBC & Auto Differential 1    ECG 1    Lactate POC 2    Lactic Acid Venous - Point of Care 2    Rapid strep A POC 1    Save for Possible XMatch 1    Troponin I - Point of Care 1    Urinalysis & Reflex Micro with Culture if Indicated 1    Urine pregnancy POC 1    XR Chest AP or PA 1      Procedures     None      Imaging Results         XR Chest AP or PA (Final result) Result time:  06/05/17 01:36:40    Final result    Impression:    IMPRESSION:      No acute cardiopulmonary findings.    I have reviewed the images and agree with the Resident interpretation.      Narrative:      XR CHEST AP OR PA    HISTORY:  Fever.    COMPARISON:  None.    TECHNIQUE:  Single, AP portable view of the chest.    FINDINGS:    The cardiomediastinal silhouette and pulmonary vasculature are normal. No  focal consolidation. No pleural effusion or pneumothorax.          Preliminary result    Impression:    IMPRESSION:      No acute cardiopulmonary findings.    I have reviewed the images and agree with the Resident interpretation.      Narrative:      XR CHEST AP OR PA    HISTORY:  Fever.    COMPARISON:  None.    TECHNIQUE:  Single, AP portable view of the chest.    FINDINGS:    The cardiomediastinal silhouette and pulmonary vasculature are normal. No  focal consolidation. No pleural effusion or pneumothorax.            Discharge Instructions     None      Discharge References/Attachments     PHARYNGITIS, STREP (CONFIRMED) (Vietnamese)      Medication Safety: What you need to know     Maintain Security - It is important to keep all medications in a secure location:  Keep out of the  Edema not present. Pulmonary/Chest: Effort normal and breath sounds normal.   Abdominal: Bowel sounds are normal. There is no guarding.    No tenderness on palpation right upper quadrant, liver edge not palpable  Ecchymosis in both inguinal regions as endplate compression fracture is unchanged. A new 5.5 cm hypodense mass is identified in the right hepatic lobe, likely representing a metastasis.   3. There has been development of perirectal fat stranding, which is nonspecific and could relate to lymphan reach of children and pets    Consider using a lock box or locked filing cabinet    Place pill bottles in private area such as bedroom or drawer    Don't Share - It is illegal to share your prescription medication, even with family:  The doctor prescribes medications specifically for you and your body    You cannot be sure how the drug may affect others physically or emotionally    It is a criminal offense to share prescriptions    Proper Disposal - It is no longer acceptable to flush or throw away medications:  Recent studies show measurable amounts of medication have been found in drinking water and wildlife due to flushing or throwing away medications    Medication strength changes over time and is not typically safe after one year    Proper disposal removes the medication from your home in a safe way so that others don't have access to it. Use your local drug drop site.    Your local pharmacy can provide information on medication disposal options in your community. The Department of Justice Drug Enforcement Administration website also has information on safe medication disposal:  www.deadiversion.[facility].gov/drug_disposal/index.html          Coronary atherosclerosis.  Post CABG. 3.  Colonic diverticulosis. Assessment & Plan:     Anemia, unspecified type  Hgb this AM 6.8, awaiting repeat CBC following second unit of PRBC.     Haptoglobin increased, bili nl, Alisia neg, no schistocytes, re

## 2021-08-21 NOTE — PLAN OF CARE
Continued cardiac monitoring. 1 unit of prbc given today. Tylenol for pain. Echo cardiogram at bedside. Gi consulted. Vss. Bed low, locked. Side rails up x2. Call light and belongings within reach. Non skid socks on.  Encouraged to call for assistance when rhythm, and trends  - Monitor for bleeding, hypotension and signs of decreased cardiac output  - Evaluate effectiveness of vasoactive medications to optimize hemodynamic stability  - Monitor arterial and/or venous puncture sites for bleeding and/or hematom repeat lab results as appropriate  - Fluid restriction as ordered  - Instruct patient on fluid and nutrition restrictions as appropriate  8/21/2021 1746 by Micaela Deng RN  Outcome: Progressing  8/21/2021 1742 by Micaela Deng, RN  Outcome: Progressing Use soft bristle tooth brush  - Limit straining and forceful nose blowing  8/21/2021 1746 by Herbie Deng RN  Outcome: Progressing  8/21/2021 1742 by Herbie Deng RN  Outcome: Progressing

## 2021-08-22 NOTE — PLAN OF CARE
Problem: Patient Centered Care  Goal: Patient preferences are identified and integrated in the patient's plan of care  Description: Interventions:  - What would you like us to know as we care for you?   - Provide timely, complete, and accurate informatio stability  - Monitor arterial and/or venous puncture sites for bleeding and/or hematoma  - Assess quality of pulses, skin color and temperature  - Assess for signs of decreased coronary artery perfusion - ex.  Angina  - Evaluate fluid balance, assess for ed ordered  - Monitor response to interventions for patient's volume status, including labs, urine output, blood pressure (other measures as available)  - Encourage oral intake as appropriate  - Instruct patient on fluid and nutrition restrictions as appropri

## 2021-08-22 NOTE — PROGRESS NOTES
[facility]    Progress Note    Rubi Lim Patient Status:  Inpatient    1950 MRN [id number]   Location [facility] [place] 3W/SW Attending Charity Westbrook MD   Hosp Day # 2 PCP Marcell Julian MD       Subjective:     Fee of coronary artery disease/CABG'Sep 2020 with recent PCI intervention   + Troponin slightly elevated  No chest pain  Pt was seen by cardiology  -Telemetry  -Lopressor  -ECHO normal systolic functions, EF 67%, no regional wall motion abnormalities, there is in the T10 vertebral body has slightly decreased in conspicuity and the adjacent mild superior endplate compression fracture is unchanged. A new 5.5 cm hypodense mass is identified in the right hepatic lobe, likely representing a metastasis.   3. There has

## 2021-08-22 NOTE — CONSULTS
[facility]   Gastroenterology Consultation Note    Terri Jeffries  Patient Status:    Inpatient  Date of Admission:         8/20/2021, Hospital day #2  Attending:   Yuliya Hurst MD  PCP:     Shayne Bamberger, MD    Chief Comp adjacent mild superior endplate compression fracture is unchanged.  A new 5.5 cm hypodense mass is identified in the right hepatic lobe, likely representing a metastasis.     3.  There has been development of perirectal fat stranding, which is nonspecific a Date   • ANGIOPLASTY (CORONARY)  2004    and stent of LCx   • CABG  09/2020    Triple bypass   • CARD ECHO STRESS ECHO/REST AND STRESS(CPT=93350/31262 DM 68355)  2011    per NG: normal stress echo, (CAD)   • COLONOSCOPY  2013   • FRACTURE SURGERY Right tab 100 mg, 100 mg, Oral, BID    Review of Systems:  CONSTITUTIONAL:  negative for fevers, rigors.  +FATIGUE  EYES:  negative for diplopia   RESPIRATORY:  negative for severe shortness of breath  CARDIOVASCULAR:  negative for crushing sub-sternal chest pain 1. Postprocedural edema in the left groin from the recent vascular intervention. No drainable fluid collection in the left groin or retroperitoneum to suggest hematoma.  2. There is a history of prostate cancer status post remote brachytherapy and external Improved with transfusion. We will need to evaluate GI blood loss with an upper endoscopy and colonoscopy. I discussed this with the patient agrees to proceed.   Last colonoscopy was in 2013, where he had a polyp removed and was found to have diverticulos

## 2021-08-22 NOTE — PROGRESS NOTES
[facility]      Subjective:  Fatigued but no dyspnea or chest pain    Objective:  /87 (BP Location: Right arm)   Pulse 79   Temp 98.2 °F (36.8 °C) (Oral)   Resp 18   Ht 5' 9\" (1.753 m)   Wt 172 lb 4.8 oz (78.2 kg)   SpO2 93% atorvastatin  40 mg Oral Daily   • brimonidine Tartrate  1 drop Both Eyes TID   • clopidogrel  75 mg Oral Daily   • Darolutamide  600 mg Oral BID   • famoTIDine  20 mg Oral Daily   • Dorzolamide HCl  1 drop Both Eyes BID   • metoprolol tartrate  100 mg Ora

## 2021-08-22 NOTE — H&P (VIEW-ONLY)
[facility]   Gastroenterology Consultation Note    Rodney Alvarez  Patient Status:    Inpatient  Date of Admission:         8/20/2021, Hospital day #2  Attending:   Azar Vanessa MD  PCP:     Shannan Gerber MD    Chief Comp adjacent mild superior endplate compression fracture is unchanged.  A new 5.5 cm hypodense mass is identified in the right hepatic lobe, likely representing a metastasis.     3.  There has been development of perirectal fat stranding, which is nonspecific a Date   • ANGIOPLASTY (CORONARY)  2004    and stent of LCx   • CABG  09/2020    Triple bypass   • CARD ECHO STRESS ECHO/REST AND STRESS(CPT=93350/61796 DM 70464)  2011    per NG: normal stress echo, (CAD)   • COLONOSCOPY  2013   • FRACTURE SURGERY Right tab 100 mg, 100 mg, Oral, BID    Review of Systems:  CONSTITUTIONAL:  negative for fevers, rigors.  +FATIGUE  EYES:  negative for diplopia   RESPIRATORY:  negative for severe shortness of breath  CARDIOVASCULAR:  negative for crushing sub-sternal chest pain 1. Postprocedural edema in the left groin from the recent vascular intervention. No drainable fluid collection in the left groin or retroperitoneum to suggest hematoma.  2. There is a history of prostate cancer status post remote brachytherapy and external Improved with transfusion. We will need to evaluate GI blood loss with an upper endoscopy and colonoscopy. I discussed this with the patient agrees to proceed.   Last colonoscopy was in 2013, where he had a polyp removed and was found to have diverticulos

## 2021-08-22 NOTE — PLAN OF CARE
Problem: Patient Centered Care  Goal: Patient preferences are identified and integrated in the patient's plan of care  Description: Interventions:  - What would you like us to know as we care for you?  - Provide timely, complete, and accurate information stability  - Monitor arterial and/or venous puncture sites for bleeding and/or hematoma  - Assess quality of pulses, skin color and temperature  - Assess for signs of decreased coronary artery perfusion - ex.  Angina  - Evaluate fluid balance, assess for ed ordered  - Monitor response to interventions for patient's volume status, including labs, urine output, blood pressure (other measures as available)  - Encourage oral intake as appropriate  - Instruct patient on fluid and nutrition restrictions as appropri

## 2021-08-22 NOTE — PROGRESS NOTES
[facility]    Progress Note    Lexis Garcia Patient Status:  Inpatient    1950 MRN [id number]   Location [facility] 3W/SW Attending Stefan Wise MD   Hosp Day # 2 PCP Ronad Layton MD     HPI/Subjective: height 1.753 m (5' 9\"), weight 78.2 kg (172 lb 4.8 oz), SpO2 93 %.    Wt Readings from Last 6 Encounters:  08/22/21 : 78.2 kg (172 lb 4.8 oz)  08/13/21 : 85.3 kg (188 lb)  07/30/21 : 85.3 kg (188 lb)  07/23/21 : 85.3 kg (188 lb)  07/22/21 : 85.3 kg (188 lb B12 399 04/28/2021       CT ABDOMEN+PELVIS(CPT=74176)    Result Date: 8/20/2021  CONCLUSION:  1. Postprocedural edema in the left groin from the recent vascular intervention.   No drainable fluid collection in the left groin or retroperitoneum to suggest he Lead    Result Date: 8/20/2021  ECG Report  Interpretation  -------------------------- Sinus Rhythm - Negative T-waves -Possible Anterolateral ischemia.  ABNORMAL When compared with ECG of 08/09/2021 08:07:41 No change Electronically signed on 08/20/2021 at

## 2021-08-23 NOTE — PLAN OF CARE
Patient is a/ox4. RA. Up with assist x1-2 with the walker. Accucheck ACHS. IVF @ 100ml/hr. EGD/colonoscopy today; see results. Tylenol for pain PRN. PT/OT eval tomorrow. Will continue to monitor.     Problem: Patient Centered Care  Goal: Patient preferences Monitor vital signs, rhythm, and trends  - Monitor for bleeding, hypotension and signs of decreased cardiac output  - Evaluate effectiveness of vasoactive medications to optimize hemodynamic stability  - Monitor arterial and/or venous puncture sites for bl assess for signs and symptoms of volume excess or deficit  - Monitor intake, output and patient weight  - Monitor urine specific gravity, serum osmolarity and serum sodium as indicated or ordered  - Monitor response to interventions for patient's volume st

## 2021-08-23 NOTE — OPERATIVE REPORT
ESOPHAGOGASTRODUODENOSCOPY (EGD) & COLONOSCOPY REPORT    Mikie Rod     1950 Age 65year old   PCP Mitesh Ramos MD Endoscopist Maria L Evans MD     Date of procedure: 21    Procedure: EGD w/biopsies & Colonoscopy w/snare polypectomy, b tolerated the procedure well. Complications: None    EGD findings:      1. Esophagus: The squamocolumnar junction was noted at 43 cm. The diaphragmatic pinch was noted at 43 cm from the incisors.  There was LA grade C erosive esophagitis with ulceration and retrieved. There was persistent oozing post polypectomy and thus one hemoclip placed with no further bleeding at the end of maneuver (increased risk of bleeding because on obligatory antiplatelet therapy).    2. Diverticulosis: single diverticulum in th

## 2021-08-23 NOTE — INTERVAL H&P NOTE
Pre-op Diagnosis: ANEMIA    The above referenced H&P was reviewed by Jayy West MD on 8/23/2021, the patient was examined and no significant changes have occurred in the patient's condition since the H&P was performed.   I discussed with the patient and

## 2021-08-23 NOTE — ANESTHESIA PREPROCEDURE EVALUATION
Anesthesia PreOp Note    HPI:     Shivani Dias is a 65year old male who presents for preoperative consultation requested by:  Abiodun Hewitt MD    Date of Surgery: 8/20/2021 - 8/23/2021    Procedure(s):  ESOPHAGOGASTRODUODENOSCOPY (EGD)  COLONOSCOPY Rising PSA following treatment for malignant neoplasm of prostate         Date Noted: 04/30/2015      Essential hypertension         Date Noted: 10/12/2012      Other hyperlipidemia         Date Noted: 10/12/2012      Gout         Date Noted: 04/04/2011 (LUPRON) depot injection 45 mg, 45 mg, Subcutaneous, Q6 Months, Carol Cárdenas MD, 45 mg at 04/07/21 1278    cyclobenzaprine 10 MG Oral Tab, Take 10 mg by mouth 3 (three) times daily as needed for Muscle spasms. , Disp: , Rfl:   aspirin 81 MG Oral Tab EC strip, Rfl: 0  Lancets (Rivka Villegas) Does not apply Misc, 1 lancet by Finger stick route daily. , Disp: 300 each, Rfl: 5  Blood Glucose Monitoring Suppl Does not apply Device, Check Blood sugar daily E11.9  Dispense per INS coverage, Disp Father         TIA   • Other (Lung cancer) Brother          2019     Social History    Socioeconomic History      Marital status: Single      Spouse name: Not on file      Number of children: Not on file      Years of education: Not on file      [employer] Meetings:       Marital Status:   Intimate Partner Violence:       Fear of Current or Ex-Partner:       Emotionally Abused:       Physically Abused:       Sexually Abused:     Available pre-op labs reviewed.   Lab Results   Component Value Date mellitus type 2 well controlled, arthritis  Abdominal  - normal exam               Anesthesia Plan:   ASA:  4  Plan:   MAC  Informed Consent Plan and Risks Discussed With:  Patient      I have informed Rubi Lim and/or legal guardian or family mem

## 2021-08-23 NOTE — PROGRESS NOTES
[facility] - [facility]  Cardiology Progress Note    Dalila Vance Patient Status:  Inpatient    1950 MRN [id number]   Location [facility] 3W/SW Attending Sonia Rodríguez MD   Hosp Day # 3 PCP MD Brunilda Mason 3.9 08/22/2021     08/22/2021    CO2 23.0 08/22/2021     (H) 08/22/2021    CA 9.0 08/22/2021    ALB 2.4 (L) 08/20/2021    ALKPHO 692 (H) 08/20/2021    BILT 0.5 08/21/2021    TP 6.1 (L) 08/20/2021    AST 68 (H) 08/20/2021    ALT 21 08/20/2021 There is evidence of disease progression including substantial worsening of diffuse osteoblastic metastases.   The lytic metastasis previously seen in the T10 vertebral body has slightly decreased in conspicuity and the adjacent mild superior endplate compr ------------------------------------------------------------------- Ordering MD: Vish Jennings  Interpreting physician:       Vish Jennings Sonographer: Tyler Sesay  CC: Vish Jennings CC: Etelvina Byrne  --------------------------------------------------------- status: Inpatient. Study date:  Study date: 08/21/2021. Study time: 03:00 PM.  Location:  Echo laboratory. ------------------------------------------------------------------- Findings Left ventricle:   The cavity size was normal. Wall thickness was normal. Value        09/10/2020 Reference  LV ID, ED, PLAX                  5.5   cm     4.8        4.2 - 5.8  LV ID, ES, PLAX          (H)     4.6   cm     3.4        2.5 - 4.0  LV fx shortening, PLAX   (L)     16    %      29         25 - 43  LV ID, major axis, ES           (H)     5.9   cm     3.8        3.0 - 4.0  LA volume, ES, 2-p               115   ml     41         ---------  LA volume/bsa, ES, 2-p   (H)     55    ml/m^2 18         16 - 34  LA ID, A-P, ES, MM       (H)     5.6   cm     ---------- 3.0 - 4. 0 dry    Assessment and Plan:   Anemia  - Hgb down to 5.6 on admission, s/p PRBC now ~ 10  - No RP bleed on CT, no obvious source of bleeding  - Plan for endoscopy/colonoscopy today  - Continue ASA 81 mg daily, plavix 75 mg daily; recent PCI on 8/16/2021  -

## 2021-08-23 NOTE — PROGRESS NOTES
[facility] - [facility]    Progress Note    Ronnie Kin Patient Status:  Inpatient    1950 MRN [id number]   Location [facility] 3W/SW Attending Marbella Lucas MD   Hosp Day # 3 PCP Clemente Fall MD       Subjective:     Fee artery disease/CABG'Sep 2020 with recent PCI intervention   + Troponin slightly elevated  No chest pain  Pt was seen by cardiology  -Telemetry  -Lopressor  -ECHO normal systolic functions, EF 79%, no regional wall motion abnormalities, there is grade 3 soumya

## 2021-08-23 NOTE — PROGRESS NOTES
[facility] HOSP - [facility]    Hematology/Oncology   Progress Note    Terri Jeffries Patient Status:  Inpatient    1950 MRN [id number]   Location [facility] 3W/SW Attending Yuliya Hurst MD   Hosp Day # 3 PCP Shayne Bamberger, MD circumferential wall thickening in the  mid to superior rectum that is suboptimally assessed by CT technique, these findings could also relate to edema in the setting of focal proctitis, although an underlying postradiation stricture or potentially a recta

## 2021-08-24 NOTE — PHYSICAL THERAPY NOTE
PHYSICAL THERAPY EVALUATION - INPATIENT     Room Number: 634/782-V  Evaluation Date: 8/24/2021  Type of Evaluation: Initial   Physician Order: PT Eval and Treat    Presenting Problem: anemia  Reason for Therapy: Mobility Dysfunction and Discharge Planning benefit from continued skilled physical therapy while in the hospital and upon D/C from the hospital with home health and 24hr supervision.  The patient's Approx Degree of Impairment: 46.58% has been calculated based on documentation in the [facility] '6 clicks' Urinary retention    • Visual impairment     contacts     Past Surgical History  Past Surgical History:   Procedure Laterality Date   • ANGIOPLASTY (CORONARY)  2004    and stent of LCx   • CABG  09/2020    Triple bypass   • CARD ECHO STRESS ECHO/REST AND S adjusting bedclothes, sheets and blankets)?: A Little   -   Sitting down on and standing up from a chair with arms (e.g., wheelchair, bedside commode, etc.): A Little   -   Moving from lying on back to sitting on the side of the bed?: A Little   How much h

## 2021-08-24 NOTE — CM/SW NOTE
08/24/21 1200   CM/SW Referral Data   Referral Source Social Work (self-referral)   Reason for Referral Discharge planning   Informant Patient   Pertinent Medical Hx   Does patient have an established PCP?  Yes  (Dr. Darcy Joyce)   Patient Info   Latia

## 2021-08-24 NOTE — PROGRESS NOTES
[facility] HOSP - [facility]  Cardiology Progress Note    Hamilton Allen Patient Status:  Inpatient    1950 MRN [id number]   Location [facility] 3W/SW Attending Monserrat Zavala MD   Hosp Day # 4 PCP MD Cathi Hilliard Darolutamide  600 mg Oral BID   • famotidine  20 mg Oral Daily   • Dorzolamide HCl  1 drop Both Eyes BID   • metoprolol tartrate  100 mg Oral BID     Continuous Infusions:   • lactated ringers 100 mL/hr at 08/24/21 0151     Results:     Lab Results   Lawtey --  90.0*   < > 102.0* 75.0* 75.0*    < > = values in this interval not displayed. No results for input(s): BNPML in the last 168 hours. Recent Labs   Lab 08/20/21  1612 08/20/21  1956   TROP 0.731* 0.555*       No results found.       CARD ECHO 2D hypertension. No significant change since prior study. ------------------------------------------------------------------- History:   PMH:   Coronary artery disease. Risk factors: Hypertension. Diabetes mellitus.  Dyslipidemia. ---------------------------- 4mm Hg. Left atrium:  The atrium was severely dilated. Right ventricle: The cavity size was normal. Systolic function was normal. Pulmonic valve: The valve appears to be grossly normal. Doppler: There was no evidence for stenosis.     Mild regurgitati 7.07  cm/sec 5.98       ---------  LV E/e', medial                  13.8         7.6        ---------  LV e', average                   11.1  cm/sec 6.5        ---------  LV E/e', average                 9            7          ---------   Ventricular s Value        09/10/2020 Reference  RA area, ES, A4C         (H)     23    cm^2   ---------- 10 - 18   Systemic veins                   Value        09/10/2020 Reference  Estimated CVP                    15    mm Hg  8          ---------   Teofilo contact us with any questions.     Gudelia Lazcano MD  [address] [place] [address]  8/24/2021

## 2021-08-24 NOTE — PROGRESS NOTES
[facility]    Hematology/Oncology   Progress Note    Terri Jeffries Patient Status:  Inpatient    1950 MRN [id number]   Location [facility] 3W/SW Attending Yuliya Hurst MD   Hosp Day # 4 PCP Shayne Bamberger, MD anemia, hemolysis no evidence of active bleeding no retroperitoneal blood enoscopies complete.  hgb stable    Transfuse for hemoglobin goal of 7 monitor closely    Okay for discharge plan for chemo Friday outpatient if possible.        Olivia Loredo MD

## 2021-08-24 NOTE — PLAN OF CARE
Patient alert and oriented x4. Saturating well in RA. Magnesium, potassium and phosphate were replaced today. Tylenol given for pain with relief. Seen by PT today, recommending home with [facility]. Venous doppler done today.   Educated to use the call light, CARDIOVASCULAR - ADULT  Goal: Maintains optimal cardiac output and hemodynamic stability  Description: INTERVENTIONS:  - Monitor vital signs, rhythm, and trends  - Monitor for bleeding, hypotension and signs of decreased cardiac output  - Evaluate effectiv Progressing  Goal: Hemodynamic stability and optimal renal function maintained  Description: INTERVENTIONS:  - Monitor labs and assess for signs and symptoms of volume excess or deficit  - Monitor intake, output and patient weight  - Monitor urine specific

## 2021-08-24 NOTE — PROGRESS NOTES
[facility]    Progress Note    Liban Pineda Patient Status:  Inpatient    1950 MRN [id number]   Location [facility] 3W/SW Attending Meredith Auguste MD   Hosp Day # 4 PCP Lili Marrero MD       Subjective:     Fee endoscopy 8/23   · LA grade C erosive esophagitis with ulceration, non-bleeding  · Gastric erythema with erosions, biopsied. · Duodenal erythema, biopsied.    · Total of 6 sub-centimeter polyps, removed with combination of cold snare and cold forceps polyp 08/20/2021    AST 68 (H) 08/20/2021    ALT 21 08/20/2021    PTT 42.2 (H) 08/21/2021    INR 1.33 (H) 08/21/2021    PT 12.2 11/24/2015    TSH 1.350 04/28/2021    .00 (H) 08/24/2021    DDIMER 5.28 (H) 08/21/2021    ESRML 11 11/23/2020    MG 1.5 (L) 08/

## 2021-08-24 NOTE — PROGRESS NOTES
[facility]     Gastroenterology Progress Note    Juan Diego Aguilar Patient Status:  Inpatient    1950 MRN [id number]   Location [facility] 3W/SW Attending Tisha Bledsoe MD   Hosp Day # 4 PCP Rod Ryenaga MD YONIS or Dr. Radha Burris   -patient requested OP reports from EGD/CLN - printed for him today   -see above    Case d/w Dr. Rufina Nice and RN. OK to discharge from GI perspective once medically cleared. GI to sign off. Please call/page with questions.      Kranthi Noble,

## 2021-08-25 NOTE — OCCUPATIONAL THERAPY NOTE
OCCUPATIONAL THERAPY EVALUATION - INPATIENT     Room Number: 322/322-A  Evaluation Date: 8/25/2021  Type of Evaluation: Initial  Presenting Problem:  (anemia)    Physician Order: IP Consult to Occupational Therapy  Reason for Therapy: ADL/IADL Dysfunction RECOMMENDATIONS  OT Discharge Recommendations: 24 hour care/supervision;Home with home health PT/OT  OT Device Recommendations: Raised toilet seat    PLAN  OT Treatment Plan: Balance activities; Energy conservation/work simplification techniques; Functional MD;  Location: [facility] ENDOSCOPY   • FRACTURE SURGERY Right     plate   • HEAD SURGERY PROC UNLISTED      per NG: Benign mass remove in skull thru the nasal passage   • INGUINAL HERNIA REPAIR Right 1961   • KNEE SURGERY Left     Arthroscopy   • ORCHIECTOMY   R 20  Approx Degree of Impairment: 38.32%  Standardized Score (AM-PAC Scale): 42.03  CMS Modifier (G-Code): CJ    FUNCTIONAL TRANSFER ASSESSMENT  Supine to Sit : Supervision (increased effort)  Sit to Stand: Contact guard assist  Toilet Transfer: NT - educat

## 2021-08-25 NOTE — PROGRESS NOTES
[facility]     Gastroenterology Progress Note    Mahendra Kern Patient Status:  Inpatient    1950 MRN [id number]   Location [facility] 3W/SW Attending Martha Walsh MD   Hosp Day # 5 PCP Shant Mendoza MD today. #recent stent placement, h/o CABG: on Plavix     #metastatic prostate cancer: per oncology - noted to have chemotherapy set up for 8/27.     Recommend:  -OK to continue obligatory DAPT  -PPI 40 mg PO BID   -outpatient f/u with Kayla Isabel APR

## 2021-08-25 NOTE — DISCHARGE PLANNING
Patient was provided with discharge instructions, education, and follow up information. Printed prescription for famotidine was given to patient at his request. Prescription for pantoprazole was already sent electronically to patient's pharmacy.  Patient ve

## 2021-08-25 NOTE — TELEPHONE ENCOUNTER
We have had pts on both before, but can review with Dr. Filipe Cordova if he would like to change to different antiplatelet

## 2021-08-25 NOTE — CM/SW NOTE
10: 40AM  Received update from Tangela Reyes w/ [facility] - pt has been accepted. Per Siobhan, pt and sister will discuss decision for [facility] services and update her at approx 12:30PM.    Tangela Reyes will update SW on pt's final decision once confirmed.     Pt has DC order for today 8/2

## 2021-08-25 NOTE — HOME CARE LIAISON
10:30AM: Spoke w/ pt and pt's sister in regards to [facility] and provided choice via [facility] list. Pt has hx of [facility] from 10/2021. Pt and sister would like to discuss prior to making decision. Per sister, will call liaison at number provided to notify of answer.  No

## 2021-08-25 NOTE — CM/SW NOTE
BPCI-Advanced Medicare Program Note:  Plan of care reviewed for care coordination and discharge planning. Noted patient falls under  BPCI/Medicare program, with DRG pci  GAB tool was used to help determine next care setting.  Thus, 64 Angelique Harris recommendations is

## 2021-08-25 NOTE — DISCHARGE SUMMARY
[facility] HOSPITALIST  DISCHARGE SUMMARY     Rubi Lim Patient Status:  Inpatient    1950 MRN [id number]   Location [facility] 3W/SW Attending Justin Stoddard MD   Hosp Day # 5 PCP Marcell Julian MD     DATE OF ADMISSION: 2021 [98.3 °F (36.8 °C)-98.6 °F (37 °C)] 98.5 °F (36.9 °C)  Pulse:  [89-96] 89  Resp:  [16-20] 16  BP: (109-144)/() 144/75  Gen: A+Ox3. No distress. HEENT: NCAT, neck supple, no carotid bruit. CV: RRR, S1S2, and intact distal pulses.  No gallop, rub, m daily.   Quantity: 30 tablet  Refills: 0     atorvastatin 40 MG Tabs  Commonly known as: LIPITOR      TAKE ONE TABLET BY MOUTH ONE TIME DAILY   Quantity: 90 tablet  Refills: 1     Blood Glucose Monitoring Suppl Georgina      Check Blood sugar daily E11.9  Disp · pantoprazole 40 MG Tbec     Please  your prescriptions at the location directed by your doctor or nurse    Bring a paper prescription for each of these medications  · famotidine 20 MG Tabs         CONSULTANTS  Consultants  Chat With All Active M

## 2021-08-25 NOTE — PLAN OF CARE
Danish Barba is A&O x4 and on R. A. Patient is 1 assist with walker. Saline locked. Hx of prostate cancer with mets and is weaker. Patient is planning to go home today with [facility] if Hemoglobin is good. Patient concerned about sister taking care of him once home.  C additional Care Plan goals for specific interventions  Outcome: Progressing     Problem: CARDIOVASCULAR - ADULT  Goal: Maintains optimal cardiac output and hemodynamic stability  Description: INTERVENTIONS:  - Monitor vital signs, rhythm, and trends  - Mon ordered  - Instruct patient on fluid and nutrition restrictions as appropriate  Outcome: Progressing  Goal: Hemodynamic stability and optimal renal function maintained  Description: INTERVENTIONS:  - Monitor labs and assess for signs and symptoms of volume

## 2021-08-25 NOTE — TELEPHONE ENCOUNTER
S/w pharmacy that patient should be on med. Interaction of Protonix BID is to decrease effectiveness of plavix.  Will review with MD for any needed changes,   SHERRIE placed 8/16/2021

## 2021-08-25 NOTE — TELEPHONE ENCOUNTER
Pharmacy called to find out if pt should be taking pantoprazole and clopidogrel together. Pharmacy states Jody prescribed clopidogrel and patient was on it when he left the hospital.  Please call.

## 2021-08-25 NOTE — PLAN OF CARE
PRN Tylenol given for pain management. Ambulates with 1 and a walker. Pt slept throughout the night. Fall precautions in place, call light within reach. Plan- discharge home with [facility] once medically cleared.        Problem: Patient Centered Care  Goal: Latia hemodynamic stability  Description: INTERVENTIONS:  - Monitor vital signs, rhythm, and trends  - Monitor for bleeding, hypotension and signs of decreased cardiac output  - Evaluate effectiveness of vasoactive medications to optimize hemodynamic stability maintained  Description: INTERVENTIONS:  - Monitor labs and assess for signs and symptoms of volume excess or deficit  - Monitor intake, output and patient weight  - Monitor urine specific gravity, serum osmolarity and serum sodium as indicated or ordered

## 2021-08-25 NOTE — PROGRESS NOTES
[facility] HOSP - [facility]    Hematology/Oncology   Progress Note    Rubi Lim Patient Status:  Inpatient    1950 MRN [id number]   Location [facility] 3W/SW Attending Charity Westbrook MD   Hosp Day # 5 PCP Marcell Julian MD start this week   –Continue ADT  –Denosumab for bone mets and hypercalcemia of an outpatient no issues currently   –Will obtain genomics from previous prostate sample        Now hospitalized:  Severe anemia, hemolysis no evidence of active bleeding no retr

## 2021-08-26 NOTE — PROGRESS NOTES
Radiation Oncology Treatment Summary    Diagnosis: metastatic prostate cancer    Site: T9-T11    Dose: 2000 cGy in 5 fractions    Treatment Start Date: 6/30/21    Treatment End Date: 7/7/21    Elapsed Days: 7     Concurrent Treatments: none    Radiation Th

## 2021-08-27 PROBLEM — I50.9 ACUTE HEART FAILURE (HCC): Status: ACTIVE | Noted: 2021-01-01

## 2021-08-27 PROBLEM — L08.9: Status: ACTIVE | Noted: 2021-01-01

## 2021-08-27 PROBLEM — S90.414A: Status: ACTIVE | Noted: 2021-01-01

## 2021-08-27 PROBLEM — E83.51 HYPOCALCEMIA: Status: ACTIVE | Noted: 2021-01-01

## 2021-08-27 PROBLEM — R53.1 WEAKNESS GENERALIZED: Status: ACTIVE | Noted: 2021-01-01

## 2021-08-27 PROBLEM — I50.23 ACUTE ON CHRONIC SYSTOLIC CONGESTIVE HEART FAILURE (HCC): Status: ACTIVE | Noted: 2021-01-01

## 2021-08-27 NOTE — ED QUICK NOTES
Orders for admission, patient is aware of plan and ready to go upstairs. Any questions, please call ED RN Jackqulyn Gitelman  at extension [phone].      Type of COVID test sent: rapid - pending  COVID Suspicion level: low, vaccinated  Drug(s) infusing: n/a  LOC at time

## 2021-08-27 NOTE — CM/SW NOTE
Per ERMD, this patient will need placement post discharge from hospital.    Social Work order entered for post discharge planning. Shana Dewitt called at [phone] and message left for DON screening.

## 2021-08-27 NOTE — TELEPHONE ENCOUNTER
Jacque Watson at [phone] sister states that the patient is so weak that he can not get out of bed and she feels that he will fall  If he get up. He has new Chemo today. Dr Chantal Foster pt.

## 2021-08-27 NOTE — TELEPHONE ENCOUNTER
Patient of Dr Kiran Hameed with metastatic prostate cancer due to start C1 D1 Docetaxel at 10:30 am today. I spoke with Wing Pérez and Jacky his sister. Wing Pérez reports that he just got out of the hospital on Wednesday and feels too weak & tired to come for treatment today.

## 2021-08-27 NOTE — ED INITIAL ASSESSMENT (HPI)
Pt here via [facility] for 3 days of numbness to bilat feet w/ swelling noted to BLEs for same amount of time, causing trouble walking per pt. Reports hx CHF. Also reports month of tremors to bilat hands.  EMS also noted avulsed toenail to R 2nd toe which

## 2021-08-27 NOTE — PROGRESS NOTES
[facility] Dosing Service    Initial Pharmacokinetic Consult for Vancomycin AUC Dosing    Sukhdev Adam is a 65year old patient who is being treated for cellulitis. Pharmacy has been asked to dose vancomycin by Dr Escobar Mcrae.     Weights:  Ideal body

## 2021-08-27 NOTE — ED PROVIDER NOTES
Patient Seen in: [facility] AND [facility] Emergency Department      History   Patient presents with:  Swelling Edema    Stated Complaint:     HPI/Subjective:   HPI    Patient is a 57-year-old male who arrives by EMS from home with generalized weakness and inabi for the following components:       Result Value    Glucose 151 (*)     CO2 18.0 (*)     Calcium, Total 6.6 (*)     Calculated Osmolality 296 (*)     All other components within normal limits   PRO BETA NATRIURETIC PEPTIDE - Abnormal; Notable for the follo available prior medical records for any recent pertinent discharge summaries, testing, and procedures and reviewed those reports. Critical Care:   I spent a total of 60 minutes of critical care time in obtaining history, performing a physical exam, bedsi

## 2021-08-27 NOTE — H&P
[facility]    PATIENT'S NAME: Kehinde Casillas   ATTENDING PHYSICIAN: Joe Calles MD   PATIENT ACCOUNT#:   [id number]    LOCATION:  [address] [place] [address] RECORD #:   [id number]       YOB: 1950  ADMISSION DATE:       08/27/2 thrombocytopenia of unclear etiology. It could be related to bone marrow suppression. No clear evidence of gastrointestinal bleed.      PAST SURGICAL HISTORY:  He had an upper endoscopy and colonoscopy recently which showed metastatic disease on one of th Supple. No lymphadenopathy. Mild jugular venous distention. LUNGS:  Diminished breath sounds in both lung bases. HEART:  Regular rate and rhythm. S1, S2 auscultated. ABDOMEN:  Soft, nondistended. No tenderness. Positive bowel sounds.   EXTREMITIES

## 2021-08-28 NOTE — PLAN OF CARE
Problem: Patient Centered Care  Goal: Patient preferences are identified and integrated in the patient's plan of care  Description: Interventions:  - What would you like us to know as we care for you?  I fell two times today (8/27)  - Provide timely, comp interventions  Outcome: Not Progressing  Goal: Patient/Family Short Term Goal  Description: Patient's Short Term Goal: no more numbness or tremors    Interventions:   - PT/OT evals  -correct electrolytes  -podiatry consult  -cardiology consult  -oncology c

## 2021-08-28 NOTE — CONSULTS
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     Negative nuclear chemical stress test of the [facility] [place]) Oct 2014   • CAD (coronary artery disease)     x2; chemical stress test normal, done in [facility] Oct 2014   • Cancer [facility]) 2015   • Coronary atherosclerosis    • Diabetes (HC on file      Years of education: Not on file      Highest education level: Not on file    Tobacco Use      Smoking status: Never Smoker      Smokeless tobacco: Never Used    Vaping Use      Vaping Use: Never used    Substance and Sexual Activity      Alcoh HCl (VERSED) 2 MG/2ML injection, , , ,   [COMPLETED] lidocaine (PF) (XYLOCAINE) 2 % injection, , , ,   [COMPLETED] Heparin (Porcine) in NaCl 2 UNITS/ML premix flush, , , ,   [COMPLETED] Heparin (Porcine) in NaCl 2 UNITS/ML premix flush, , , ,   [COMPLETED] 08/02/21 1325  [COMPLETED] regadenoson (LEXISCAN) 0.4 MG/5ML injection, , , , , 0.4 mg at 07/30/21 0947    amLODIPine 10 MG Oral Tab, Take 0.5 tablets (5 mg total) by mouth daily. , Disp: 90 tablet, Rfl: 3  acetaminophen 325 MG Oral Tab, Take 2 tablets (650 Place 1 drop into both eyes 2 (two) times a day.  , Disp: , Rfl:   [] cyclobenzaprine 10 MG Oral Tab, Take 1 tablet (10 mg total) by mouth 3 (three) times daily as needed for Muscle spasms. , Disp: 30 tablet, Rfl: 1  Glucose Blood (ONETOUCH ULTRA) In  (H) 08/28/2021    ALT 38 08/28/2021    ALKPHOS 78 03/03/2016    BILT 0.5 08/28/2021    TP 5.6 (L) 08/28/2021    ALB 2.2 (L) 08/28/2021    GLOBULIN 3.4 08/28/2021    AGRATIO 1.5 03/03/2016     08/28/2021    K 3.4 (L) 08/28/2021     (H

## 2021-08-28 NOTE — CONSULTS
[facility] AND [facility]  Cardiology Consultation    Tamica Monteiro Patient Status:  Observation    1950 MRN [id number]   Location [facility] 3W/SW Attending Katie Delarosa MD   Hosp Day # 0 PCP David Gerber MD     [facility] SERVICES, [facility] ([facility]) SURGERY Left     Arthroscopy   • ORCHIECTOMY   Right 1968    for undescended testicle   • OTHER      cystoscopy   • TRANSURETHRAL ELEC-SURG PROSTATECTOM  05/21/2021    Channeling bipolar TURP.  Dr. Chevy Reeves @ [facility]     Family History   Problem Relation Age of carbonate-vitamin D 250-125 MG-UNIT per tab TABS 2 tablet, 2 tablet, Oral, TID CC  •  aspirin EC tab 81 mg, 81 mg, Oral, Daily  •  brimonidine Tartrate (ALPHAGAN) 0.2 % ophthalmic solution 1 drop, 1 drop, Both Eyes, TID  •  clopidogrel (PLAVIX) tab 75 mg, CREATSERUM 0.94 08/27/2021    BUN 17 08/27/2021     08/27/2021    K 4.1 08/27/2021     08/27/2021    CO2 18.0 08/27/2021     08/27/2021    CA 6.6 08/27/2021    ALB 2.3 08/27/2021    ALKPHO 1,309 08/27/2021    BILT 0.5 08/27/2021    TP 5.

## 2021-08-28 NOTE — PROGRESS NOTES
Non-Formulary medication - Patient Supplied  Name of non formulary med: darolutamide  Floor staff aware this is med needs to be brought in from home?: Yes    Name of RN spoken with: Noah     Notes/Details/Pertinent issues:  Pt aware needs to bring in.

## 2021-08-28 NOTE — OCCUPATIONAL THERAPY NOTE
OCCUPATIONAL THERAPY EVALUATION - INPATIENT     Room Number: 324/324-A  Evaluation Date: 8/28/2021  Type of Evaluation: Initial  Presenting Problem:  (hypocalcemia)    Physician Order: IP Consult to Occupational Therapy  Reason for Therapy: ADL/IADL Dysfun techniques;ADL training;Functional transfer training; Endurance training;Patient/Family education;Patient/Family training;Equipment eval/education; Compensatory technique education       OCCUPATIONAL THERAPY MEDICAL/SOCIAL HISTORY     Problem List  Principal 1961   • KNEE SURGERY Left     Arthroscopy   • ORCHIECTOMY   Right 1968    for undescended testicle   • OTHER      cystoscopy   • TRANSURETHRAL ELEC-SURG PROSTATECTOM  05/21/2021    Channeling bipolar TURP.  Dr. Delfino Johnson @ [facility] 19  Type of (G-Code): CK    FUNCTIONAL TRANSFER ASSESSMENT  Supine to Sit :  (mod a x2)  Sit to Stand: Not tested  Toilet Transfer: NT  Shower Transfer: NT  Chair Transfer: NT    Bedroom Mobility: NT    FUNCTIONAL ADL ASSESSMENT  Grooming: SET UP   Feeding: INDEP  [facility]

## 2021-08-28 NOTE — CONSULTS
[facility]    Report of Consultation    Dalila Vance Patient Status:  Observation    1950 MRN [id number]   Location [facility] 3W/SW Attending Scott Lamar MD   Hosp Day # 0 PCP María Sierra MD     Date of Adm Kerry Castro MD;  Location: [address] [place] [address] ENDOSCOPY   • FRACTURE SURGERY Right     plate   • HEAD SURGERY PROC UNLISTED      per NG: Benign mass remove in skull thru the nasal passage   • INGUINAL HERNIA REPAIR Right 1961   • KNEE SURGERY Left     Arthroscopy   • ORCHIECTO injection 10 mL, 10 mL, Intravenous, PRN  •  furosemide (LASIX) injection 20 mg, 20 mg, Intravenous, BID (Diuretic)  •  vancomycin IVPB premix 1.25g in 0.9% NaCl 250 mL, 15 mg/kg, Intravenous, Q24H  •  calcium carbonate-vitamin D 250-125 MG-UNIT per tab TA which can be correlated clinically with physical examination. No radiographic evidence of osteomyelitis. 2. Chronic healed lateral malleolar fracture status post ORIF. Mild osteoarthritis involving the tibiotalar joint.  3. Pes planus with moderate to sev underlying short segment circumferential wall thickening in the  mid to superior rectum that is suboptimally assessed by CT technique, these findings could also relate to edema in the setting of focal proctitis, although an underlying postradiation strictu right-sided thoracic back pain     Chronic midline low back pain without sciatica     Bone metastases (HCC)     Thrombocytopenia (HCC)     Azotemia     Hyperglycemia     Anemia     Anemia, unspecified type     Elevated troponin     Prostate cancer ([facility] [address]

## 2021-08-28 NOTE — CM/SW NOTE
08/28/21 1400   CM/SW Referral Data   Referral Source Physician   Reason for Referral Discharge planning   Informant EMR   Readmission Assessment   Pt's living situation prior to admission?  Home alone  (staying w/sister)   Pt's level of independence at

## 2021-08-28 NOTE — PHYSICAL THERAPY NOTE
PHYSICAL THERAPY EVALUATION - INPATIENT     Room Number: 324/324-A  Evaluation Date: 8/28/2021  Type of Evaluation: Initial   Physician Order: See Comment for Specific Order (functional mobility screen)    Presenting Problem: BLE edema, weakness, falls  R Impairment: 72.57% has been calculated based on documentation in the [facility] '6 clicks' Inpatient Basic Mobility Short Form. Research supports that patients with this level of impairment may benefit from rehab stay.  Prior to hospital admission last week, pt Prostate cancer [facility]) May 2015    Treated with hormonal injection Trelstar x 1 in May 2015.  Then external beam radiation in Sept 2015; and brachytherapy in Dec 2015   • Rheumatoid arthritis ([facility] [address]     bilateral knees   • Urinary retention    • Visual impair Status:  WFL - within functional limits    RANGE OF MOTION AND STRENGTH ASSESSMENT  Upper extremity ROM and strength are within functional limits     Lower extremity ROM is within functional limits     Lower extremity strength is within functional limits set    CURRENT GOALS    Goals to be met by: 9/11/21  Patient Goal Patient's self-stated goal is: to be able to walk without falls   Goal #1 Patient is able to demonstrate supine - sit EOB @ level: minimum assistance     Goal #1   Current Status    Goal #2

## 2021-08-28 NOTE — PROGRESS NOTES
[facility]    Progress Note    Liban Pineda Patient Status:  Inpatient    1950 MRN [id number]   Location [facility] 3W/SW Attending Marti Mercer MD   Hosp Day # 1 PCP Lili Marrero MD       Subjective:   Elina glucose-vitamin C (DEX-4) chewable tab 4 tablet, 4 tablet, Oral, Q15 Min PRN **OR** dextrose 50 % injection 50 mL, 50 mL, Intravenous, Q15 Min PRN **OR** glucose (DEX4) oral liquid 30 g, 30 g, Oral, Q15 Min PRN **OR** glucose-vitamin C (DEX-4) chewable tab dysfunction. Cardiology consulted  Cont IV diuretics  Strict IsOs  Daily weights  Cardiac electrolyte replacement protocol    4. Underlying coronary artery disease. Cont ASA, plavix    5. Widely metastatic prostate cancer.   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 4. Moderate 1st MTP joint and hallux/sesamoid complex osteoarthritis. 5. Large plantar calcaneal spur.     Dictated by (CST): Hallie Pack MD

## 2021-08-29 PROBLEM — Z71.89 ADVANCE CARE PLANNING: Status: ACTIVE | Noted: 2021-01-01

## 2021-08-29 NOTE — PLAN OF CARE
Problem: Patient Centered Care  Goal: Patient preferences are identified and integrated in the patient's plan of care  Description: Interventions:  - What would you like us to know as we care for you?  I fell two times today (8/27)  - Provide timely, comp limits  Description: INTERVENTIONS:  - Monitor labs and rhythm and assess patient for signs and symptoms of electrolyte imbalances  - Administer electrolyte replacement as ordered  - Monitor response to electrolyte replacements, including rhythm and repeat

## 2021-08-29 NOTE — PROGRESS NOTES
Cardiology Progress Note    Juan Diego Aguilar Patient Status:  Inpatient    1950 MRN [id number]   Location [facility] 3W/SW Attending Rayna Penn MD   Hosp Day # 2 PCP Rod Reynaga MD       Subjective: Feeling better, denies any n 9843 08/24/21  1619 08/25/21  0719 08/27/21  1557 08/28/21  0808 08/29/21  0726     --  143  --   --  141 143 143   K 3.9   < > 3.3* 3.9  --  4.1 3.4* 3.5     --  111  --   --  111 113* 114*   CO2 23.0  --  22.0  --   --  18.0* 19.0* 20.0*   BU (NOVOLOG) 100 UNIT/ML flexpen 1-7 Units, 1-7 Units, Subcutaneous, TID CC  Atropine Sulfate 0.1 MG/ML injection 0.5 mg, 0.5 mg, Intravenous, PRN  nitroGLYCERIN (NITROSTAT) SL tab 0.4 mg, 0.4 mg, Sublingual, Q5 Min PRN  Normal Saline Flush 0.9 % injection 10

## 2021-08-29 NOTE — PROGRESS NOTES
[facility]    Hematology/Oncology   Progress Note    Dalila Vance Patient Status:  Inpatient    1950 MRN [id number]   Location [facility] 4W/SW/SE Attending Deion Da Silva MD   Hosp Day # 2 PCP María Sierra MD Dictated by (CST): Huong Ivey MD on 8/27/2021 at 8:16 PM     Finalized by (CST): Huong Ivey MD on 8/27/2021 at 8:20 PM          XR CHEST AP PORTABLE  (CPT=71045)    Result Date: 8/27/2021  CONCLUSION:  1.  Development of mild CHF/fluid overl

## 2021-08-29 NOTE — PROGRESS NOTES
[facility]  Hospitalist Progress Note     Janethkatey Anais Patient Status:  Inpatient    1950  65year old [facility] [id number]   Location 324/324-A Attending Max Wade MD   Hosp Day # 2 PCP Roger Mac MD     Assessment & Plan: with:  Swelling Edema    ----------------------------------  Temp:  [99.4 °F (37.4 °C)-100.6 °F (38.1 °C)] 99.6 °F (37.6 °C)  Pulse:  [] 100  Resp:  [20-22] 20  BP: (101-123)/(52-82) 104/82  Gen: A+Ox3. No distress.    HEENT: NCAT, neck supple, no ca times per day   • Insulin Aspart Pen  1-7 Units Subcutaneous TID CC   • calcium carbonate-vitamin D  2 tablet Oral TID CC   • aspirin  81 mg Oral Daily   • brimonidine Tartrate  1 drop Both Eyes TID   • clopidogrel  75 mg Oral Daily   • Dorzolamide HCl  1

## 2021-08-29 NOTE — PLAN OF CARE
Electrolytes replaced; IVABX; PT/OT; IV Lasix; call light in reach; bed alarm activated.     Problem: Patient Centered Care  Goal: Patient preferences are identified and integrated in the patient's plan of care  Description: Interventions:  - What would you limits  Description: INTERVENTIONS:  - Monitor labs and rhythm and assess patient for signs and symptoms of electrolyte imbalances  - Administer electrolyte replacement as ordered  - Monitor response to electrolyte replacements, including rhythm and repeat

## 2021-08-29 NOTE — CM/SW NOTE
NO accepting SNF in [place].   SW uploaded more facilities and added time to pt referral.    CHIN Fuentes, [facility]  Social Work   TCK:#[id number]

## 2021-08-30 PROBLEM — Z71.89 COUNSELING REGARDING ADVANCE CARE PLANNING AND GOALS OF CARE: Status: ACTIVE | Noted: 2021-01-01

## 2021-08-30 PROBLEM — Z51.5 PALLIATIVE CARE BY SPECIALIST: Status: ACTIVE | Noted: 2021-01-01

## 2021-08-30 NOTE — PROGRESS NOTES
[facility]OSP - [facility]    Hematology/Oncology   Progress Note    Liban Pineda Patient Status:  Inpatient    1950 MRN [id number]   Location [facility] 4W/SW/SE Attending Eduardo Daniels MD   Hosp Day # 3 PCP Lili Marrero MD Dictated by (CST): Alexandra Goodell, MD on 8/27/2021 at 8:16 PM     Finalized by (CST): Alexandra Goodell, MD on 8/27/2021 at 8:20 PM              Medications reviewed.     Assessment and Plan:  77-year-old male being evaluated by Medical Oncology for met

## 2021-08-30 NOTE — DIETARY NOTE
ADULT NUTRITION INITIAL ASSESSMENT    Pt is at moderate nutrition risk. Pt meets moderate malnutrition criteria.       CRITERIA FOR MALNUTRITION DIAGNOSIS:  Criteria for non-severe malnutrition diagnosis: acute illness/injury related to wt loss 7.5% in 3 m 08/30/21 1236  40 %             Food Allergies: No Known Food Allergies (NKFA)  Cultural/Ethnic/Nondenominational Preferences: Not Obtained    MEDICATIONS: reviewed    • potassium phosphate IVPB  15 mmol Intravenous Once    Followed by   • sodium phosphate  15 mmo 200 lbs       89% UBW    WEIGHT HISTORY:  Patient Weight(s) for the past 336 hrs:   Weight   08/29/21 0541 80.4 kg (177 lb 4.8 oz)   08/28/21 0614 76.2 kg (168 lb)   08/27/21 1551 77.1 kg (170 lb)     Wt Readings from Last 10 Encounters:  08/29/21 : 80.4 k setting or provider: monitor plans    MONITOR AND EVALUATE/NUTRITION GOALS:  - Food and Nutrient Intake:      Monitor: adequacy of PO intake and adequacy of supplement intake.   - Anthropometric Measurement:      Monitor weight  - Nutrition Goals:      halt

## 2021-08-30 NOTE — CARDIAC REHAB
[facility] REHAB CONSULT ORDER RECEIVED. UPON CHART REVIEW, ASSESSMENT IS THAT PT IS INAPPROPRIATE FOR CARDIAC REHAB AT THIS TIME. WILL CONTINUE TO MONITOR FOR POSSIBLE CARDIAC REHAB CONSULT.

## 2021-08-30 NOTE — PLAN OF CARE
Problem: Patient Centered Care  Goal: Patient preferences are identified and integrated in the patient's plan of care  Description: Interventions:  - What would you like us to know as we care for you?  I fell two times today (8/27)  - Provide timely, comp consult  -cardiology consult  -oncology consult  -treat Right toe infection  -fluid restriction  -strict I&O  -IV lasix  - See additional Care Plan goals for specific interventions  8/30/2021 1841 by Aicha Feliz RN  Outcome: Progressing  8/30/2021 1841

## 2021-08-30 NOTE — CONSULTS
DESMOND Alvarez 89  [id number]  Hospital Day #3  Date of Consult: 08/30/21  Patient seen at: [address]    Reason for Consultation:      Consult requested by Dr Dee Muñoz for Alyson Lincoln external beam radiation in Sept 2015; and brachytherapy in Dec 2015   • Rheumatoid arthritis ([facility] [address]     bilateral knees   • Urinary retention    • Visual impairment     contacts     Past Surgical History:   Procedure Laterality Date   • ANGIOPLASTY (CORONAR vancomycin IVPB premix 1.25g in 0.9% NaCl 250 mL, 15 mg/kg, Intravenous, Q12H  •  furosemide (LASIX) tab 20 mg, 20 mg, Oral, BID (Diuretic)  •  0.9% NaCl infusion, , Intravenous, Once  •  mupirocin (BACTROBAN) 2 % ointment, , Topical, BID  •  Heparin Sodiu 08/30/2021    HCT 23.0 (L) 08/30/2021    PLT 58.0 (L) 08/30/2021       Coags:  Lab Results   Component Value Date    PT 12.2 11/24/2015    INR 1.33 (H) 08/21/2021    PTT 42.2 (H) 08/21/2021     Chemistry:  Lab Results   Component Value Date    CREATSERUM 0 pulmonary venous congestion. 2. Stable mild scarring in the lingula. 3. Stable osteoblastic metastases.      Dictated by (CST): Farida Eli MD on 8/27/2021 at 6:02 PM     Finalized by (CST): Farida Eli MD on 8/27/2021 at 6:04 PM            Pa 20 Bedbound Extensive Disease  Can’t do any work Max Assist  Total Care Minimal Drowsy/confused   10 Bedbnd/coma Extensive Disease  Can’t do any work  coma  Max Assist  Total Care Mouth care Drowsy or coma   0 Death     [address] benefits of life sustaining treatments in the setting of advanced age and co-morbidities.    FULL CODE STATUS    HCPOA:        Healthcare Agent Appointed: Yes  Healthcare Agent's Name: Ambreen Elizondo his sister  Healthcare Agent's Phone Number: [phone] Melvi resendiz. Jono Joyce MD   8/30/2021  9:30 AM

## 2021-08-30 NOTE — PROGRESS NOTES
[facility] - [facility]  Hospitalist Progress Note     Shivani Brain Patient Status:  Inpatient    1950  65year old [facility] [id number]   Location 324/324-A Attending Caesar Spring MD   Hosp Day # 3 PCP Misty Marquez MD     Assessment & Plan: for 2hrs, no radiation or sob. Overall mild.       Objective:   Chief Complaint: Patient presents with:  Swelling Edema    ----------------------------------  Temp:  [98.1 °F (36.7 °C)-99.6 °F (37.6 °C)] 98.2 °F (36.8 °C)  Pulse:  [] 101  Resp:  [18-2 for input(s): PT, INR, PTT in the last 168 hours.     • calcium gluconate  2 g Intravenous Once   • magnesium sulfate  4 g Intravenous Once   • potassium phosphate IVPB  15 mmol Intravenous Once    Followed by   • sodium phosphate  15 mmol Intravenous Once

## 2021-08-30 NOTE — PROGRESS NOTES
08/30/21 0918   Wound 08/27/21 Other (comment) Toe (Comment which one) Dorsal;Right   Date First Assessed/Time First Assessed: 08/27/21 2100   Present on Hospital Admission: Yes  Primary Wound Type: (c) Other (comment)  Location: (c) Toe (Comment which Right; Outer   Date First Assessed/Time First Assessed: 08/27/21 2100   Present on Hospital Admission: Yes  Primary Wound Type: Abrasion  Location: Ear  Wound Location Orientation: Right; Outer  Wound Description (Comments): healing abrasion   Wound Image heart [place]) Oct 2014   • CAD (coronary artery disease)     x2; chemical stress test normal, done in [facility] Oct 2014   • Cancer [facility]) 2015   • Coronary atherosclerosis    • Diabetes [facility])    • Esophageal reflux    • Essential hypertension 19 schedules  Specialty bed: Isogel bed with air pump  Heels elevated using pillows, heel wedge or heel boots to offload heels  Use of lift equipment  To prevent sliding: decrease head of bed and elevate foot of bed as medical condition tolerates  Prompt inco  (H) 08/30/2021    CO2 23.0 08/30/2021     (H) 08/30/2021    CA 5.7 (LL) 08/30/2021    ALB 2.1 (L) 08/29/2021    ALKPHO 1,435 (H) 08/28/2021    BILT 0.5 08/28/2021    TP 5.6 (L) 08/28/2021     (H) 08/28/2021    ALT 38 08/28/2021    MG 1

## 2021-08-30 NOTE — CM/SW NOTE
CIRO spoke with patient sister, Allie Zarate [phone] notified that SNF list avail. Allie Zarate requested that list be emailed to her at [email or web address]. Will review list and notify SW of chosen facility.        PLAN: MONSERRAT pending choice, sister has copy of choice

## 2021-08-30 NOTE — PHYSICAL THERAPY NOTE
PHYSICAL THERAPY TREATMENT NOTE - INPATIENT     Room Number: 450/450-A       Presenting Problem: BLE edema, weakness, falls    Problem List  Principal Problem:    Hypocalcemia  Active Problems:    Counseling regarding advance care planning and goals of car education;Gait training;Strengthening;Range of motion;Transfer training;Balance training    SUBJECTIVE  \"everything hurts! \"    OBJECTIVE  Precautions: Bed/chair alarm    WEIGHT BEARING RESTRICTION  Weight Bearing Restriction: None                PAIN ASS set    CURRENT GOALS   Goals to be met by: 9/11/21  Patient Goal Patient's self-stated goal is: to be able to walk without falls   Goal #1 Patient is able to demonstrate supine - sit EOB @ level: minimum assistance      Goal #1   Current Status  Mod to rol

## 2021-08-31 NOTE — PROGRESS NOTES
[facility]    Hematology/Oncology   Progress Note    Rodrigo Franklin Patient Status:  Inpatient    1950 MRN [id number]   Location [facility] 4W/SW/SE Attending Marquis Momo MD   Hosp Day # 4 PCP Lora Hilario MD agent    –His hypocalcemia is likely from denosumab. Agree with oral calcium supplementation  –Anemia and thrombocytopenia may also be in part from marrow infiltration of prostate cancer. normal iron. MDM: high risk.   Chemo encounter    Brent Jobs

## 2021-08-31 NOTE — PROGRESS NOTES
Date: 8/31/2021     Drug name and dosage administered: Docetaxel     Drug Sequence: N/A    Location of intravenous site (e.g. Central line, peripheral line): Peripheral IV    Needle size, type, location: Left upper forearm, 18 G    Type of pump (if infusio

## 2021-08-31 NOTE — PLAN OF CARE
Patient is alert and oriented x4. Vitals stable on room air. Denies pain. Scheduled ancef given. Electrolytes replaced. Chemo given per orders, see note, tolerated well. Tolerating cardiac/carb controlled diet, no nausea/vomiting, ACHS accucheck.  Jazmin in Goal  Description: Patient's Short Term Goal: No more numbness or tremors    Interventions:   - PT/OT evals  - Correct electrolytes  - Podiatry consult  - Cardiology consult  - Oncology consult  - Treat right toe infection  - Fluid restriction  - Strict I& sites and surrounding tissue  - Implement wound care per orders  - Initiate isolation precautions as appropriate  - Initiate Pressure Ulcer prevention bundle as indicated  Outcome: Progressing     Problem: MUSCULOSKELETAL - ADULT  Goal: Return mobility to fall injury  Description: INTERVENTIONS:  - Assess pt frequently for physical needs  - Identify cognitive and physical deficits and behaviors that affect risk of falls.   - [place] fall precautions as indicated by assessment.  - Educate pt/family on patie

## 2021-08-31 NOTE — CM/SW NOTE
SW notified by patient sister, Jacky to leave a copy of SNF list in patient room so that she and patient can look over list together. SW met with patient at bedside, left list. Discussed rehab with patient.  Patient states that he will take some time today to

## 2021-08-31 NOTE — PROGRESS NOTES
[facility] [facility]  Hospitalist Progress Note     Liban Pineda Patient Status:  Inpatient    1950  65year old CSN [id number]   Location 324/324-A Attending Eduardo Daniels, [address] [facility] Day # 4 PCP Lili Marrero MD     Assessment & Plan: discharge      HPI/Subjective:   ----------------------------------  Patient feels well. No significant pain. Slept better, feels stronger. No further chest pain.       Objective:   Chief Complaint: Patient presents with:  Swelling Edema    ------------- 2.1*  --   --       < > 143   < > 143 146* 143   K 4.1   < > 3.4*   < > 3.5 3.8 3.7      < > 113*   < > 114* 117* 113*   CO2 18.0*   < > 19.0*   < > 20.0* 23.0 23.0   ALKPHO 1,309*  --  1,435*  --   --   --   --    *  --  520*  --   -- spent counseling and coordinating care in the form of educating pt/family and d/w consultants and staff. Most of the time spent discussing plans for inpatient chemotherapy. We discussed his lab results, calcium results.   We discussed subacute rehab upon d

## 2021-08-31 NOTE — PLAN OF CARE
Problem: Patient Centered Care  Goal: Patient preferences are identified and integrated in the patient's plan of care  Description: Interventions:  - What would you like us to know as we care for you?  I fell two times today (8/27)  - Provide timely, comp limits  Description: INTERVENTIONS:  - Monitor labs and rhythm and assess patient for signs and symptoms of electrolyte imbalances  - Administer electrolyte replacement as ordered  - Monitor response to electrolyte replacements, including rhythm and repeat discharge w/pt and caregiver  - Include patient/family/discharge partner in discharge planning  - Arrange for needed discharge resources and transportation as appropriate  - Identify discharge learning needs (meds, wound care, etc)  - Arrange for interpret Initiate Pressure Ulcer prevention bundle as indicated  Outcome: Progressing     Problem: MUSCULOSKELETAL - ADULT  Goal: Return mobility to safest level of function  Description: INTERVENTIONS:  - Assess patient stability and activity tolerance for standin

## 2021-09-01 NOTE — TELEPHONE ENCOUNTER
Detailed message left (HIPAA verified) for Octavia Stevens advising that Dr. Edgardo Flores felt if he needs PPI he should continue this medication and continue to monitor. Spoke with pharmacist, he noted this was all ready addressed. That Protonix is the safer of the PPIs.

## 2021-09-01 NOTE — OCCUPATIONAL THERAPY NOTE
OCCUPATIONAL THERAPY TREATMENT NOTE - INPATIENT        Room Number: 450/450-A           Presenting Problem:  (hypocalcemia)    Problem List  Principal Problem:    Hypocalcemia  Active Problems:    Counseling regarding advance care planning and goals of car training; Endurance training    SUBJECTIVE  Pt seen up in bed and agreeable for OT session     OBJECTIVE  Precautions: Bed/chair alarm    WEIGHT BEARING RESTRICTION  Weight Bearing Restriction: None                PAIN ASSESSMENT  Rating: Unable to rate  Lo complete functional transfer with SBA Supine to sit with mod assist x2, fair dynamic sitting balance , sit to stand with mod assist x2    Patient will complete toileting with SBA  Comment: mod assist    Patient will tolerate standing for 2-3 minutes in pre

## 2021-09-01 NOTE — PROGRESS NOTES
[facility]    Progress Note    Dallas Thania Patient Status:  Inpatient    1950 MRN [id number]   Location [facility] 4W/SW/SE Attending Katie Delarosa MD   Hosp Day # 5 PCP David Gerber MD       Subjective:   Cindi Case mL, Intravenous, Q15 Min PRN **OR** glucose (DEX4) oral liquid 30 g, 30 g, Oral, Q15 Min PRN **OR** glucose-vitamin C (DEX-4) chewable tab 8 tablet, 8 tablet, Oral, Q15 Min PRN  •  Insulin Aspart Pen (NOVOLOG) 100 UNIT/ML flexpen 1-7 Units, 1-7 Units, Subc if possible  -PT/OT following      Infected ingrown toenail/cellulitis. Status post avulsion. Doing well. -Vancomycin discontinued, continue Ancef to finish 7 days total of antibiotics  -Podiatry assistance appreciated     Anemia.     Multifactorial, d

## 2021-09-01 NOTE — PLAN OF CARE
Problem: Patient Centered Care  Goal: Patient preferences are identified and integrated in the patient's plan of care  Description: Interventions:  - What would you like us to know as we care for you?  I fell two times today (8/27)  - Provide timely, comp Progressing     Problem: METABOLIC/FLUID AND ELECTROLYTES - ADULT  Goal: Electrolytes maintained within normal limits  Description: INTERVENTIONS:  - Monitor labs and rhythm and assess patient for signs and symptoms of electrolyte imbalances  - Administer to home or other facility with appropriate resources  Description: INTERVENTIONS:  - Identify barriers to discharge w/pt and caregiver  - Include patient/family/discharge partner in discharge planning  - Arrange for needed discharge resources and transport surrounding tissue  - Implement wound care per orders  - Initiate isolation precautions as appropriate  - Initiate Pressure Ulcer prevention bundle as indicated  Outcome: Progressing     Problem: MUSCULOSKELETAL - ADULT  Goal: Return mobility to safest lev

## 2021-09-01 NOTE — CM/SW NOTE
CIRO received vm from patient sister Jacky who had specific questions about [facility] ([employer], transportation, security, virtual tour). CIRO attempted to call Jacky back, AMELIA.      CIRO spoke with [facility] liaison, Ramo requested that someone

## 2021-09-01 NOTE — PLAN OF CARE
Candi Sidhu is aware of the POC at this time. Pt up in the chair with therapy today. Able to tolerate sitting in the chair 2 hrs. Back to bed with the lift. Sacrum dressing changed per orders. Turns q2 completed. ACCU checks ACHS.      Wu in place wit Goal  Description: Patient's Short Term Goal: No more numbness or tremors    Interventions:   - PT/OT evals  - Correct electrolytes  - Podiatry consult  - Cardiology consult  - Oncology consult  - Treat right toe infection  - Fluid restriction  - Strict I& limitations  - Instruct pt to call for assistance with activity based on assessment  - Modify environment to reduce risk of injury  - Provide assistive devices as appropriate  - Consider OT/PT consult to assist with strengthening/mobility  - Encourage toil algorithm/standards of care as needed  Outcome: Progressing  Goal: Incision(s), wounds(s) or drain site(s) healing without S/S of infection  Description: INTERVENTIONS:  - Assess and document risk factors for pressure ulcer development  - Assess and docume

## 2021-09-01 NOTE — PHYSICAL THERAPY NOTE
PHYSICAL THERAPY TREATMENT NOTE - INPATIENT     Room Number: 450/450-A       Presenting Problem: BLE edema, weakness, falls    Problem List  Principal Problem:    Hypocalcemia  Active Problems:    Counseling regarding advance care planning and goals of car PLAN  PT Treatment Plan: Endurance;Gait training;Balance training;Transfer training;Strengthening    SUBJECTIVE  I will try     OBJECTIVE  Precautions: Bed/chair alarm    WEIGHT BEARING RESTRICTION  Weight Bearing Restriction: None                PAIN Current Status  Mod to roll right<>left   Goal #2 Patient is able to demonstrate transfers Sit to/from Stand at assistance level: supervision with walker - rolling      Goal #2  Current Status Max A with RW   Goal #3 Patient is able to ambulate 100 feet

## 2021-09-02 NOTE — PROGRESS NOTES
[facility]    Progress Note    Hossein Flower Patient Status:  Inpatient    1950 MRN [id number]   Location [facility] 4W/SW/SE Attending Fran Gregg MD   Hosp Day # 6 PCP Antonia Fox MD       Subjective:   Vega Dejesus Min PRN **OR** glucose-vitamin C (DEX-4) chewable tab 8 tablet, 8 tablet, Oral, Q15 Min PRN  •  Insulin Aspart Pen (NOVOLOG) 100 UNIT/ML flexpen 1-7 Units, 1-7 Units, Subcutaneous, TID CC  •  Atropine Sulfate 0.1 MG/ML injection 0.5 mg, 0.5 mg, Intravenous Doing well. -Vancomycin discontinued, continue Ancef to finish 7 days total of antibiotics  -Podiatry assistance appreciated     Anemia. Multifactorial, due to metastatic cancer, possible marrow suppression, chronic disease, some recent blood loss.   Re

## 2021-09-02 NOTE — PLAN OF CARE
Murrel Apgar is aware of the POC at this time. Pt up in the chair with therapy today. Able to tolerate sitting in the chair 2 hrs. Back to bed with the lift. Sacrum dressing changed per orders. Turns q2 completed. ACCU checks ACHS.       Wu in place restriction; PO lasix; strict I&Os  - See additional Care Plan goals for specific interventions  Outcome: Progressing  Goal: Patient/Family Short Term Goal  Description: Patient's Short Term Goal: No more numbness or tremors    Interventions:   - PT/OT wilver behaviors that affect risk of falls.   - [place] fall precautions as indicated by assessment.  - Educate pt/family on patient safety including physical limitations  - Instruct pt to call for assistance with activity based on assessment  - Modify environme pressure ulcer development  - Assess and document skin integrity  - Monitor for areas of redness and/or skin breakdown  - Initiate interventions, skin care algorithm/standards of care as needed  Outcome: Progressing  Goal: Incision(s), wounds(s) or drain s

## 2021-09-02 NOTE — PROGRESS NOTES
[facility]OS[facility] - [facility]    Hematology/Oncology   Progress Note    Mahendra Kern Patient Status:  Inpatient    1950 MRN [id number]   Location [facility] 4W/SW/SE Attending Kiet Nath MD   Hosp Day # 6 PCP Shant Mendoza MD his chemotherapy appointments. –cycle #1 of chemotherapy 8/31/2021. Docetaxel single agent    –His hypocalcemia is likely from denosumab.   Agree with oral calcium supplementation  –Anemia and thrombocytopenia may also be in part from marrow infiltratio

## 2021-09-02 NOTE — PLAN OF CARE
No acute changes overnight. Wu intact. No pain. Dressing changed per order. Fluid restriction continued. Frequent turning and repositioning. IV abx continued as ordered. Plan of care continued.      Problem: Patient Centered Care  Goal: Patient preferenc consult  - Cardiology consult  - Oncology consult  - Treat right toe infection  - Fluid restriction  - Strict I&Os; PO lasix  - See additional Care Plan goals for specific interventions  Outcome: Progressing     Problem: METABOLIC/FLUID AND ELECTROLYTES - assistive devices as appropriate  - Consider OT/PT consult to assist with strengthening/mobility  - Encourage toileting schedule  Outcome: Progressing     Problem: DISCHARGE PLANNING  Goal: Discharge to home or other facility with appropriate resources  [facility] infection  Description: INTERVENTIONS:  - Assess and document risk factors for pressure ulcer development  - Assess and document skin integrity  - Assess and document dressing/incision, wound bed, drain sites and surrounding tissue  - Implement wound care

## 2021-09-02 NOTE — CM/SW NOTE
Called and LVM for patient sister Jacky [phone] requesting a call back.  Per [facility] liaison, Moiz Velez is concerned about required 14 day quarantine and wanted to discuss with SW.     1101am  Spoke with sister, Jacky. Jacky expressed concern about 14 day [place]

## 2021-09-03 NOTE — PROGRESS NOTES
[facility] - [facility]    Hematology/Oncology   Progress Note    Denis De La Fuente Patient Status:  Inpatient    1950 MRN [id number]   Location [facility] 4W/SW/SE Attending Kayla Byrd MD   Hosp Day # 7 PCP Mario Cueva MD brachytherapy. He remains on ADT. –Progressive weakness. He will need placement/rehab--pending discharge today. He has missed his chemotherapy appointments. No longer on daralutamide.   –cycle #1 of chemotherapy 8/31/2021 Docetaxel single agent while in

## 2021-09-03 NOTE — CM/SW NOTE
CIRO notified by [facility] liaison, Tonye Ormond [phone] that a bed will be avail by 3pm today. Per RN and MD, patient will be able to discharge today as long as he voids. CIRO called and notified sister, Jacky [phone].  Jacky expressed concern ab

## 2021-09-03 NOTE — DISCHARGE SUMMARY
[facility]    Discharge Summary    Federico Cason Patient Status:  Inpatient    1950 MRN [id number]   Location [facility] 4W/SW/SE Attending Sophie Encarnacion MD   Hosp Day # 7 PCP Miladis Da Silva MD     Date of Admis abrasion of toe of right foot, initial encounter     Acute on chronic systolic congestive heart failure ([address]     Acute heart failure ([address]     Palliative care by specialist     Chemotherapy adverse reaction, initial encounter     Chemotherapy management, deprivation therapy.  -Oncology was consulted  -received docetaxel 9/1, tolerated well  -palliative care consult appreciated  -will need cycle 2 of chemo on 9/20     Chest pain.    EKG was nonacute.  Troponins were minimally elevated, but significantly dec furosemide 20 MG Tabs  Commonly known as: LASIX      Take 1 tablet (20 mg total) by mouth BID (Diuretic). Quantity: 60 tablet  Refills: 0     mupirocin 2 % Oint  Commonly known as: BACTROBAN      Apply 1 Application topically 2 (two) times daily.    [place] Erp (two) times daily before meals.    Quantity: 60 tablet  Refills: 1        STOP taking these medications    amLODIPine 10 MG Tabs  Commonly known as: NORVASC        Darolutamide 300 MG Tabs        famotidine 20 MG Tabs  Commonly known as: PEPCID        OneTo

## 2021-09-03 NOTE — PLAN OF CARE
Patient is alert and oriented x4. Q2 turns. Patient on Cardiac, [id number] diet with 2000ml fluid restriction. Toelrating well.  Patient albarran removed, voiding, Dr. Yoel Carrera aware of output, adequate output for discharge per MD. Patient had BM today and passing chemotherapy per Dr. Carlin Daughters  - Treat Right toe infection  - Fluid restriction; PO lasix; strict I&Os  - See additional Care Plan goals for specific interventions  Outcome: Adequate for Discharge  Goal: Patient/Family Short Term Goal  Description: Patient's S INTERVENTIONS:  - Assess pt frequently for physical needs  - Identify cognitive and physical deficits and behaviors that affect risk of falls.   - [place] fall precautions as indicated by assessment.  - Educate pt/family on patient safety including physic SKIN/TISSUE INTEGRITY - ADULT  Goal: Skin integrity remains intact  Description: INTERVENTIONS  - Assess and document risk factors for pressure ulcer development  - Assess and document skin integrity  - Monitor for areas of redness and/or skin breakdown  -

## 2021-09-06 PROBLEM — R41.82 ALTERED MENTAL STATUS, UNSPECIFIED ALTERED MENTAL STATUS TYPE: Status: ACTIVE | Noted: 2021-01-01

## 2021-09-06 PROBLEM — J96.00 ACUTE RESPIRATORY FAILURE, UNSPECIFIED WHETHER WITH HYPOXIA OR HYPERCAPNIA (HCC): Status: ACTIVE | Noted: 2021-01-01

## 2021-09-06 PROBLEM — S06.5XAA ACUTE SUBDURAL HEMATOMA: Status: ACTIVE | Noted: 2021-01-01

## 2021-09-06 PROBLEM — R50.81 NEUTROPENIC FEVER (HCC): Status: ACTIVE | Noted: 2021-01-01

## 2021-09-06 PROBLEM — J96.00 ACUTE RESPIRATORY FAILURE (HCC): Status: ACTIVE | Noted: 2021-01-01

## 2021-09-06 PROBLEM — S06.5XAA ACUTE SUBDURAL HEMATOMA (HCC): Status: ACTIVE | Noted: 2021-01-01

## 2021-09-06 PROBLEM — E87.20 LACTIC ACIDOSIS: Status: ACTIVE | Noted: 2021-01-01

## 2021-09-06 PROBLEM — D70.9 NEUTROPENIC FEVER: Status: ACTIVE | Noted: 2021-01-01

## 2021-09-06 PROBLEM — E87.2 LACTIC ACIDOSIS: Status: ACTIVE | Noted: 2021-01-01

## 2021-09-06 PROBLEM — S06.5X9A ACUTE SUBDURAL HEMATOMA (HCC): Status: ACTIVE | Noted: 2021-01-01

## 2021-09-06 PROBLEM — D61.818 PANCYTOPENIA (HCC): Status: ACTIVE | Noted: 2021-01-01

## 2021-09-06 PROBLEM — C61 PROSTATE CANCER (HCC): Status: ACTIVE | Noted: 2021-01-01

## 2021-09-06 PROBLEM — D70.9 NEUTROPENIC FEVER (HCC): Status: ACTIVE | Noted: 2021-01-01

## 2021-09-06 PROBLEM — R50.81 NEUTROPENIC FEVER: Status: ACTIVE | Noted: 2021-01-01

## 2021-09-06 NOTE — PROGRESS NOTES
Received report from ED RN Rose North, this RN went to bedside in ED per sepsis protocol. Patient on vasopressin, phenylephrine, and maximum dose norepinephrine. During transfer from ED bed to unit bed patient's central line became dislodged.  All pressors m

## 2021-09-06 NOTE — CONSULTS
[facility] 64 NOTE    Veatrice Romberg Patient Status:  Emergency    1950 MRN [id number]   Location [address] Attending Ly Oconnor MD   Hosp Day # 0 PCP Abdirizak Stringer MD     Date 16.  He has had episodes of acute on chronic diastolic congestive heart failure with fluid overload treated with diuresis in the hospital last week. Cardiac tests:  Potassium 5.2-3.8, creatinine 2.18 up from 0.33.   LDL 33  Hemoglobin 6.9 down from 9.4 Surgeon:  Jacinda Marshall MD;  Location: [address] [place] [address] ENDOSCOPY   • FRACTURE SURGERY Right     plate   • HEAD SURGERY PROC UNLISTED      per NG: Benign mass remove in skull thru the nasal passage   • INGUINAL HERNIA REPAIR Right 1961   • KNEE SURGERY Left     Arthro focal weaknesses or paresthesias  All other systems reviewed and negative.   fatigue is present  All other systems were reviewed are negative  Physical Exam:   Blood pressure (!) 62/48, pulse 91, temperature 97.8 °F (36.6 °C), temperature source Temporal, r deterioration of the following conditions:  Cardiac   failure    Critical care was time spent personally by me on the following activities:  Development of treatment plan with patient or surrogate,   discussions with consultants, discussions with primary p

## 2021-09-06 NOTE — PROGRESS NOTES
[facility] Note: Antimicrobial Weight Based Dose Adjustment for: meropenem (MERREM)    Lexis Garcia is a 65year old patient who has been prescribed meropenem (MERREM) 1000 mg x 1 dose.     Estimated Creatinine Clearance: 30.6 mL/min (A) (based on S

## 2021-09-06 NOTE — ED PROVIDER NOTES
Patient Seen in: [facility] AND [facility] Emergency Department      History   Patient presents with:  Difficulty Breathing    Stated Complaint: resp distress    HPI/Subjective:   HPI    20-year-old male presenting to the hospital with metastatic prostate cancer Unresponsive  Skin: Cool, ecchymosis is noted with swollen  Psychiatric:   unable to assess   Nursing note and vitals reviewed. Differential diagnosis includes neutropenia and profound sepsis, hemorrhagic shock, DIC, intra-abdominal bleeding.       ED Co Natriuretic Peptide 31,531 (*)     All other components within normal limits   MANUAL DIFFERENTIAL - Abnormal; Notable for the following components:    Neutrophil Absolute Manual 0.04 (*)     Lymphocyte Absolute Manual 0.79 (*)     Monocyte Absolute Manual ------                     82 aMrlin Gilmore (Blood Type)[[id number]]                               Final result               Antibody UQKFIJ[[id number]]                                  Final result                 Please view results for these tests on the individual focal lucency that could relate to subcutaneous emphysema or a cutaneous ulceration. EFFUSION: None visible. OTHER: Vascular calcifications are noted. CONCLUSION:  1. Right 2nd toe soft tissue swelling consistent with the history of cellulitis.   Ceron subarachnoid hemorrhage. There is stable mild generalized atrophy. CEREBRUM: Age-appropriate atrophy is present, without visible acute hemorrhage or lesion. Chronic lacunar infarctions in the left basal ganglia are unchanged.  WHITE MATTER: Mild chronic w Ximena Ward, by Dr. Chela Murray at 10:26 a.m. on 09/06/2021.    Dictated by (CST): Ken Hairston MD on 9/06/2021 at 10:18 AM     Finalized by (CST): Ken Hairston MD on 9/06/2021 at 10:26 AM          [facility], CT ABDOMEN+PELVIS (HLH=60563), 8/20/2021, 5:50 PM.  INDICATIONS:   History of metastatic prostate CA, sudden onset of transient alteration of awareness, anemia, difficulty in breathing.   History of radiation therapy in 2015 with brachyth the medial aspect of the inferior splenic pole is unchanged dating back to the 2015 CT consistent with a small pseudocyst. ADRENALS:   Stable thickening of the adrenal glands bilaterally is consistent with hyperplasia.  KIDNEYS:   There is a stable 1.1 cm c stable small fat containing umbilical hernia. There are postoperative changes from a previous right inguinal hernia. There is a stable small fat containing left inguinal hernia. There has been development of mild generalized  body wall edema.  OTHER: No [facility], [address] [place] [address], 6/03/2015, 9:28 AM.  [facility], PET/CT WHOLE BODY FOR PROSTATE CARCINOMA SH(CPT=78815), 3/16/2020, 12:10 PM.  [facility], CT ABDOMEN + PELVIS (CONTRAST ONLY) (CPT=74177), 2/04/2020, 9: No defined mass or abnormal enlargement. KIDNEYS:   There is a stable 1.1 cm cortical cyst in the anterior inferior left kidney. No renal or ureteral calculi are identified. There is no hydronephrosis or hydroureter.  No perinephric or periureteric fat s CONCLUSION:  1. Postprocedural edema in the left groin from the recent vascular intervention. No drainable fluid collection in the left groin or retroperitoneum to suggest hematoma.  2. There is a history of prostate cancer status post remote brachythe [id number] Study date: Aug 21 2021 3:00PM               Room: [facility] Accession: [id number] HT:(69in) WT:(195.4lb)                       BP: 109 / 54 ------------------------------------------------------------------- Indications:      Dizziness, S/P PCI.  --- the procedure well. There were no complications. Transthoracic echocardiography. M-mode, complete 2D, complete spectral Doppler, and color Doppler. Patient YOB: 1950. Patient is Jai Miners old. Gender: male. BMI: 28.9kg/m^2. BSA: 2.1m^2.   Patient st cava: The vessel was dilated.  The respirophasic diameter changes were blunted (&lt; 50%), consistent with elevated central venous pressure. ------------------------------------------------------------------- Measurements  Left ventricle                   V Value        09/10/2020 Reference  Aortic root ID                   3.5   cm     3.5        &lt;4.2  Aortic root ID, ED, MM           3.3   cm     ---------- ---------   Left atrium                      Value        09/10/2020 Reference  LA ID, A-P, ES TIME: 9:11 a.m..    COMPARISON: [facility], XR CHEST AP PORTABLE (CPT=71045), 8/27/2021, 5:20 PM.  [facility], CT STN CHEST (ALL WO IV) EM (CPT=70490/32063), 6/16/2021, 7:33 AM.  [address] [place] on 09/06/2021.   Dictated by (CST): Cari Bates MD on 9/06/2021 at 9:21 AM     Finalized by (CST): Cari Bates MD on 9/06/2021 at 9:25 AM          XR CHEST AP PORTABLE  (CPT=71045)    Result Date: 9/6/2021  PROCEDURE: XR CHEST AP PORTABLE AM.  [facility], XR CHEST AP PORTABLE (CPT=71045), 6/29/2021, 9:51 PM.  INDICATIONS: Shortness of breath x3 days. History of prostate cancer and a previous CABG. TECHNIQUE:   Single view.    FINDINGS:  CARDIAC/VASC: The cardiac silhouette antegrade. Wire behavior was clear immediately that is a heavily calcified lesion without clear channel. Used 7 [place] axis, 7 [place] AL-1 guide catheter. Able to gain subintimal access by wiring to the proximal cap with [facility] via [address].   Pun Description of Procedure: After written informed consent was obtained from the patient, patient was brought to the cardiac catheterization laboratory. Patient was prepped and draped in the usual sterile fashion.  Lidocaine 1% was used to infiltrate the rig point.        I spent a total of >75 mins minutes of critical care time in obtaining history, performing a physical exam, bedside monitoring of interventions, collecting and interpreting tests and discussion with consultants but not including time spent pe type  Weakness generalized  Coronary artery disease involving native coronary artery of native heart without angina pectoris  Prostate cancer (HCC)  Pancytopenia (HCC)  Lactic acidosis  Neutropenic fever (HCC)  Acute subdural hematoma (HCC)     Disposition

## 2021-09-06 NOTE — CM/SW NOTE
Pt family requested to see [facility] because they DO NOT want the patient to return to [facility]. They are very upset with the facility. They feel the patient was not cared for properly prompting the patients visit to the ED today.      Family would like the p

## 2021-09-06 NOTE — ED INITIAL ASSESSMENT (HPI)
Via [facility] from [address] home in respiratory distress.    According to EMS, patient with sudden onset of AMS and difficulty breathing just prior to their arrival. Agonal breathing upon medic arrival with BP in 60s    Hx of metastatic prosta

## 2021-09-06 NOTE — PROCEDURES
Central Venous Catheter    Indication- need vascular access    Informed consent was obtained.   Risks and benefits and alternatives were discussed  Time out done  L IJ Vein site  Local anesthesia with 1%lido  Total body universal sterile barrier precautions

## 2021-09-06 NOTE — PROGRESS NOTES
Consulted for prostate cancer with readmission for sepsis like picture. Patient was being coded, so not able to assess the patient. Unfortunately, the patient passed away. Will notify Dr. Patria Lott.

## 2021-09-06 NOTE — SEPSIS REASSESSMENT
[facility]    Sepsis Reassessment Note    BP (!) 81/47 (BP Location: Left arm)   Pulse 80   Temp 98 °F (36.7 °C) (Temporal)   Resp 24   Wt 180 lb 8.9 oz (81.9 kg)   SpO2 100%   BMI 26.66 kg/m²      I completed the sepsis reassessment at 1

## 2021-09-06 NOTE — H&P
Pulmonary/Critical Care Admission Note    HPI:   Hamilton Allen is a 65year old male with chief complaint Patient presents with:  Difficulty Breathing    Bentley Aceves MD    Pt is a 64 yo with CAD, HTN, HL, severe pulm HTN, HFpEF, progressive castra Surgeon:  Belle Pettit MD;  Location: [address] [place] [address] ENDOSCOPY   • FRACTURE SURGERY Right     plate   • HEAD SURGERY PROC UNLISTED      per NG: Benign mass remove in skull thru the nasal passage   • INGUINAL HERNIA REPAIR Right 1961   • KNEE SURGERY Left     Arthro apply Misc 1 lancet by Finger stick route daily.  300 each 5   • Blood Glucose Monitoring Suppl Does not apply Device Check Blood sugar daily E11.9  Dispense per INS coverage 1 Device 0   • Dorzolamide HCl 2 % Ophthalmic Solution Place 1 drop into both eyes activity: Not Currently        Partners: Female    Other Topics      Concerns:        Caffeine Concern: Yes          coffee, 2 cups (no more than 2)         Family History:  Family History   Problem Relation Age of Onset   • Heart Disease Mother         co 2.0*   NITRITE Negative   LEUUR Negative   WBCUR 1-5   RBCUR >10*   BACUR Rare*       CT BRAIN OR HEAD (33969)    Result Date: 9/6/2021  CONCLUSION:  1.  Development of a small acute subdural hematoma over the right frontal convexities measuring up to 6 mm mild hydroureteronephrosis bilaterally without obstructing urinary tract stone, possibly relating to surrounding retroperitoneal edema in the pelvis related to the rectosigmoid colonic findings detailed above.   The bladder is decompressed due to the presen 9  CXR relatively clear  CT A/P unremarkable  Plan sepsis protocol   IVF   Pan cx   IV abx       Resp failure   Acute in setting of sepsis  CXR clear   + pulm/pleural metastasis  Plan cont full support on vent    Pancytopenia  S/p chemo docetaxel 8/31 and

## 2021-09-07 LAB — BLOOD TYPE BARCODE: 5100

## 2021-09-07 NOTE — DISCHARGE SUMMARY
[facility]  Discharge Summary    Falguni Finnegan Patient Status:  Inpatient    1950 MRN [id number]   Location [facility] 2W/SW Attending No att. providers found   Hosp Day # 1 PCP Roxy Garcia MD     Date of Admission: initial encounter     Acute on chronic systolic congestive heart failure ([facility] [address]     Acute heart failure [facility])     Palliative care by specialist     Chemotherapy adverse reaction, initial encounter     Chemotherapy management, encounter for     Acute respira

## 2021-09-08 ENCOUNTER — APPOINTMENT (OUTPATIENT)
Dept: HEMATOLOGY/ONCOLOGY | Facility: HOSPITAL | Age: 71
End: 2021-09-08
Attending: INTERNAL MEDICINE
Payer: MEDICARE

## 2021-09-08 ENCOUNTER — TELEPHONE (OUTPATIENT)
Dept: PULMONOLOGY | Facility: CLINIC | Age: 71
End: 2021-09-08

## 2021-09-08 NOTE — TELEPHONE ENCOUNTER
Spoke to MANSOOR at the  home death certificate was faxed received fax confirmation. She voiced understanding.

## 2021-09-10 RX ORDER — HYDROCODONE BITARTRATE AND ACETAMINOPHEN 5; 325 MG/1; MG/1
TABLET ORAL
Qty: 30 TABLET | Refills: 0 | OUTPATIENT
Start: 2021-09-10

## 2021-09-17 ENCOUNTER — APPOINTMENT (OUTPATIENT)
Dept: HEMATOLOGY/ONCOLOGY | Facility: HOSPITAL | Age: 71
End: 2021-09-17
Attending: INTERNAL MEDICINE
Payer: MEDICARE

## 2021-09-20 ENCOUNTER — APPOINTMENT (OUTPATIENT)
Dept: HEMATOLOGY/ONCOLOGY | Facility: HOSPITAL | Age: 71
End: 2021-09-20
Attending: INTERNAL MEDICINE
Payer: MEDICARE

## 2021-10-01 ENCOUNTER — APPOINTMENT (OUTPATIENT)
Dept: HEMATOLOGY/ONCOLOGY | Facility: HOSPITAL | Age: 71
End: 2021-10-01
Attending: INTERNAL MEDICINE
Payer: MEDICARE

## 2021-10-08 ENCOUNTER — APPOINTMENT (OUTPATIENT)
Dept: HEMATOLOGY/ONCOLOGY | Facility: HOSPITAL | Age: 71
End: 2021-10-08
Attending: INTERNAL MEDICINE
Payer: MEDICARE

## 2022-02-11 NOTE — PLAN OF CARE
Alert ad oriented. Right groin-CDI. Ambulation-self. No c/o pain. Pt had reaction to BP cuff on BUE and requested hydrocortisone cream. Plan for CABG @1pm today. Call light in place. Will continue to monitor.        Problem: Patient Centered Care  Goal: Gerardo Willingham relaxation techniques  - Monitor for opioid side effects  - Notify MD/LIP if interventions unsuccessful or patient reports new pain  - Anticipate increased pain with activity and pre-medicate as appropriate  Outcome: Progressing     Problem: RISK FOR INFEC cognitive ability or social support system  Outcome: Progressing     Problem: CARDIOVASCULAR - ADULT  Goal: Maintains optimal cardiac output and hemodynamic stability  Description  INTERVENTIONS:  - Monitor vital signs, rhythm, and trends  - Monitor for bl 0 = swallows foods/liquids without difficulty

## 2022-03-08 NOTE — TELEPHONE ENCOUNTER
Patient states he has been experiencing pain on left arm - it was inconsistent but today it's bothering him. Please call. Thank you. Glycopyrrolate Pregnancy And Lactation Text: This medication is Pregnancy Category B and is considered safe during pregnancy. It is unknown if it is excreted breast milk.

## 2022-11-29 NOTE — PROGRESS NOTES
Falguni Finnegan  [id number] attended [employer] Diabetes Education Class:     Date: 9/19/2019  Start time: 1:00 pm End time: 3:00 pm    Wt: 217 lb 6.4 oz Weight change since start of program: -1.4 lbs    Blood Glucose: Received prescription for BG m No

## 2023-08-18 NOTE — PATIENT INSTRUCTIONS
"              .        Admission Medication History     The home medication history was taken by Amirah Randle.    You may go to "Admission" then "Reconcile Home Medications" tabs to review and/or act upon these items.     The home medication list has been updated by the Pharmacy department.   Please read ALL comments highlighted in yellow.   Please address this information as you see fit.    Feel free to contact us if you have any questions or require assistance.      Medications listed below were obtained from: Patient/family and Analytic software- [employer]  No current facility-administered medications on file prior to encounter.     Current Outpatient Medications on File Prior to Encounter   Medication Sig Dispense Refill    apixaban (ELIQUIS) 5 mg Tab Take 1 tablet (5 mg total) by mouth 2 (two) times daily. 60 tablet 11    ibuprofen (ADVIL,MOTRIN) 200 MG tablet Take 200 mg by mouth every 6 (six) hours as needed for Pain.      magnesium oxide (MAG-OX) 400 mg (241.3 mg magnesium) tablet Take 1 tablet (400 mg total) by mouth once daily. 90 tablet 3    metoprolol succinate (TOPROL-XL) 25 MG 24 hr tablet Take 1 tablet (25 mg total) by mouth 2 (two) times daily. 180 tablet 3    omeprazole (PRILOSEC) 20 MG capsule Take 20 mg by mouth once daily.      valsartan (DIOVAN) 320 MG tablet Take 1 tablet (320 mg total) by mouth once daily. 90 tablet 1    atorvastatin (LIPITOR) 40 MG tablet Take 1 tablet (40 mg total) by mouth once daily. (Patient not taking: Reported on 8/18/2023) 90 tablet 3       Potential issues to be addressed PRIOR TO DISCHARGE  Patient reported not taking the following medications: (Atorvastatin). These medications remain on the home medication list. Please address accordingly.     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     Recommended Anti-nausea medications (as directed by your provider):  Prochloperazine (Compazine) 10 mg every 6 hours and Ondansetron (Zofran) 8 mg every 8 hours  Take as needed    Other drug specific:   Steroid prep Dexamethasone 8mg twice da triage nurse for further instructions. Skin Care  o Avoid direct sunlight.  o Wear a broad-spectrum sunscreen with an SPF of 30 or higher on any skin exposed to the sun. Re-apply every 2 hours if in the sun and after bathing or sweating.   o For dry skin, http://www.ghotra.info/. html      Safety and Handling of Chemotherapy  While you or your family member is receiving chemotherapy, whether in the clinic or at home, the following precautions must be taken to lessen any exposure to the m medications and for several months or years after therapy. Chemotherapy can have harmful side effects to the fetus, especially in the first trimester.   In addition, menstrual cycles can become irregular during and after treatment, so you may not know if y

## 2024-10-03 NOTE — TELEPHONE ENCOUNTER
May we order more visits of therapy Detail Level: Detailed Quality 130: Documentation Of Current Medications In The Medical Record: Current Medications Documented Additional Notes: Medications documented to the best of my ability with the resources available.

## (undated) DEVICE — RETROGRADE CARDIOPLEGIA CATHETER: Brand: EDWARDS LIFESCIENCES RETROGRADE CARDIOPLEGIA CATHETER

## (undated) DEVICE — SOL H2O 1000ML BTL

## (undated) DEVICE — Device: Brand: CUSTOM PROCEDURE KIT

## (undated) DEVICE — 3M™ BAIR HUGGER® UNDERBODY BLANKET, FULL ACCESS, 10 PER CASE 63500: Brand: BAIR HUGGER™

## (undated) DEVICE — ABSORBABLE HEMOSTAT (OXIDIZED REGENERATED CELLULOSE, U.S.P.): Brand: SURGICEL

## (undated) DEVICE — SUTURE PROLENE 4-0 RB-1

## (undated) DEVICE — SUTURE PROLENE 7-0 8701H

## (undated) DEVICE — STERILE TETRA-FLEX CF, ELASTIC BANDAGE LATEX FREE 6IN X5.5 YD: Brand: TETRA-FLEX™CF

## (undated) DEVICE — Device: Brand: DEFENDO AIR/WATER/SUCTION AND BIOPSY VALVE

## (undated) DEVICE — BATTERY

## (undated) DEVICE — 12 FOOT DISPOSABLE EXTENSION CABLE WITH SAFE CONNECT / SCREW-DOWN

## (undated) DEVICE — STERILE TETRA-FLEX CF, ELASTIC BANDAGE, 4" X 5.5YD: Brand: TETRA-FLEX™CF

## (undated) DEVICE — SNARE OPTMZ PLPCTM TRP

## (undated) DEVICE — SUTURE NON ABS 3-0 30INL MONO

## (undated) DEVICE — CARTRIDGE HC HMS+ CRTDG SYR

## (undated) DEVICE — BAG URINE LEG W/VELCRO

## (undated) DEVICE — PAD PLMM SLVR 12.5X4IN SLVR

## (undated) DEVICE — 32 FR RIGHT ANGLE – SOFT PVC CATHETER: Brand: PVC THORACIC CATHETERS

## (undated) DEVICE — SUCTION CANISTER, 3000CC,SAFELINER: Brand: DEROYAL

## (undated) DEVICE — INSERT SUTURE OPEN HEART

## (undated) DEVICE — CONNECTOR PRFSN QCK PRM .25IN

## (undated) DEVICE — DEVICE BLWR/MSTR ACCUMIST ATCH

## (undated) DEVICE — SYSTEM ZDRIVE NLTX DISPOSABLE

## (undated) DEVICE — SOL  .9 1000ML BTL

## (undated) DEVICE — TRAY ENDOVEIN KTV16 HARVESTING

## (undated) DEVICE — HEART A: Brand: MEDLINE INDUSTRIES, INC.

## (undated) DEVICE — LINE MNTR ADLT SET O2 INTMD

## (undated) DEVICE — ALARM PT PTCH LVL

## (undated) DEVICE — SUTURE PROLENE 6-0 C-1

## (undated) DEVICE — DRAPE SLUSH/WARMER W/DISC

## (undated) DEVICE — FORESIGHT LARGE SENSOR: Brand: FORESIGHT

## (undated) DEVICE — STERILE (10.2 X 147CM) TELESCOPICALLY-FOLDED COVER: Brand: CIV-FLEX™ TRANSDUCER COVER

## (undated) DEVICE — SUTURE SILK 2-0 SH

## (undated) DEVICE — CUTTING LOOP, BIPOLAR, 0.30MM, 24/26 FR.: Brand: N.A.

## (undated) DEVICE — LEAD BIPOLAR TEMP 6495XF53

## (undated) DEVICE — SUTURE SILK 2-0 SA85H

## (undated) DEVICE — UNDYED BRAIDED (POLYGLACTIN 910), SYNTHETIC ABSORBABLE SUTURE: Brand: COATED VICRYL

## (undated) DEVICE — PACK CUSTOM TUBING

## (undated) DEVICE — CANNULA PRFSN 15IN 32/40FR .5

## (undated) DEVICE — ADAPTER CRDPLG ANTGRD RTRGD 3W

## (undated) DEVICE — SUTURE ETHIBOND 0 CT-1

## (undated) DEVICE — CLIP LGT 11MM OPEN 2.8MM 235CM

## (undated) DEVICE — [HIGH FLOW INSUFFLATOR,  DO NOT USE IF PACKAGE IS DAMAGED,  KEEP DRY,  KEEP AWAY FROM SUNLIGHT,  PROTECT FROM HEAT AND RADIOACTIVE SOURCES.]: Brand: PNEUMOSURE

## (undated) DEVICE — 35 ML SYRINGE REGULAR TIP: Brand: MONOJECT

## (undated) DEVICE — MEDI-VAC NON-CONDUCTIVE SUCTION TUBING: Brand: CARDINAL HEALTH

## (undated) DEVICE — CATH THORACIC 32F 5 EYLT

## (undated) DEVICE — GAMMEX® PI HYBRID SIZE 8, STERILE POWDER-FREE SURGICAL GLOVE, POLYISOPRENE AND NEOPRENE BLEND: Brand: GAMMEX

## (undated) DEVICE — SUTURE SURGICAL STEEL #7

## (undated) DEVICE — CATH URTH BDX IC 22FR FL 3

## (undated) DEVICE — FLOSEAL HEMOSTATIC MATRIX, 5ML: Brand: FLOSEAL HEMOSTATIC MATRIX

## (undated) DEVICE — Device: Brand: VIRTUOSAPH PLUS WITH RADIAL INDICATION

## (undated) DEVICE — GAMMEX® PI HYBRID SIZE 7.5, STERILE POWDER-FREE SURGICAL GLOVE, POLYISOPRENE AND NEOPRENE BLEND: Brand: GAMMEX

## (undated) DEVICE — SUTURE MONOCRYL 3-0 Y936H

## (undated) DEVICE — 6 ML SYRINGE LUER-LOCK TIP: Brand: MONOJECT

## (undated) DEVICE — HEART DRAPE AND SUPPLY: Brand: MEDLINE INDUSTRIES, INC.

## (undated) DEVICE — SUTURE SILK 1-0 SA87G

## (undated) DEVICE — SUTURE PDS II 2-0 CT-1

## (undated) DEVICE — REM POLYHESIVE ADULT PATIENT RETURN ELECTRODE: Brand: VALLEYLAB

## (undated) DEVICE — SOL  .9 1000ML BAG

## (undated) DEVICE — MEDI-VAC NON-CONDUCTIVE SUCTION TUBING 6MM X 1.8M (6FT.) L: Brand: CARDINAL HEALTH

## (undated) DEVICE — SUTURE VICRYL 1-0 J977H

## (undated) DEVICE — 3 ML SYRINGE LUER-LOCK TIP: Brand: MONOJECT

## (undated) DEVICE — MINI-BLADE®: Brand: BEAVER®

## (undated) DEVICE — SUTURE PROLENE 5-0 8325H

## (undated) DEVICE — 12 ML SYRINGE LUER-LOCK TIP: Brand: MONOJECT

## (undated) DEVICE — 32 FR STRAIGHT – SOFT PVC CATHETER: Brand: PVC THORACIC CATHETERS

## (undated) DEVICE — SUTURE WIRE DOUBLE STERNOTOMY

## (undated) DEVICE — SUTURE SILK 4-0 SA63H

## (undated) DEVICE — SNARE 9MM 230CM 2.4MM EXACTO

## (undated) DEVICE — SOL  .9 3000ML

## (undated) DEVICE — TRAY CATH BDX 16FR 350ML FL

## (undated) DEVICE — FORCEP RADIAL JAW 4

## (undated) DEVICE — CONMED SCOPE SAVER BITE BLOCK, 20X27 MM: Brand: SCOPE SAVER

## (undated) DEVICE — CS5/5+ FASTPACK, 225ML 150U RES: Brand: HAEMONETICS CELL SAVER 5/5+ SYSTEMS

## (undated) DEVICE — CYSTO PACK: Brand: MEDLINE INDUSTRIES, INC.

## (undated) DEVICE — PUNCH AORT 4MM 8IN ROT BLT TIP

## (undated) DEVICE — PACK ASSRY CUSTOM TUBING

## (undated) DEVICE — CATH SECURING DEVICE STATLOCK

## (undated) DEVICE — EZ GLIDE AORTIC CANNULA: Brand: EDWARDS LIFESCIENCES EZ GLIDE AORTIC CANNULA

## (undated) DEVICE — SUTURE VICRYL 2-0 CT-1

## (undated) NOTE — LETTER
07/10/19        Yvan Pop      Dear Frankey Lesser records indicate that you have outstanding lab work and or testing that was ordered for you and has not yet been completed:  Orders Placed This Encounter

## (undated) NOTE — LETTER
[facility]  Administration of Anesthesia  1. I, Germain Velazquez, or _________________________________ acting on his behalf, (Patient) (Dependent/Representative) request to receive anesthesia for my pending procedure/operation/treatment. infections, high spinal block, spinal bleeding, seizure, cardiac arrest and death. 7. AWARENESS: I understand that it is possible (but unlikely) to have explicit memory of events from the operating room while under general anesthesia.   8. ELECTROCONVULSIV unconscious pt /Relationship    My signature below affirms that prior to the time of the procedure, I have explained to the patient and/or his/her guardian, the risks and benefits of undergoing anesthesia, as well as any reasonable alternatives.     _______

## (undated) NOTE — LETTER
[address] [place] [address]      Authorization for Surgical Operation and Procedure     Date:___________                                                                                                         Time:_______ and/or blood products. The following are some, but not all, of the potential risks that can occur: fever and allergic reactions, hemolytic reactions, transmission of diseases such as Hepatitis, AIDS and Cytomegalovirus (CMV) and fluid overload.   In the ev (or a person authorized to consent on my behalf). The surgeon or my attending physician will determine when the applicable recovery period ends for purposes of reinstating the DNAR order.   10. Patients having a sterilization procedure: I understand that if procedure/surgery, I have disclosed this and had a discussion with my patient.     _______________________________________________________________ _____________________________  Lilian Peck)

## (undated) NOTE — LETTER
12/29/2017          To Whom It May Concern:    Ac Cohen is currently under my medical care and may not return to work for 1 week as of today 12/29/2017. If you require additional information please contact our office.         Sincerely,    Gr

## (undated) NOTE — LETTER
To Whom It May Concern:    Shivani Dias has been under our care regarding ongoing medical issues. Because of this, he has been required to restrict her physical activities.     He may resume his usual activities, including work, on 06/14/2021 with t

## (undated) NOTE — IP AVS SNAPSHOT
Patient Demographics     Address  [address] [place] [address] Gavino Stevens [address] Phone  [phone] (Home)  [phone] (Mobile) *Preferred* E-mail Address  [email or web address]      Emergency Contact(s)     Name Relation Home Work [address] [facility] Sister YONIS Barragan         Combigan 0.2-0.5 % Soln  Generic drug: Brimonidine Tartrate-Timolol      Place 1 drop into both eyes 2 (two) times a day.           cyclobenzaprine 10 MG Tabs  Commonly known as: FLEXERIL  Next dose due: As needed      Take 10 (ANCEF) 1 g in sodium chloride 0.9% 100 mL IVPB-ADDV 09/02/21 2144 New Bag      [id number] Dorzolamide HCl (TRUSOPT) 2 % ophthalmic solution 1 drop 09/02/21 2145 Given      [id number] Dorzolamide HCl (TRUSOPT) 2 % ophthalmic solution 1 drop 09/03/21 0921 [address]     Order ID Medication Name Action Time Action Reason Comments    [id number] Insulin Aspart Pen (NOVOLOG) 100 UNIT/ML flexpen 1-7 Units 09/02/21 1807 Given            RIGHT UPPER ABDOMEN     Order ID Medication Name A No significant morphologic abnormalities are seen for the other leukocyte subsets or platelets.     Red blood cells show mild anisopoikilocytosis with scattered hypochromatic microcytes (may be seen in association with iron deficiency, some hemoglobinopat Lymphocyte Absolute Manual 0.20 1.00 - 4.00 x10(3) uL [facility] ([facility])   Monocyte Absolute Manual 0.04 0.10 - 1.00 x10(3) uL L [facility] ([facility])   Eosinophil Absolute Manual 0.00 0.00 - 0.70 x10(3) uL — [facility] ([facility])   Basophil Absolute Manual 0. 5.1 mmol/L — [facility])   Chloride 108 98 - 112 mmol/L — [facility] ([facility])   CO2 26.0 21.0 - 32.0 mmol/L — [facility] ([facility])   Anion Gap 6 0 - 18 mmol/L — [facility] ([facility])   BUN 19 7 - 18 mg/dL H [facility])   Creatinine 0.33 0.70 - 1.30 MRSA Screen By PCR Negative    Rapid SARS-CoV-2 by PCR [[id number]]  (Normal) Collected: 08/27/21 1648    Order Status: Completed Lab Status: Final result Updated: 08/27/21 4112    Specimen: Other from Nares      Rapid SARS-CoV-2 by PCR Not Detected Recently, his imaging studies showed widely metastatic prostate cancer. Failed androgen blockage and elevated PSA. He has history of coronary artery disease status post coronary artery bypass graft surgery in September 2020.   He had angiogram recently wi blood with bowel movement. No chest pain or abdominal pain. Other 12-point review of systems negative. PHYSICAL EXAMINATION:    GENERAL:  Pale in color, fatigued. Alert and oriented to time, place, and person. Mild to moderate distress.    VITAL S Wallace Palomino MD  d: 08/27/2021 17:22:28  t: 08/27/2021 18:27:10  [facility] [id number]  FB/  [FB.1]  Electronically signed by Alana Weems MD on 8/27/2021  7:29 PM   Attribution Key    FB. 1 - Alana Weems MD on 8/27/2021  6:50 PM Past Surgical history:     Past Medical History:   Diagnosis Date   • Ankle fracture, right 2008   • Atherosclerosis of coronary artery     Negative nuclear chemical stress test of the heart [place]) Oct 2014   • CAD (coronary artery disease) History:[NS.1] did not explore[NS.3]    Rastafarian/Cultural Information:  Rastafarian Affiliation: Mandaeism    Functional Status: h/o multiple falls, feeling more more    Substance History     Smoking status none  History of Substance abuse none    Allergies: injection 10 mL, 10 mL, Intravenous, PRN  •  calcium carbonate-vitamin D 250-125 MG-UNIT per tab TABS 2 tablet, 2 tablet, Oral, TID CC  •  aspirin EC tab 81 mg, 81 mg, Oral, Daily  •  brimonidine Tartrate (ALPHAGAN) 0.2 % ophthalmic solution 1 drop, 1 drop emphysema, which can be correlated clinically with physical examination. No radiographic evidence of osteomyelitis. 2. Chronic healed lateral malleolar fracture status post ORIF. Mild osteoarthritis involving the tibiotalar joint.  3. Pes planus with mode ask and answer questions appropriately.   Psychiatric: Mood: no agitation noted, no anxiety noted[NS.1].[NS.3]    Palliative Performance Scale:     Palliative Performance Scale   % Ambulation Activity Level Self-Care Intake Consciousness   100 Full Normal F aggressive measures done however if his clinical condition turns into long term care, he would want to stop these measures at that time. He does NOT want to set limits to his medical care.     We talked about his sister Hoang being the person to make th documentation which was scanned to EMR 09/2020.  - Living Will: detailed and scanned to EMR on 09/2020    3. Symptoms management. No new medications from our palliative care services    4. Psychosocial: Emotional support provided.         5. Disposition:[NS [id number]                                E: [id number] Study date: Aug 21 2021 3:00PM               Room: [facility] Accession: [id number] HT:(69in) WT:(195.4lb)                       BP: 109 / 54 ---------------------------------------------------------------- Study completion:  The patient tolerated the procedure well. There were no complications. Transthoracic echocardiography. M-mode, complete 2D, complete spectral Doppler, and color Doppler. Patient YOB: 1950. Patient is Katherine roberts. Gender: male. adequate. Systemic veins: Inferior vena cava: The vessel was dilated.  The respirophasic diameter changes were blunted (< 50%), consistent with elevated central venous pressure. ------------------------------------------------------------------- Measurement LVOT DP   Aorta                            Value        09/10/2020 Reference  Aortic root ID                   3.5   cm     3.5        <4.2  Aortic root ID, ED, MM           3.3   cm     ---------- ---------   Left atrium                      Value encounter.           Physical Therapy Notes (last 72 hours) (Notes from 8/31/2021  4:55 PM through 9/3/2021  4:55 PM)      Physical Therapy Note signed by Tatyana Anderson PTA at 9/1/2021  2:00 PM  Version 1 of 1    Author: Tatyana Anderson PTA Service: R bedside chair at end of session with all needs in reach. The patient's Approx Degree of Impairment: 72.57% has been calculated based on documentation in the [facility] '6 clicks' Inpatient Basic Mobility Short Form.   Research supports that patients with this le Knee extension     Position Sitting       Patient End of Session: Up in chair;Family present; All patient questions and concerns addressed;RN aware of session/findings;Call light within reach; Needs met    CURRENT GOALS     Goals to be met by: 9/11/21  Jazzy Ahmadi chronic systolic congestive heart failure ([facility]    Acute heart failure ([facility]    Palliative care by specialist    Chemotherapy adverse reaction, initial encounter    Chemotherapy management, encounter for      OCCUPATIONAL THERAPY ASSESSMENT     Pt seen up O2 SATURATIONS       ACTIVITIES OF DAILY LIVING ASSESSMENT  AM-PAC ‘6-Clicks’ Inpatient Daily Activity Short Form  How much help from another person does the patient currently need…  -   Putting on and taking off regular lower body clothing?: A Lot  -   B chair follow to ambulate 3 ft , decreased safety     Patient will complete item retrieval with SBA  Comment: progressing            Goals  on:   Frequency: 3x week[BA. 1]         Attribution Gerardo    BA. 1 - Wilbert Denise on 2021 10:08 AM consult; BID dressing changes to toe  - Cardiology consult  - Oncology consult; chemotherapy per Dr. Simón Eng  - Treat Right toe infection  - Fluid restriction; PO lasix; strict I&Os  - See additional Care Plan goals for specific interventions   Patient/Family

## (undated) NOTE — IP AVS SNAPSHOT
[facility]            (For Outpatient Use Only) Initial Admit Date: 8/27/2021   Inpt/Obs Admit Date: Inpt: 8/27/21 / Obs: N/A   Discharge Date:    Nelson Early:  [de-identified]   MRN: [de-identified]   CSN: [id number]   CEID: [id number]        [facility] Number:  Insurance Type:    Subscriber Name:  Subscriber :    Subscriber ID:  Pt Rel to Subscriber:    Hospital Account Financial Class: Medicare    September 3, 2021

## (undated) NOTE — LETTER
[address]  [facility] [address], [place], [place]  [address]  INFORMED CONSENT FOR TRANSFUSION OF BLOOD OR BLOOD PRODUCTS  My physician has informed me of the nature, purpose, benefits and risks of transfusion for blood and blood components that ______________________________________________  (Signature of Patient)                                                            (Responsible party in case of Minor,

## (undated) NOTE — ED AVS SNAPSHOT
[facility] Emergency Department    [address]     [place] [address]    Phone:  [phone]    Fax:  [phone]           Analia Hoskins   MRN: [id number]    Department:  [facility] Emergency Department   Date of Visit:  3 and Class Registration line at [phone] or find a doctor online by visiting [email or web address].    IF THERE IS ANY CHANGE OR WORSENING OF YOUR CONDITION, CALL YOUR PRIMARY CARE PHYSICIAN AT ONCE OR RETURN IMMEDIATELY TO [address] [place] [address].     If

## (undated) NOTE — LETTER
[address], [place], [place]  [address]  INFORMED CONSENT FOR TRANSFUSION OF BLOOD OR BLOOD PRODUCTS  My physician has informed me of the nature, purpose, benefits and risks of transfusion for blood and blood components that ______________________________________________  (Signature of Patient)                                                            (Responsible party in case of Minor,

## (undated) NOTE — ED AVS SNAPSHOT
[facility] Emergency Department    [address]     [place] [address]    Phone:  [phone]    Fax:  [phone]           Hossein Copeashkan   MRN: [id number]    Department:  [facility] Emergency Department   Date of Visit:  3 aspect of your visit today. In an effort to constantly improve our service to you, we would appreciate any positive or negative feedback related to the care you received in our emergency department. Please call our [address] at [phone].   Your Farhana contact you. Please make sure we have your correct phone number on file.       I certified that I have received a copy of the aftercare instructions; that these instructions have been explained to me; all questions pertaining to these instructions have bee If you've recently had a stay at the Hospital you can access your discharge instructions in [employer]. by going to Visits < Admission Summaries.  If you've been to the Emergency Department or your doctor's office, you can view your past visit information in My

## (undated) NOTE — LETTER
[address], [place]  Authorization for Invasive Procedures  1.  I hereby authorize Dr. Frye Knife my physician and whomever may be designated as the doctor's assistant, to perform the following operation and/or procedure: Cardiac cathet products.  Further, I understand that despite careful testing and screening of blood and blood products, I may still be subject to ill effects as a result of recieving a blood transfusion an/or blood producst. The following are some, but not all, of the pot OPERATION and/or OTHER PROCEDURE. 11. I acknowledge that my physician has explained sedation/analgesia administration to me including the risks and benefits.  I consent to the administration of sedation/analgesia as may be necessary or desirable in the

## (undated) NOTE — MR AVS SNAPSHOT
[address] [address]  [phone]               Thank you for choosing us for your health care visit with Brandin Rosa MD.  We are glad to serve you and happy to provide you with this summar PSA (Monitoring/Diagnostic) [E]    Complete by:  Apr 17, 2017 (Approximate)    Assoc Dx:  Encounter for annual health examination [Z00.00], [facility] ([facility] Lin Hernandez                 Follow-up Instructions     Return in about 6 months (around 10/17/2017) Cholesterol, covered every 5 yrs including Total, LDL and Trigs LDL CHOLESTEROL (mg/dL)   Date Value   03/03/2016 72     CHOLESTEROL, TOTAL (mg/dL)   Date Value   03/03/2016 189     TRIGLYCERIDES (mg/dL)   Date Value   03/03/2016 361*        EKG - covered Pneumococcal 13 (Prevnar)  Covered Once after 65 No orders found for this or any previous visit. Please get once after your 65th birthday    Pneumococcal 23 (Pneumovax)  Covered Once after 65 No orders found for this or any previous visit.  Please get once 124/66 mmHg 54 98 °F (36.7 °C) (Oral) 5' 8.5\" (1.74 m) 198 lb (89.812 kg) 29.66 kg/m2         Current Medications          This list is accurate as of: 4/17/17  8:42 AM.  Always use your most recent med list.                AmLODIPine Besylate 10 MG Tabs Call [phone] for help. [facility] is NOT to be used for urgent needs. For medical emergencies, dial 911.         Educational Information     Healthy Diet and Regular Exercise  [employer] for making healthy food choices  -

## (undated) NOTE — LETTER
[facility]  Administration of Anesthesia  1. I, Terri Jeffries, or _________________________________ acting on his behalf, (Patient) (Dependent/Representative) request to receive anesthesia for my pending procedure/operation/treatment. bleeding, seizure, cardiac arrest and death. 7. AWARENESS: I understand that it is possible (but unlikely) to have explicit memory of events from the operating room while under general anesthesia.   8. ELECTROCONVULSIVE THERAPY PATIENTS: This consent serve below affirms that prior to the time of the procedure, I have explained to the patient and/or his/her guardian, the risks and benefits of undergoing anesthesia, as well as any reasonable alternatives.     ___________________________________________________